# Patient Record
Sex: FEMALE | Race: OTHER | NOT HISPANIC OR LATINO | URBAN - METROPOLITAN AREA
[De-identification: names, ages, dates, MRNs, and addresses within clinical notes are randomized per-mention and may not be internally consistent; named-entity substitution may affect disease eponyms.]

---

## 2018-12-05 ENCOUNTER — INPATIENT (INPATIENT)
Facility: HOSPITAL | Age: 48
LOS: 1 days | Discharge: HOME | DRG: 313 | End: 2018-12-07
Attending: INTERNAL MEDICINE | Admitting: INTERNAL MEDICINE
Payer: COMMERCIAL

## 2018-12-05 VITALS
OXYGEN SATURATION: 98 % | DIASTOLIC BLOOD PRESSURE: 95 MMHG | HEART RATE: 100 BPM | RESPIRATION RATE: 18 BRPM | SYSTOLIC BLOOD PRESSURE: 174 MMHG | TEMPERATURE: 98 F

## 2018-12-05 DIAGNOSIS — Z29.9 ENCOUNTER FOR PROPHYLACTIC MEASURES, UNSPECIFIED: ICD-10-CM

## 2018-12-05 DIAGNOSIS — K35.80 UNSPECIFIED ACUTE APPENDICITIS: Chronic | ICD-10-CM

## 2018-12-05 DIAGNOSIS — E11.9 TYPE 2 DIABETES MELLITUS WITHOUT COMPLICATIONS: ICD-10-CM

## 2018-12-05 DIAGNOSIS — E78.5 HYPERLIPIDEMIA, UNSPECIFIED: ICD-10-CM

## 2018-12-05 DIAGNOSIS — M75.00 ADHESIVE CAPSULITIS OF UNSPECIFIED SHOULDER: ICD-10-CM

## 2018-12-05 DIAGNOSIS — R07.9 CHEST PAIN, UNSPECIFIED: ICD-10-CM

## 2018-12-05 DIAGNOSIS — I10 ESSENTIAL (PRIMARY) HYPERTENSION: ICD-10-CM

## 2018-12-05 LAB
ALBUMIN SERPL ELPH-MCNC: 3.6 G/DL — SIGNIFICANT CHANGE UP (ref 3.5–5)
ALP SERPL-CCNC: 126 U/L — HIGH (ref 40–120)
ALT FLD-CCNC: 63 U/L DA — HIGH (ref 10–60)
ANION GAP SERPL CALC-SCNC: 6 MMOL/L — SIGNIFICANT CHANGE UP (ref 5–17)
APTT BLD: 33.4 SEC — SIGNIFICANT CHANGE UP (ref 27.5–36.3)
AST SERPL-CCNC: 16 U/L — SIGNIFICANT CHANGE UP (ref 10–40)
BASOPHILS # BLD AUTO: 0.1 K/UL — SIGNIFICANT CHANGE UP (ref 0–0.2)
BASOPHILS NFR BLD AUTO: 1.4 % — SIGNIFICANT CHANGE UP (ref 0–2)
BILIRUB SERPL-MCNC: 0.3 MG/DL — SIGNIFICANT CHANGE UP (ref 0.2–1.2)
BUN SERPL-MCNC: 13 MG/DL — SIGNIFICANT CHANGE UP (ref 7–18)
CALCIUM SERPL-MCNC: 9.5 MG/DL — SIGNIFICANT CHANGE UP (ref 8.4–10.5)
CHLORIDE SERPL-SCNC: 104 MMOL/L — SIGNIFICANT CHANGE UP (ref 96–108)
CO2 SERPL-SCNC: 31 MMOL/L — SIGNIFICANT CHANGE UP (ref 22–31)
CREAT SERPL-MCNC: 0.62 MG/DL — SIGNIFICANT CHANGE UP (ref 0.5–1.3)
D DIMER BLD IA.RAPID-MCNC: 217 NG/ML DDU — SIGNIFICANT CHANGE UP
EOSINOPHIL # BLD AUTO: 0.1 K/UL — SIGNIFICANT CHANGE UP (ref 0–0.5)
EOSINOPHIL NFR BLD AUTO: 2.4 % — SIGNIFICANT CHANGE UP (ref 0–6)
GLUCOSE SERPL-MCNC: 158 MG/DL — HIGH (ref 70–99)
HCG SERPL-ACNC: 2 MIU/ML — SIGNIFICANT CHANGE UP
HCT VFR BLD CALC: 42.6 % — SIGNIFICANT CHANGE UP (ref 34.5–45)
HGB BLD-MCNC: 14.1 G/DL — SIGNIFICANT CHANGE UP (ref 11.5–15.5)
INR BLD: 0.98 RATIO — SIGNIFICANT CHANGE UP (ref 0.88–1.16)
LYMPHOCYTES # BLD AUTO: 2.5 K/UL — SIGNIFICANT CHANGE UP (ref 1–3.3)
LYMPHOCYTES # BLD AUTO: 39.8 % — SIGNIFICANT CHANGE UP (ref 13–44)
MAGNESIUM SERPL-MCNC: 2.1 MG/DL — SIGNIFICANT CHANGE UP (ref 1.6–2.6)
MCHC RBC-ENTMCNC: 28.4 PG — SIGNIFICANT CHANGE UP (ref 27–34)
MCHC RBC-ENTMCNC: 33 GM/DL — SIGNIFICANT CHANGE UP (ref 32–36)
MCV RBC AUTO: 86.1 FL — SIGNIFICANT CHANGE UP (ref 80–100)
MONOCYTES # BLD AUTO: 0.4 K/UL — SIGNIFICANT CHANGE UP (ref 0–0.9)
MONOCYTES NFR BLD AUTO: 6.4 % — SIGNIFICANT CHANGE UP (ref 2–14)
NEUTROPHILS # BLD AUTO: 3.1 K/UL — SIGNIFICANT CHANGE UP (ref 1.8–7.4)
NEUTROPHILS NFR BLD AUTO: 50 % — SIGNIFICANT CHANGE UP (ref 43–77)
NT-PROBNP SERPL-SCNC: 23 PG/ML — SIGNIFICANT CHANGE UP (ref 0–125)
PHOSPHATE SERPL-MCNC: 3.6 MG/DL — SIGNIFICANT CHANGE UP (ref 2.5–4.5)
PLATELET # BLD AUTO: 324 K/UL — SIGNIFICANT CHANGE UP (ref 150–400)
POTASSIUM SERPL-MCNC: 4.5 MMOL/L — SIGNIFICANT CHANGE UP (ref 3.5–5.3)
POTASSIUM SERPL-SCNC: 4.5 MMOL/L — SIGNIFICANT CHANGE UP (ref 3.5–5.3)
PROT SERPL-MCNC: 8.2 G/DL — SIGNIFICANT CHANGE UP (ref 6–8.3)
PROTHROM AB SERPL-ACNC: 10.9 SEC — SIGNIFICANT CHANGE UP (ref 10–12.9)
RBC # BLD: 4.95 M/UL — SIGNIFICANT CHANGE UP (ref 3.8–5.2)
RBC # FLD: 13.1 % — SIGNIFICANT CHANGE UP (ref 10.3–14.5)
SODIUM SERPL-SCNC: 141 MMOL/L — SIGNIFICANT CHANGE UP (ref 135–145)
TROPONIN I SERPL-MCNC: <0.015 NG/ML — SIGNIFICANT CHANGE UP (ref 0–0.04)
WBC # BLD: 6.2 K/UL — SIGNIFICANT CHANGE UP (ref 3.8–10.5)
WBC # FLD AUTO: 6.2 K/UL — SIGNIFICANT CHANGE UP (ref 3.8–10.5)

## 2018-12-05 PROCEDURE — 93010 ELECTROCARDIOGRAM REPORT: CPT

## 2018-12-05 PROCEDURE — 99285 EMERGENCY DEPT VISIT HI MDM: CPT

## 2018-12-05 PROCEDURE — 71046 X-RAY EXAM CHEST 2 VIEWS: CPT | Mod: 26

## 2018-12-05 RX ORDER — METOPROLOL TARTRATE 50 MG
25 TABLET ORAL DAILY
Qty: 0 | Refills: 0 | Status: DISCONTINUED | OUTPATIENT
Start: 2018-12-05 | End: 2018-12-05

## 2018-12-05 RX ORDER — DEXTROSE 50 % IN WATER 50 %
12.5 SYRINGE (ML) INTRAVENOUS ONCE
Qty: 0 | Refills: 0 | Status: DISCONTINUED | OUTPATIENT
Start: 2018-12-05 | End: 2018-12-07

## 2018-12-05 RX ORDER — INSULIN DEGLUDEC 100 U/ML
55 INJECTION, SOLUTION SUBCUTANEOUS
Qty: 0 | Refills: 0 | COMMUNITY

## 2018-12-05 RX ORDER — ASPIRIN/CALCIUM CARB/MAGNESIUM 324 MG
81 TABLET ORAL DAILY
Qty: 0 | Refills: 0 | Status: DISCONTINUED | OUTPATIENT
Start: 2018-12-05 | End: 2018-12-07

## 2018-12-05 RX ORDER — DEXTROSE 50 % IN WATER 50 %
25 SYRINGE (ML) INTRAVENOUS ONCE
Qty: 0 | Refills: 0 | Status: DISCONTINUED | OUTPATIENT
Start: 2018-12-05 | End: 2018-12-07

## 2018-12-05 RX ORDER — INSULIN GLARGINE 100 [IU]/ML
20 INJECTION, SOLUTION SUBCUTANEOUS AT BEDTIME
Qty: 0 | Refills: 0 | Status: DISCONTINUED | OUTPATIENT
Start: 2018-12-05 | End: 2018-12-07

## 2018-12-05 RX ORDER — GLUCAGON INJECTION, SOLUTION 0.5 MG/.1ML
1 INJECTION, SOLUTION SUBCUTANEOUS ONCE
Qty: 0 | Refills: 0 | Status: DISCONTINUED | OUTPATIENT
Start: 2018-12-05 | End: 2018-12-07

## 2018-12-05 RX ORDER — ACETAMINOPHEN 500 MG
975 TABLET ORAL ONCE
Qty: 0 | Refills: 0 | Status: COMPLETED | OUTPATIENT
Start: 2018-12-05 | End: 2018-12-05

## 2018-12-05 RX ORDER — SODIUM CHLORIDE 9 MG/ML
1000 INJECTION, SOLUTION INTRAVENOUS
Qty: 0 | Refills: 0 | Status: DISCONTINUED | OUTPATIENT
Start: 2018-12-05 | End: 2018-12-07

## 2018-12-05 RX ORDER — INSULIN LISPRO 100/ML
VIAL (ML) SUBCUTANEOUS
Qty: 0 | Refills: 0 | Status: DISCONTINUED | OUTPATIENT
Start: 2018-12-05 | End: 2018-12-07

## 2018-12-05 RX ORDER — LISINOPRIL 2.5 MG/1
10 TABLET ORAL DAILY
Qty: 0 | Refills: 0 | Status: DISCONTINUED | OUTPATIENT
Start: 2018-12-05 | End: 2018-12-07

## 2018-12-05 RX ORDER — DEXTROSE 50 % IN WATER 50 %
15 SYRINGE (ML) INTRAVENOUS ONCE
Qty: 0 | Refills: 0 | Status: DISCONTINUED | OUTPATIENT
Start: 2018-12-05 | End: 2018-12-07

## 2018-12-05 RX ORDER — METOPROLOL TARTRATE 50 MG
25 TABLET ORAL DAILY
Qty: 0 | Refills: 0 | Status: DISCONTINUED | OUTPATIENT
Start: 2018-12-05 | End: 2018-12-07

## 2018-12-05 RX ORDER — ATORVASTATIN CALCIUM 80 MG/1
40 TABLET, FILM COATED ORAL AT BEDTIME
Qty: 0 | Refills: 0 | Status: DISCONTINUED | OUTPATIENT
Start: 2018-12-05 | End: 2018-12-07

## 2018-12-05 RX ADMIN — Medication 975 MILLIGRAM(S): at 23:14

## 2018-12-05 RX ADMIN — Medication 975 MILLIGRAM(S): at 17:25

## 2018-12-05 RX ADMIN — ATORVASTATIN CALCIUM 40 MILLIGRAM(S): 80 TABLET, FILM COATED ORAL at 23:24

## 2018-12-05 NOTE — ED PROVIDER NOTE - NS ED ROS FT
Constitutional: - Fever, - Chills, - unexplained weight loss  Eyes: - Discharge, - Irritation, - Redness, - Visual changes, - Light sensitivity  EARS: - Ear Pain, - hearing loss  NOSE: - Congestion, - epistaxis  MOUTH/THROAT: - Vocal Changes, - Drooling, - Sore throat  NECK: - Lumps, - Stiffness, - Pain  CV: - Palpitations, + Chest Pain, - Edema   RESP:  - Cough, + Shortness of Breath, - Dyspnea on Exertion, - Wheezing  GI: - Diarrhea, - Constipation, - Bloody stools, - Nausea, - Vomiting, - Abdominal Pain  : - Dysuria, -Frequency, - Hematuria  MSK: - Myalgias, - focal weakness, - Injuries  SKIN: - Color change, - Rash  NEURO: - Change in behavior, - Dec. Alertness, - Headache, - Change in speech, - Seizure-like activity

## 2018-12-05 NOTE — ED PROVIDER NOTE - PROGRESS NOTE DETAILS
pt continues to feel well in ED, no emergent findings on initial workup, admitted to dr. gallego for ACS r/o given risk factors and concerning story

## 2018-12-05 NOTE — H&P ADULT - NEGATIVE OPHTHALMOLOGIC SYMPTOMS
no diplopia/no lacrimation L/no lacrimation R/no blurred vision L/no blurred vision R/no photophobia

## 2018-12-05 NOTE — ED PROVIDER NOTE - CARE PLAN
Principal Discharge DX:	Chest pain  Secondary Diagnosis:	Diabetes  Secondary Diagnosis:	Hyperlipidemia  Secondary Diagnosis:	Hypertension

## 2018-12-05 NOTE — H&P ADULT - PROBLEM SELECTOR PLAN 6
IMPROVE VTE Individual Risk Assessment   RISK                                                          Points  [  ] Previous VTE                                                3  [  ] Thrombophilia                                             2  [  ] Lower limb paralysis                                    2        (unable to hold up >15 seconds)    [  ] Current Cancer                                             2         (within 6 months)  [x  ] Immobilization > 24 hrs                              1  [  ] ICU/CCU stay > 24 hours                            1  [  ] Age > 60                                                    1  IMPROVE VTE Score _________   No need of DVT prophylaxis

## 2018-12-05 NOTE — H&P ADULT - PROBLEM SELECTOR PLAN 1
pt presented with left sided chest pain.  EKG was normal  Troponin T1 normal.  F/u T2 and T3  Aspirin, statin and betablocker started   F/u Echocardiography and stress test at Am.  Cardiology:  pt presented with left sided chest pain.  EKG showed normal sinus rhythm  Troponin T1 normal.  F/u T2 and T3  Aspirin, statin and betablocker started   F/u Echocardiography and stress test at Am.  Cardiology:  pt presented with left sided chest pain.  JOSHUA score:2, , Heart score:2  EKG showed normal sinus rhythm  Troponin T1 normal.  F/u T2 and T3  Aspirin, statin and betablocker started   F/u Echocardiography and stress test at Am.  Cardiology:

## 2018-12-05 NOTE — ED PROVIDER NOTE - OBJECTIVE STATEMENT
48F h/o htn hld dm p/w few episodes CP over past few days. Pt says ~4d ago she was running to bus and had sudden onset of severe sharp left chest pain a/w SOB which she had never had before. Recently went to PCP and had bloodwork that showed HbA1C of 13, cholesterol 350s, triglycerides 190. Also started meloxicam for left shoulder pain and after meloxicam felt short of breath and had facial swelling which has since resolved. Last stress years ago. Pt denies known cardiac hx, social hx, EVELYN/pain, recent travel/trauma/surg, dvt/pe/hypercoag hx in self or family, cancer hx, exogenous estrogen tx, trauma, dizziness, syncope, asthma/smoking hx

## 2018-12-05 NOTE — H&P ADULT - PROBLEM SELECTOR PLAN 5
IMPROVE VTE Individual Risk Assessment   RISK                                                          Points  [  ] Previous VTE                                                3  [  ] Thrombophilia                                             2  [  ] Lower limb paralysis                                    2        (unable to hold up >15 seconds)    [  ] Current Cancer                                             2         (within 6 months)  [x  ] Immobilization > 24 hrs                              1  [  ] ICU/CCU stay > 24 hours                            1  [  ] Age > 60                                                    1  IMPROVE VTE Score _________   No need of DVT prophylaxis Pt has left shoulder pain with decreased range of motion noted on physical examination.   Pain medication as needed.  Primary team please call orthopedics at am Pt has left shoulder pain with decreased range of motion noted on physical examination. As the pt has diabetes, frozen shoulder is most likely differential diagnosis.  Pain medication as needed.  Primary team please call orthopedics at am

## 2018-12-05 NOTE — H&P ADULT - NSHPSOCIALHISTORY_GEN_ALL_CORE
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 She has never smoked. Drinks alcohol obsessionally.

## 2018-12-05 NOTE — ED ADULT NURSE NOTE - OBJECTIVE STATEMENT
pt a&ox3, here with c/o CP. pt states she started meloxicam last week and has been having CP since last thursday. states left sided CP and left shoulder pain. pt attributes shoulder pain to carrying heavy bag. denies n/v, fever, cough, sweats, jaw pain, back pain.

## 2018-12-05 NOTE — H&P ADULT - ASSESSMENT
49 y/o female with PMH of HTN, DM, HLD presented with chest pain and back pain since 1 week. According to  the pt, she was having mild chest pain  last week of about 6/10 intensity initially but increased up to 9/10 when she ran for bus, relieved by rest. Mild chest pain persisted since then and she called her PCP yesterday who recommended to come to ED. S    12-05    141  |  104  |  13  ----------------------------<  158<H>  4.5   |  31  |  0.62    Ca    9.5      05 Dec 2018 17:24  Phos  3.6     12-05  Mg     2.1     12-05    TPro  8.2  /  Alb  3.6  /  TBili  0.3  /  DBili  x   /  AST  16  /  ALT  63<H>  /  AlkPhos  126<H>  12-05                          14.1   6.2   )-----------( 324      ( 05 Dec 2018 17:24 )             42.6     ED course:  Vitals: B.P:  147/95, H.R:100, T: 97.5F, RR: 18, SPO2: 98, EKG normal sinus rhythm, Chest X-ray was normal,   Troponoin T1: Normal

## 2018-12-05 NOTE — ED ADULT NURSE NOTE - ED STAT RN HANDOFF DETAILS
report given to REJI Mitchell in G1 in stable condition for continuation of care. pt a&ox3, admitted for CP and HTN.

## 2018-12-05 NOTE — H&P ADULT - PROBLEM SELECTOR PLAN 4
Pt has h/o HTN and takes Lisinopril 10mg OD  Continue home medicaiton  Monitor Blood pressure Pt has h/o HTN and takes Lisinopril 10mg OD  Continue home medication  Monitor Blood pressure

## 2018-12-05 NOTE — H&P ADULT - HISTORY OF PRESENT ILLNESS
47 y/o female with PMH of HTN, DM, HLD presented with chest pain and back pain since 1 week. According to  the pt, she was having mild chest pain  last week of about 6/10 intensity initially but increased up to 9/10 when she ran for bus, relieved by rest. Mild chest pain persisted since then and she called her PCP yesterday who recommended to come to ED. She also complained of sharp and shooting back pain since last weekradiating from lumbar vertebra to cervical spine lasting few seconds. Back pain is on and off since then. She mentioned that her friends and family noticed her face swollen. She also endorsed that she has been having  left shoulder pain since 2 months as she has been carrying heavy bags on her left hand. 47 y/o female with PMH of HTN, DM, HLD presented with chest pain and back pain since 1 week. According to  the pt, she was having mild chest pain  last week of about 6/10 intensity initially but increased up to 9/10 when she ran for bus, relieved by rest. Mild chest pain persisted since then and she called her PCP yesterday who recommended to come to ED. She also complained of sharp and shooting back pain since last weekradiating from lumbar vertebra to cervical spine lasting few seconds. Back pain is on and off since then. She mentioned that her friends and family noticed her face swollen. She also endorsed that she has been having  left shoulder pain since 2 months as she has been carrying heavy bags on her left hand. She mentioned that she has mild shortness of breath while walking and mild cough. orthopnea, PND and leg swelling are absent.  She deniees nausea, vomiting, blurring of vision, dizziness.    PMH: Diabetes, HTN, JLD  Past Surgical HX: Appendicectomy for appendicitis.  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

## 2018-12-05 NOTE — H&P ADULT - PROBLEM SELECTOR PLAN 2
Pt has a history of diabetes.  Continue sliding scale insulin Pt has a history of diabetes.  She takes Tresiba flex touch 55 units daily along with oral medications Jentadueto and Jardiance.   Continue sliding scale insulin.  Lantus 25 units at bedtime started

## 2018-12-05 NOTE — ED PROVIDER NOTE - PHYSICAL EXAMINATION
PE:   GEN: Awake, alert, oriented, interactive, NAD, well appearing.   HEAD: Atraumatic, normocephalic  EYES: Sclera white, conjunctiva pink, PERRLA  CARDIAC: borderline tachycardia, regular rhythm, S1,S2, no murmur/rub/gallop. Strong central and peripheral pulses, Brisk cap refill, no evident pedal edema.   RESP: Normal respiratory rate and effort, no resp distress, lungs cta b/l without accessory muscle use, wheeze, rhonchi, or rales.   ABD: soft, non-tender, nondistended  NEURO: AOx3, CN II-XII grossly intact without focal deficits   MSK: Moving all extremities spontaneously, no apparent deformities  SKIN: warm, dry

## 2018-12-05 NOTE — ED PROVIDER NOTE - MEDICAL DECISION MAKING DETAILS
markedly elevated hba1c and lipids, story concerning for ACS, no current active CP. less concerning for PE, but given  r/o with dimer. no ASA given recently possible anaphylaxis plan: ekg monitor cbc cmp trop dimer bnp coags cxr reassess admit for ACS r/o given high heart score.

## 2018-12-05 NOTE — H&P ADULT - NEUROLOGICAL DETAILS
sensation intact/superficial reflexes intact/alert and oriented x 3/deep reflexes intact/cranial nerves intact/normal strength

## 2018-12-06 DIAGNOSIS — R06.02 SHORTNESS OF BREATH: ICD-10-CM

## 2018-12-06 LAB
24R-OH-CALCIDIOL SERPL-MCNC: 15.2 NG/ML — LOW (ref 30–80)
ALBUMIN SERPL ELPH-MCNC: 3.2 G/DL — LOW (ref 3.5–5)
ALP SERPL-CCNC: 97 U/L — SIGNIFICANT CHANGE UP (ref 40–120)
ALT FLD-CCNC: 50 U/L DA — SIGNIFICANT CHANGE UP (ref 10–60)
ANION GAP SERPL CALC-SCNC: 7 MMOL/L — SIGNIFICANT CHANGE UP (ref 5–17)
AST SERPL-CCNC: 17 U/L — SIGNIFICANT CHANGE UP (ref 10–40)
BASOPHILS # BLD AUTO: 0.1 K/UL — SIGNIFICANT CHANGE UP (ref 0–0.2)
BASOPHILS NFR BLD AUTO: 0.8 % — SIGNIFICANT CHANGE UP (ref 0–2)
BILIRUB SERPL-MCNC: 0.4 MG/DL — SIGNIFICANT CHANGE UP (ref 0.2–1.2)
BUN SERPL-MCNC: 13 MG/DL — SIGNIFICANT CHANGE UP (ref 7–18)
CALCIUM SERPL-MCNC: 8.7 MG/DL — SIGNIFICANT CHANGE UP (ref 8.4–10.5)
CHLORIDE SERPL-SCNC: 105 MMOL/L — SIGNIFICANT CHANGE UP (ref 96–108)
CHOLEST SERPL-MCNC: 212 MG/DL — HIGH (ref 10–199)
CK MB BLD-MCNC: <1.2 % — SIGNIFICANT CHANGE UP (ref 0–3.5)
CK MB BLD-MCNC: <1.3 % — SIGNIFICANT CHANGE UP (ref 0–3.5)
CK MB CFR SERPL CALC: <1 NG/ML — SIGNIFICANT CHANGE UP (ref 0–3.6)
CK MB CFR SERPL CALC: <1 NG/ML — SIGNIFICANT CHANGE UP (ref 0–3.6)
CK SERPL-CCNC: 76 U/L — SIGNIFICANT CHANGE UP (ref 21–215)
CK SERPL-CCNC: 81 U/L — SIGNIFICANT CHANGE UP (ref 21–215)
CO2 SERPL-SCNC: 29 MMOL/L — SIGNIFICANT CHANGE UP (ref 22–31)
CREAT SERPL-MCNC: 0.44 MG/DL — LOW (ref 0.5–1.3)
EOSINOPHIL # BLD AUTO: 0.2 K/UL — SIGNIFICANT CHANGE UP (ref 0–0.5)
EOSINOPHIL NFR BLD AUTO: 2.2 % — SIGNIFICANT CHANGE UP (ref 0–6)
FOLATE SERPL-MCNC: >20 NG/ML — SIGNIFICANT CHANGE UP
GLUCOSE BLDC GLUCOMTR-MCNC: 122 MG/DL — HIGH (ref 70–99)
GLUCOSE BLDC GLUCOMTR-MCNC: 182 MG/DL — HIGH (ref 70–99)
GLUCOSE BLDC GLUCOMTR-MCNC: 204 MG/DL — HIGH (ref 70–99)
GLUCOSE SERPL-MCNC: 89 MG/DL — SIGNIFICANT CHANGE UP (ref 70–99)
HBA1C BLD-MCNC: 10.9 % — HIGH (ref 4–5.6)
HCT VFR BLD CALC: 40.7 % — SIGNIFICANT CHANGE UP (ref 34.5–45)
HDLC SERPL-MCNC: 49 MG/DL — LOW
HGB BLD-MCNC: 12.8 G/DL — SIGNIFICANT CHANGE UP (ref 11.5–15.5)
INR BLD: 1.06 RATIO — SIGNIFICANT CHANGE UP (ref 0.88–1.16)
LIPID PNL WITH DIRECT LDL SERPL: 138 MG/DL — SIGNIFICANT CHANGE UP
LYMPHOCYTES # BLD AUTO: 2.3 K/UL — SIGNIFICANT CHANGE UP (ref 1–3.3)
LYMPHOCYTES # BLD AUTO: 33.4 % — SIGNIFICANT CHANGE UP (ref 13–44)
MAGNESIUM SERPL-MCNC: 2 MG/DL — SIGNIFICANT CHANGE UP (ref 1.6–2.6)
MCHC RBC-ENTMCNC: 28.3 PG — SIGNIFICANT CHANGE UP (ref 27–34)
MCHC RBC-ENTMCNC: 31.4 GM/DL — LOW (ref 32–36)
MCV RBC AUTO: 90.2 FL — SIGNIFICANT CHANGE UP (ref 80–100)
MONOCYTES # BLD AUTO: 0.5 K/UL — SIGNIFICANT CHANGE UP (ref 0–0.9)
MONOCYTES NFR BLD AUTO: 6.5 % — SIGNIFICANT CHANGE UP (ref 2–14)
NEUTROPHILS # BLD AUTO: 4 K/UL — SIGNIFICANT CHANGE UP (ref 1.8–7.4)
NEUTROPHILS NFR BLD AUTO: 57 % — SIGNIFICANT CHANGE UP (ref 43–77)
PHOSPHATE SERPL-MCNC: 4 MG/DL — SIGNIFICANT CHANGE UP (ref 2.5–4.5)
PLATELET # BLD AUTO: 294 K/UL — SIGNIFICANT CHANGE UP (ref 150–400)
POTASSIUM SERPL-MCNC: 3.7 MMOL/L — SIGNIFICANT CHANGE UP (ref 3.5–5.3)
POTASSIUM SERPL-SCNC: 3.7 MMOL/L — SIGNIFICANT CHANGE UP (ref 3.5–5.3)
PROT SERPL-MCNC: 7 G/DL — SIGNIFICANT CHANGE UP (ref 6–8.3)
PROTHROM AB SERPL-ACNC: 11.8 SEC — SIGNIFICANT CHANGE UP (ref 10–12.9)
RBC # BLD: 4.52 M/UL — SIGNIFICANT CHANGE UP (ref 3.8–5.2)
RBC # FLD: 13.3 % — SIGNIFICANT CHANGE UP (ref 10.3–14.5)
SODIUM SERPL-SCNC: 141 MMOL/L — SIGNIFICANT CHANGE UP (ref 135–145)
TOTAL CHOLESTEROL/HDL RATIO MEASUREMENT: 4.3 RATIO — SIGNIFICANT CHANGE UP (ref 3.3–7.1)
TRIGL SERPL-MCNC: 127 MG/DL — SIGNIFICANT CHANGE UP (ref 10–149)
TROPONIN I SERPL-MCNC: <0.015 NG/ML — SIGNIFICANT CHANGE UP (ref 0–0.04)
TROPONIN I SERPL-MCNC: <0.015 NG/ML — SIGNIFICANT CHANGE UP (ref 0–0.04)
TSH SERPL-MCNC: 1.5 UU/ML — SIGNIFICANT CHANGE UP (ref 0.34–4.82)
TSH SERPL-MCNC: 1.58 UU/ML — SIGNIFICANT CHANGE UP (ref 0.34–4.82)
VIT B12 SERPL-MCNC: 520 PG/ML — SIGNIFICANT CHANGE UP (ref 232–1245)
WBC # BLD: 6.9 K/UL — SIGNIFICANT CHANGE UP (ref 3.8–10.5)
WBC # FLD AUTO: 6.9 K/UL — SIGNIFICANT CHANGE UP (ref 3.8–10.5)

## 2018-12-06 RX ORDER — ERGOCALCIFEROL 1.25 MG/1
50000 CAPSULE ORAL
Qty: 0 | Refills: 0 | Status: DISCONTINUED | OUTPATIENT
Start: 2018-12-06 | End: 2018-12-07

## 2018-12-06 RX ORDER — ENOXAPARIN SODIUM 100 MG/ML
40 INJECTION SUBCUTANEOUS DAILY
Qty: 0 | Refills: 0 | Status: DISCONTINUED | OUTPATIENT
Start: 2018-12-06 | End: 2018-12-07

## 2018-12-06 RX ADMIN — INSULIN GLARGINE 20 UNIT(S): 100 INJECTION, SOLUTION SUBCUTANEOUS at 21:54

## 2018-12-06 RX ADMIN — Medication 1: at 12:25

## 2018-12-06 RX ADMIN — Medication 25 MILLIGRAM(S): at 05:44

## 2018-12-06 RX ADMIN — ATORVASTATIN CALCIUM 40 MILLIGRAM(S): 80 TABLET, FILM COATED ORAL at 21:52

## 2018-12-06 RX ADMIN — Medication 81 MILLIGRAM(S): at 12:28

## 2018-12-06 RX ADMIN — LISINOPRIL 10 MILLIGRAM(S): 2.5 TABLET ORAL at 05:44

## 2018-12-06 NOTE — PROGRESS NOTE ADULT - SUBJECTIVE AND OBJECTIVE BOX
PGY1 Note discussed with supervising resident and primary attending.    Patient is a 48y old  Female who presents with a chief complaint of Chest pain (06 Dec 2018 10:26)      INTERVAL HPI/OVERNIGHT EVENTS:    Ms. Delgado is seen at the bedside. No new complaints.    MEDICATIONS  (STANDING):  aspirin  chewable 81 milliGRAM(s) Oral daily  atorvastatin 40 milliGRAM(s) Oral at bedtime  dextrose 5%. 1000 milliLiter(s) (50 mL/Hr) IV Continuous <Continuous>  dextrose 50% Injectable 12.5 Gram(s) IV Push once  dextrose 50% Injectable 25 Gram(s) IV Push once  dextrose 50% Injectable 25 Gram(s) IV Push once  insulin glargine Injectable (LANTUS) 20 Unit(s) SubCutaneous at bedtime  insulin lispro (HumaLOG) corrective regimen sliding scale   SubCutaneous three times a day before meals  lisinopril 10 milliGRAM(s) Oral daily  metoprolol succinate ER 25 milliGRAM(s) Oral daily    MEDICATIONS  (PRN):  dextrose 40% Gel 15 Gram(s) Oral once PRN Blood Glucose LESS THAN 70 milliGRAM(s)/deciliter  glucagon  Injectable 1 milliGRAM(s) IntraMuscular once PRN Glucose LESS THAN 70 milligrams/deciliter      Allergies    NSAIDs (Short breath)  peppers (Rash)    Intolerances        REVIEW OF SYSTEMS:  CONSTITUTIONAL: No fever, weight loss, or fatigue  RESPIRATORY: No cough, wheezing, chills or hemoptysis; No shortness of breath  CARDIOVASCULAR: No chest pain, palpitations, dizziness, or leg swelling  GASTROINTESTINAL: No abdominal or epigastric pain. No nausea, vomiting, or hematemesis; No diarrhea or constipation. No melena or hematochezia.  NEUROLOGICAL: No headaches, memory loss, loss of strength, numbness, or tremors  SKIN: No itching, burning, rashes, or lesions     Vital Signs Last 24 Hrs  T(C): 36.7 (06 Dec 2018 11:07), Max: 36.9 (06 Dec 2018 02:47)  T(F): 98 (06 Dec 2018 11:07), Max: 98.5 (06 Dec 2018 05:09)  HR: 86 (06 Dec 2018 11:07) (77 - 100)  BP: 135/55 (06 Dec 2018 11:07) (133/48 - 174/95)  BP(mean): --  RR: 18 (06 Dec 2018 11:07) (16 - 18)  SpO2: 99% (06 Dec 2018 11:07) (97% - 99%)    PHYSICAL EXAM:  GENERAL: NAD, well-groomed, well-developed  HEAD:  Atraumatic, Normocephalic  EYES: EOMI, PERRLA, conjunctiva and sclera clear  NECK: Supple, No JVD, Normal thyroid  CHEST/LUNG: Clear to percussion bilaterally; No rales, rhonchi, wheezing, or rubs  HEART: Regular rate and rhythm; No murmurs, rubs, or gallops  ABDOMEN: Soft, Nontender, Nondistended; Bowel sounds present  NERVOUS SYSTEM:  Alert & Oriented X3, Good concentration; Motor Strength 5/5 B/L   EXTREMITIES:  2+ Peripheral Pulses, No clubbing, cyanosis, or edema    LABS:                        12.8   6.9   )-----------( 294      ( 06 Dec 2018 06:20 )             40.7     12-06    141  |  105  |  13  ----------------------------<  89  3.7   |  29  |  0.44<L>    Ca    8.7      06 Dec 2018 06:20  Phos  4.0     12-06  Mg     2.0     12-06    TPro  7.0  /  Alb  3.2<L>  /  TBili  0.4  /  DBili  x   /  AST  17  /  ALT  50  /  AlkPhos  97  12-06    PT/INR - ( 06 Dec 2018 06:20 )   PT: 11.8 sec;   INR: 1.06 ratio         PTT - ( 05 Dec 2018 17:24 )  PTT:33.4 sec    CAPILLARY BLOOD GLUCOSE      POCT Blood Glucose.: 182 mg/dL (06 Dec 2018 11:59)      RADIOLOGY & ADDITIONAL TESTS:    Imaging Personally Reviewed: [ X ] YES  [ ] NO    Consultant(s) Notes Reviewed:  [X ] YES  [ ] NO

## 2018-12-06 NOTE — PROGRESS NOTE ADULT - ASSESSMENT
49 y/o female with PMH of HTN, DM, HLD presented with chest pain and back pain since 1 week. According to  the pt, she was having mild chest pain  last week of about 6/10 intensity initially but increased up to 9/10 when she ran for bus, relieved by rest. Mild chest pain persisted since then and she called her PCP yesterday who recommended to come to ED. She also complained of sharp and shooting back pain since last weekradiating from lumbar vertebra to cervical spine lasting few seconds. Back pain is on and off since then. She mentioned that her friends and family noticed her face swollen. She also endorsed that she has been having  left shoulder pain since 2 months as she has been carrying heavy bags on her left hand. She mentioned that she has mild shortness of breath while walking and mild cough. orthopnea, PND and leg swelling are absent.  She deniees nausea, vomiting, blurring of vision, dizziness.      NST negative. Echo pending. Will d/c if echo normal.

## 2018-12-06 NOTE — CONSULT NOTE ADULT - SUBJECTIVE AND OBJECTIVE BOX
CHIEF COMPLAINT:Patient is a 48y old  Female who presents with a chief complaint of Chest pain.      HPI:  49 y/o female with PMH of HTN, DM, HLD presented with chest pain and back pain since 1 week. According to  the pt, she was having mild chest pain  last week of about 6/10 intensity initially but increased up to 9/10 when she ran for bus, relieved by rest. Mild chest pain persisted since then and she called her PCP yesterday who recommended to come to ED. She also complained of sharp and shooting back pain since last weekradiating from lumbar vertebra to cervical spine lasting few seconds. Back pain is on and off since then. She mentioned that her friends and family noticed her face swollen. She also endorsed that she has been having  left shoulder pain since 2 months as she has been carrying heavy bags on her left hand. She mentioned that she has mild shortness of breath while walking and mild cough. orthopnea, PND and leg swelling are absent.  She deniees nausea, vomiting, blurring of vision, dizziness.      PAST MEDICAL & SURGICAL HISTORY:  Diabetes  Hyperlipidemia  Hypertension  Acute appendicitis      MEDICATIONS  (STANDING):  aspirin  chewable 81 milliGRAM(s) Oral daily  atorvastatin 40 milliGRAM(s) Oral at bedtime  dextrose 5%. 1000 milliLiter(s) (50 mL/Hr) IV Continuous <Continuous>  dextrose 50% Injectable 12.5 Gram(s) IV Push once  dextrose 50% Injectable 25 Gram(s) IV Push once  dextrose 50% Injectable 25 Gram(s) IV Push once  insulin glargine Injectable (LANTUS) 20 Unit(s) SubCutaneous at bedtime  insulin lispro (HumaLOG) corrective regimen sliding scale   SubCutaneous three times a day before meals  lisinopril 10 milliGRAM(s) Oral daily  metoprolol succinate ER 25 milliGRAM(s) Oral daily    MEDICATIONS  (PRN):  dextrose 40% Gel 15 Gram(s) Oral once PRN Blood Glucose LESS THAN 70 milliGRAM(s)/deciliter  glucagon  Injectable 1 milliGRAM(s) IntraMuscular once PRN Glucose LESS THAN 70 milligrams/deciliter      FAMILY HISTORY:  Family history of hypertension (Mother)  Family history of diabetes mellitus (Mother)      SOCIAL HISTORY:    [x ] Non-smoker    [x ] Alcohol-denies    Allergies    NSAIDs (Short breath)  peppers (Rash)    Intolerances    	    REVIEW OF SYSTEMS:  CONSTITUTIONAL: No fever, weight loss, or fatigue  EYES: No eye pain, visual disturbances, or discharge  ENT:  No difficulty hearing, tinnitus, vertigo; No sinus or throat pain  NECK: No pain or stiffness  RESPIRATORY: No cough, wheezing, chills or hemoptysis; + Shortness of Breath  CARDIOVASCULAR: + chest pain, No palpitations, passing out, dizziness, or leg swelling  GASTROINTESTINAL: No abdominal or epigastric pain. No nausea, vomiting, or hematemesis; No diarrhea or constipation. No melena or hematochezia.  GENITOURINARY: No dysuria, frequency, hematuria, or incontinence  NEUROLOGICAL: No headaches, memory loss, loss of strength, numbness, or tremors  SKIN: No itching, burning, rashes, or lesions   LYMPH Nodes: No enlarged glands  ENDOCRINE: No heat or cold intolerance; No hair loss  MUSCULOSKELETAL: No joint pain or swelling; No muscle, back, or extremity pain  PSYCHIATRIC: No depression, anxiety, mood swings, or difficulty sleeping  HEME/LYMPH: No easy bruising, or bleeding gums  ALLERGY AND IMMUNOLOGIC: No hives or eczema	      PHYSICAL EXAM:  T(C): 36.9 (12-06-18 @ 05:09), Max: 36.9 (12-06-18 @ 02:47)  HR: 80 (12-06-18 @ 05:09) (80 - 100)  BP: 138/60 (12-06-18 @ 05:09) (133/48 - 174/95)  RR: 16 (12-06-18 @ 05:09) (16 - 18)  SpO2: 97% (12-06-18 @ 05:09) (97% - 98%)      Appearance: Normal	  HEENT:   Normal oral mucosa, PERRL, EOMI	  Lymphatic: No lymphadenopathy  Cardiovascular: Normal S1 S2, No JVD, No murmurs, No edema  Respiratory: Lungs clear to auscultation	  Psychiatry: A & O x 3, Mood & affect appropriate  Gastrointestinal:  Soft, Non-tender, + BS	  Skin: No rashes, No ecchymoses, No cyanosis	  Neurologic: Non-focal  Extremities: Normal range of motion, No clubbing, cyanosis or edema  Vascular: Peripheral pulses palpable 2+ bilaterally        ECG:  	nsr,nl axis    	  LABS:	 	    CARDIAC MARKERS:  CARDIAC MARKERS ( 06 Dec 2018 06:20 )  <0.015 ng/mL / x     / 76 U/L / x     / <1.0 ng/mL  CARDIAC MARKERS ( 05 Dec 2018 23:36 )  <0.015 ng/mL / x     / 81 U/L / x     / <1.0 ng/mL  CARDIAC MARKERS ( 05 Dec 2018 17:24 )  <0.015 ng/mL / x     / x     / x     / x                             12.8   6.9   )-----------( 294      ( 06 Dec 2018 06:20 )             40.7     12-06    141  |  105  |  13  ----------------------------<  89  3.7   |  29  |  0.44<L>    Ca    8.7      06 Dec 2018 06:20  Phos  4.0     12-06  Mg     2.0     12-06    TPro  7.0  /  Alb  3.2<L>  /  TBili  0.4  /  DBili  x   /  AST  17  /  ALT  50  /  AlkPhos  97  12-06    proBNP: Serum Pro-Brain Natriuretic Peptide: 23 pg/mL (12-05 @ 17:24)    Lipid Profile: Cholesterol 212    HDL 49        TSH: Thyroid Stimulating Hormone, Serum: 1.58 uU/mL (12-06 @ 06:20)  Thyroid Stimulating Hormone, Serum: 1.50 uU/mL (12-06 @ 06:20)        EXAM:  XR CHEST PA LAT 2V                            PROCEDURE DATE:  12/05/2018          INTERPRETATION:  PA and lateral chest x-rays     Clinical Indication: Chest pain and shortness of breath.    Comparison: None    PA and lateral chest x-rays are submitted for evaluation. There is no   acute pulmonary infiltrate, pleural effusion, or pneumothorax. The   cardiac silhouette is within normal limits. The trachea is midline. There   is no pulmonary vascular congestion .     Impression: No radiographic evidence for acute cardiopulmonary disease.

## 2018-12-06 NOTE — CONSULT NOTE ADULT - SUBJECTIVE AND OBJECTIVE BOX
PULMONARY CONSULT NOTE      JOANNE HEALY  MRN-[id number]    Patient is a 48y old  Female who presents with a chief complaint of Chest pain (06 Dec 2018 08:32)       History of Present Illness:  Reason for Admission: Chest pain	  History of Present Illness: 	  49 y/o female with PMH of HTN, DM, HLD presented with chest pain and back pain since 1 week. According to  the pt, she was having mild chest pain  last week of about 6/10 intensity initially but increased up to 9/10 when she ran for bus, relieved by rest. Mild chest pain persisted since then and she called her PCP yesterday who recommended to come to ED. She also complained of sharp and shooting back pain since last weekradiating from lumbar vertebra to cervical spine lasting few seconds. Back pain is on and off since then. She mentioned that her friends and family noticed her face swollen. She also endorsed that she has been having  left shoulder pain since 2 months as she has been carrying heavy bags on her left hand. She mentioned that she has mild shortness of breath while walking and mild cough. orthopnea, PND and leg swelling are absent.  She deniees nausea, vomiting, blurring of vision, dizziness.      HISTORY OF PRESENT ILLNESS: As above ,awake,alert,lying in bed. With mild chest pain and sob.    MEDICATIONS  (STANDING):  aspirin  chewable 81 milliGRAM(s) Oral daily  atorvastatin 40 milliGRAM(s) Oral at bedtime  dextrose 5%. 1000 milliLiter(s) (50 mL/Hr) IV Continuous <Continuous>  dextrose 50% Injectable 12.5 Gram(s) IV Push once  dextrose 50% Injectable 25 Gram(s) IV Push once  dextrose 50% Injectable 25 Gram(s) IV Push once  insulin glargine Injectable (LANTUS) 20 Unit(s) SubCutaneous at bedtime  insulin lispro (HumaLOG) corrective regimen sliding scale   SubCutaneous three times a day before meals  lisinopril 10 milliGRAM(s) Oral daily  metoprolol succinate ER 25 milliGRAM(s) Oral daily      MEDICATIONS  (PRN):  dextrose 40% Gel 15 Gram(s) Oral once PRN Blood Glucose LESS THAN 70 milliGRAM(s)/deciliter  glucagon  Injectable 1 milliGRAM(s) IntraMuscular once PRN Glucose LESS THAN 70 milligrams/deciliter      Allergies    NSAIDs (Short breath)  peppers (Rash)    Intolerances        PAST MEDICAL & SURGICAL HISTORY:  Diabetes  Hyperlipidemia  Hypertension  Acute appendicitis      FAMILY HISTORY:  Family history of hypertension (Mother)  Family history of diabetes mellitus (Mother)      SOCIAL HISTORY  Smoking History:     REVIEW OF SYSTEMS:    CONSTITUTIONAL:  No fevers, chills, sweats    HEENT:  Eyes:  No diplopia or blurred vision. ENT:  No earache, sore throat or runny nose.    CARDIOVASCULAR:  No pressure, squeezing, tightness, or heaviness about the chest; no palpitations.    RESPIRATORY:  Per HPI    GASTROINTESTINAL:  No abdominal pain, nausea, vomiting or diarrhea.    GENITOURINARY:  No dysuria, frequency or urgency.    NEUROLOGIC:  No paresthesias, fasciculations, seizures or weakness.    PSYCHIATRIC:  No disorder of thought or mood.    Vital Signs Last 24 Hrs  T(C): 36.3 (06 Dec 2018 07:30), Max: 36.9 (06 Dec 2018 02:47)  T(F): 97.4 (06 Dec 2018 07:30), Max: 98.5 (06 Dec 2018 05:09)  HR: 77 (06 Dec 2018 07:30) (77 - 100)  BP: 136/60 (06 Dec 2018 07:30) (133/48 - 174/95)  BP(mean): --  RR: 17 (06 Dec 2018 07:30) (16 - 18)  SpO2: 98% (06 Dec 2018 07:30) (97% - 98%)  I&O's Detail      PHYSICAL EXAMINATION:    GENERAL: The patient is a well-developed, well-nourished _____in no apparent distress.     HEENT: Head is normocephalic and atraumatic. Extraocular muscles are intact. Mucous membranes are moist.     NECK: Supple.     LUNGS: Clear to auscultation without wheezing, rales, or rhonchi. Respirations unlabored    HEART: Regular rate and rhythm without murmur.    ABDOMEN: Soft, nontender, and nondistended.  No hepatosplenomegaly is noted.    EXTREMITIES: Without any cyanosis, clubbing, rash, lesions or edema.    NEUROLOGIC: Grossly intact.      LABS:                        12.8   6.9   )-----------( 294      ( 06 Dec 2018 06:20 )             40.7     12-06    141  |  105  |  13  ----------------------------<  89  3.7   |  29  |  0.44<L>    Ca    8.7      06 Dec 2018 06:20  Phos  4.0     12-06  Mg     2.0     12-06    TPro  7.0  /  Alb  3.2<L>  /  TBili  0.4  /  DBili  x   /  AST  17  /  ALT  50  /  AlkPhos  97  12-06    PT/INR - ( 06 Dec 2018 06:20 )   PT: 11.8 sec;   INR: 1.06 ratio         PTT - ( 05 Dec 2018 17:24 )  PTT:33.4 sec      CARDIAC MARKERS ( 06 Dec 2018 06:20 )  <0.015 ng/mL / x     / 76 U/L / x     / <1.0 ng/mL  CARDIAC MARKERS ( 05 Dec 2018 23:36 )  <0.015 ng/mL / x     / 81 U/L / x     / <1.0 ng/mL  CARDIAC MARKERS ( 05 Dec 2018 17:24 )  <0.015 ng/mL / x     / x     / x     / x          D-Dimer Assay, Quantitative: 217 ng/mL DDU (12-05-18 @ 17:24)    Serum Pro-Brain Natriuretic Peptide: 23 pg/mL (12-05-18 @ 17:24)          MICROBIOLOGY:    RADIOLOGY & ADDITIONAL STUDIES:    CXR:  < from: Xray Chest 2 Views PA/Lat (12.05.18 @ 18:16) >  Comparison: None    PA and lateral chest x-rays are submitted for evaluation. There is no   acute pulmonary infiltrate, pleural effusion, or pneumothorax. The   cardiac silhouette is within normal limits. The trachea is midline. There   is no pulmonary vascular congestion .     Impression: No radiographic evidence for acute cardiopulmonary disease.    < end of copied text >    Ct scan chest:    ekg;    echo: PULMONARY CONSULT NOTE      JOANNE HEALY  MRN-[id number]    Patient is a 48y old  Female who presents with a chief complaint of Chest pain (06 Dec 2018 08:32)       History of Present Illness:  Reason for Admission: Chest pain	  History of Present Illness: 	  47 y/o female with PMH of HTN, DM, HLD presented with chest pain and back pain since 1 week. According to  the pt, she was having mild chest pain  last week of about 6/10 intensity initially but increased up to 9/10 when she ran for bus, relieved by rest. Mild chest pain persisted since then and she called her PCP yesterday who recommended to come to ED. She also complained of sharp and shooting back pain since last weekradiating from lumbar vertebra to cervical spine lasting few seconds. Back pain is on and off since then. She mentioned that her friends and family noticed her face swollen. She also endorsed that she has been having  left shoulder pain since 2 months as she has been carrying heavy bags on her left hand. She mentioned that she has mild shortness of breath while walking and mild cough. orthopnea, PND and leg swelling are absent.  She deniees nausea, vomiting, blurring of vision, dizziness.      HISTORY OF PRESENT ILLNESS: As above. Awake, alert, OOB to chair in NAD. Occasional sob and Grewal. No wheezing.    MEDICATIONS  (STANDING):  aspirin  chewable 81 milliGRAM(s) Oral daily  atorvastatin 40 milliGRAM(s) Oral at bedtime  dextrose 5%. 1000 milliLiter(s) (50 mL/Hr) IV Continuous <Continuous>  dextrose 50% Injectable 12.5 Gram(s) IV Push once  dextrose 50% Injectable 25 Gram(s) IV Push once  dextrose 50% Injectable 25 Gram(s) IV Push once  insulin glargine Injectable (LANTUS) 20 Unit(s) SubCutaneous at bedtime  insulin lispro (HumaLOG) corrective regimen sliding scale   SubCutaneous three times a day before meals  lisinopril 10 milliGRAM(s) Oral daily  metoprolol succinate ER 25 milliGRAM(s) Oral daily      MEDICATIONS  (PRN):  dextrose 40% Gel 15 Gram(s) Oral once PRN Blood Glucose LESS THAN 70 milliGRAM(s)/deciliter  glucagon  Injectable 1 milliGRAM(s) IntraMuscular once PRN Glucose LESS THAN 70 milligrams/deciliter      Allergies    NSAIDs (Short breath)  peppers (Rash)    Intolerances        PAST MEDICAL & SURGICAL HISTORY:  Diabetes  Hyperlipidemia  Hypertension  Acute appendicitis      FAMILY HISTORY:  Family history of hypertension (Mother)  Family history of diabetes mellitus (Mother)      SOCIAL HISTORY  Smoking History:     REVIEW OF SYSTEMS:    CONSTITUTIONAL:  No fevers, chills, sweats    HEENT:  Eyes:  No diplopia or blurred vision. ENT:  No earache, sore throat or runny nose.    CARDIOVASCULAR:  No pressure, squeezing, tightness, or heaviness about the chest; no palpitations.    RESPIRATORY:  Per HPI    GASTROINTESTINAL:  No abdominal pain, nausea, vomiting or diarrhea.    GENITOURINARY:  No dysuria, frequency or urgency.    NEUROLOGIC:  No paresthesias, fasciculations, seizures or weakness.    PSYCHIATRIC:  No disorder of thought or mood.    Vital Signs Last 24 Hrs  T(C): 36.3 (06 Dec 2018 07:30), Max: 36.9 (06 Dec 2018 02:47)  T(F): 97.4 (06 Dec 2018 07:30), Max: 98.5 (06 Dec 2018 05:09)  HR: 77 (06 Dec 2018 07:30) (77 - 100)  BP: 136/60 (06 Dec 2018 07:30) (133/48 - 174/95)  BP(mean): --  RR: 17 (06 Dec 2018 07:30) (16 - 18)  SpO2: 98% (06 Dec 2018 07:30) (97% - 98%)  I&O's Detail      PHYSICAL EXAMINATION:    GENERAL: The patient is a well-developed, well-nourished _____in no apparent distress.     HEENT: Head is normocephalic and atraumatic. Extraocular muscles are intact. Mucous membranes are moist.     NECK: Supple.     LUNGS: Clear to auscultation without wheezing, rales, or rhonchi. Respirations unlabored    HEART: Regular rate and rhythm without murmur.    ABDOMEN: Soft, nontender, and nondistended.  No hepatosplenomegaly is noted.    EXTREMITIES: Without any cyanosis, clubbing, rash, lesions or edema.    NEUROLOGIC: Grossly intact.      LABS:                        12.8   6.9   )-----------( 294      ( 06 Dec 2018 06:20 )             40.7     12-06    141  |  105  |  13  ----------------------------<  89  3.7   |  29  |  0.44<L>    Ca    8.7      06 Dec 2018 06:20  Phos  4.0     12-06  Mg     2.0     12-06    TPro  7.0  /  Alb  3.2<L>  /  TBili  0.4  /  DBili  x   /  AST  17  /  ALT  50  /  AlkPhos  97  12-06    PT/INR - ( 06 Dec 2018 06:20 )   PT: 11.8 sec;   INR: 1.06 ratio         PTT - ( 05 Dec 2018 17:24 )  PTT:33.4 sec      CARDIAC MARKERS ( 06 Dec 2018 06:20 )  <0.015 ng/mL / x     / 76 U/L / x     / <1.0 ng/mL  CARDIAC MARKERS ( 05 Dec 2018 23:36 )  <0.015 ng/mL / x     / 81 U/L / x     / <1.0 ng/mL  CARDIAC MARKERS ( 05 Dec 2018 17:24 )  <0.015 ng/mL / x     / x     / x     / x          D-Dimer Assay, Quantitative: 217 ng/mL DDU (12-05-18 @ 17:24)    Serum Pro-Brain Natriuretic Peptide: 23 pg/mL (12-05-18 @ 17:24)          MICROBIOLOGY:    RADIOLOGY & ADDITIONAL STUDIES:    CXR:  < from: Xray Chest 2 Views PA/Lat (12.05.18 @ 18:16) >  Comparison: None    PA and lateral chest x-rays are submitted for evaluation. There is no   acute pulmonary infiltrate, pleural effusion, or pneumothorax. The   cardiac silhouette is within normal limits. The trachea is midline. There   is no pulmonary vascular congestion .     Impression: No radiographic evidence for acute cardiopulmonary disease.    < end of copied text >    Ct scan chest:    ekg;  < from: Nuclear Stress Test-Exercise (12.06.18 @ 08:44) >  ------------------------------------------------------------------------    IMPRESSIONS:Normal Study  * Normal stress ECG at this level of exercise.  * Review of raw data shows: The study is of good technical  quality.  * The left ventricle was normal in size. Normal myocardial  perfusion scan, with no evidence of infarction or  inducible ischemia.  * Gated wall motion analysis is performed, and shows  normal wall motion with post stress LVEF of 63%.    < end of copied text >    echo:

## 2018-12-06 NOTE — ED ADULT NURSE REASSESSMENT NOTE - NS ED NURSE REASSESS COMMENT FT1
Pt axox3, Pt denies any chest pain, SOB, complains of mild back pain. pt not in distress. Pt is on the cardiac monitor wit normal sinus rhythm.

## 2018-12-06 NOTE — PROGRESS NOTE ADULT - PROBLEM SELECTOR PLAN 1
EKG NSR  Trops negative x3  NST negative  echo pending  asa, statin, skylerer  cardio - Dr. Sheikh  d/c planning

## 2018-12-06 NOTE — PROGRESS NOTE ADULT - SUBJECTIVE AND OBJECTIVE BOX
47 y/o female with PMH of HTN, DM, HLD presented with chest pain and back pain since 1 week. According to  the pt, she was having mild chest pain  last week of about 6/10 intensity initially but increased up to 9/10 when she ran for bus, relieved by rest. Mild chest pain persisted since then and she called her PCP yesterday who recommended to come to ED. She also complained of sharp and shooting back pain since last weekradiating from lumbar vertebra to cervical spine lasting few seconds. Back pain is on and off since then. She mentioned that her friends and family noticed her face swollen. She also endorsed that she has been having  left shoulder pain since 2 months as she has been carrying heavy bags on her left hand. She mentioned that she has mild shortness of breath while walking and mild cough. orthopnea, PND and leg swelling are absent.  She deniees nausea, vomiting, blurring of vision, dizziness.    pt seen in tele [  ], reg med floor [   ], bed [  ], chair at bedside [   ], a+o x3 [  ], lethargic [  ],  nad [  ]    boyd [  ], ngt [  ], peg [  ], et tube [  ], cent line [  ], picc line [  ]        Allergies    NSAIDs (Short breath)  peppers (Rash)        Vitals    T(F): 98.5 (12-06-18 @ 05:09), Max: 98.5 (12-06-18 @ 05:09)  HR: 80 (12-06-18 @ 05:09) (80 - 100)  BP: 138/60 (12-06-18 @ 05:09) (133/48 - 174/95)  RR: 16 (12-06-18 @ 05:09) (16 - 18)  SpO2: 97% (12-06-18 @ 05:09) (97% - 98%)  Wt(kg): --  CAPILLARY BLOOD GLUCOSE          Labs                          12.8   6.9   )-----------( 294      ( 06 Dec 2018 06:20 )             40.7       12-06    141  |  105  |  13  ----------------------------<  89  3.7   |  29  |  0.44<L>    Ca    8.7      06 Dec 2018 06:20  Phos  4.0     12-06  Mg     2.0     12-06    TPro  7.0  /  Alb  3.2<L>  /  TBili  0.4  /  DBili  x   /  AST  17  /  ALT  50  /  AlkPhos  97  12-06      CARDIAC MARKERS ( 06 Dec 2018 06:20 )  <0.015 ng/mL / x     / 76 U/L / x     / <1.0 ng/mL  CARDIAC MARKERS ( 05 Dec 2018 23:36 )  <0.015 ng/mL / x     / 81 U/L / x     / <1.0 ng/mL  CARDIAC MARKERS ( 05 Dec 2018 17:24 )  <0.015 ng/mL / x     / x     / x     / x                Radiology Results      Meds    MEDICATIONS  (STANDING):  aspirin  chewable 81 milliGRAM(s) Oral daily  atorvastatin 40 milliGRAM(s) Oral at bedtime  dextrose 5%. 1000 milliLiter(s) (50 mL/Hr) IV Continuous <Continuous>  dextrose 50% Injectable 12.5 Gram(s) IV Push once  dextrose 50% Injectable 25 Gram(s) IV Push once  dextrose 50% Injectable 25 Gram(s) IV Push once  insulin glargine Injectable (LANTUS) 20 Unit(s) SubCutaneous at bedtime  insulin lispro (HumaLOG) corrective regimen sliding scale   SubCutaneous three times a day before meals  lisinopril 10 milliGRAM(s) Oral daily  metoprolol succinate ER 25 milliGRAM(s) Oral daily      MEDICATIONS  (PRN):  dextrose 40% Gel 15 Gram(s) Oral once PRN Blood Glucose LESS THAN 70 milliGRAM(s)/deciliter  glucagon  Injectable 1 milliGRAM(s) IntraMuscular once PRN Glucose LESS THAN 70 milligrams/deciliter      Physical Exam    Neuro :  no focal deficits  Respiratory: CTA B/L  CV: RRR, S1S2, no murmurs,   Abdominal: Soft, NT, ND +BS,  Extremities: No edema, + peripheral pulses    ASSESSMENT    Chest pain  Diabetes  Hyperlipidemia  Hypertension  Acute appendicitis      PLAN 49 y/o female with PMH of HTN, DM, HLD presented with chest pain and back pain since 1 week. According to  the pt, she was having mild chest pain  last week of about 6/10 intensity initially but increased up to 9/10 when she ran for bus, relieved by rest. Mild chest pain persisted since then and she called her PCP yesterday who recommended to come to ED. She also complained of sharp and shooting back pain since last weekradiating from lumbar vertebra to cervical spine lasting few seconds. Back pain is on and off since then. She mentioned that her friends and family noticed her face swollen. She also endorsed that she has been having  left shoulder pain since 2 months as she has been carrying heavy bags on her left hand. She mentioned that she has mild shortness of breath while walking and mild cough. orthopnea, PND and leg swelling are absent.  She deniees nausea, vomiting, blurring of vision, dizziness.    pt seen in tele [x  ], reg med floor [   ], bed [x  ], chair at bedside [   ], a+o x3 [x  ], lethargic [  ],  nad [  ]      Allergies    NSAIDs (Short breath)  peppers (Rash)        Vitals    T(F): 98.5 (12-06-18 @ 05:09), Max: 98.5 (12-06-18 @ 05:09)  HR: 80 (12-06-18 @ 05:09) (80 - 100)  BP: 138/60 (12-06-18 @ 05:09) (133/48 - 174/95)  RR: 16 (12-06-18 @ 05:09) (16 - 18)  SpO2: 97% (12-06-18 @ 05:09) (97% - 98%)  Wt(kg): --  CAPILLARY BLOOD GLUCOSE          Labs                          12.8   6.9   )-----------( 294      ( 06 Dec 2018 06:20 )             40.7       12-06    141  |  105  |  13  ----------------------------<  89  3.7   |  29  |  0.44<L>    Ca    8.7      06 Dec 2018 06:20  Phos  4.0     12-06  Mg     2.0     12-06    TPro  7.0  /  Alb  3.2<L>  /  TBili  0.4  /  DBili  x   /  AST  17  /  ALT  50  /  AlkPhos  97  12-06      CARDIAC MARKERS ( 06 Dec 2018 06:20 )  <0.015 ng/mL / x     / 76 U/L / x     / <1.0 ng/mL  CARDIAC MARKERS ( 05 Dec 2018 23:36 )  <0.015 ng/mL / x     / 81 U/L / x     / <1.0 ng/mL  CARDIAC MARKERS ( 05 Dec 2018 17:24 )  <0.015 ng/mL / x     / x     / x     / x            Radiology Results        Meds    MEDICATIONS  (STANDING):  aspirin  chewable 81 milliGRAM(s) Oral daily  atorvastatin 40 milliGRAM(s) Oral at bedtime  dextrose 5%. 1000 milliLiter(s) (50 mL/Hr) IV Continuous <Continuous>  dextrose 50% Injectable 12.5 Gram(s) IV Push once  dextrose 50% Injectable 25 Gram(s) IV Push once  dextrose 50% Injectable 25 Gram(s) IV Push once  insulin glargine Injectable (LANTUS) 20 Unit(s) SubCutaneous at bedtime  insulin lispro (HumaLOG) corrective regimen sliding scale   SubCutaneous three times a day before meals  lisinopril 10 milliGRAM(s) Oral daily  metoprolol succinate ER 25 milliGRAM(s) Oral daily      MEDICATIONS  (PRN):  dextrose 40% Gel 15 Gram(s) Oral once PRN Blood Glucose LESS THAN 70 milliGRAM(s)/deciliter  glucagon  Injectable 1 milliGRAM(s) IntraMuscular once PRN Glucose LESS THAN 70 milligrams/deciliter      Physical Exam    Neuro :  no focal deficits  Respiratory: CTA B/L  CV: RRR, S1S2, no murmurs,   Abdominal: Soft, NT, ND +BS,  Extremities: No edema, + peripheral pulses    ASSESSMENT    Chest pain r/o ACS  h/o Diabetes  Hyperlipidemia  Hypertension  Acute appendicitis      PLAN    cont tele,   acs protocol,   lopressor, aspirin, statin,  trop x3 neg   cardio cons noted  f/u echo  f/u stress test  cont current meds  d/c plan if stress and echo within normal limits

## 2018-12-06 NOTE — CONSULT NOTE ADULT - PROBLEM SELECTOR RECOMMENDATION 9
ACS protocol   Aspirin, statin and betablocker   F/u Echocardiography and stress test today  Cardiology consult noted ACS protocol   Aspirin, statin and betablocker   F/u Echocardiography   Cardiology consult noted  Stress test neg for ischemia

## 2018-12-06 NOTE — CONSULT NOTE ADULT - ASSESSMENT
47 y/o female with PMH of HTN, DM, HLD presented with chest pain and back pain since 1 week.  1.Tele monitoring.  2.Echocardiogram.  3.Stress test-r/o ischemia.  4.DM-Insulin.  5.HTN-Ace and b blocker.  6.Lipid d/o-statin.  7.GI and DVT prophylaxis.

## 2018-12-06 NOTE — CONSULT NOTE ADULT - ASSESSMENT
47 y/o female with PMH of HTN, DM, HLD presented with chest pain and back pain since 1 week. According to  the pt, she was having mild chest pain  last week of about 6/10 intensity initially but increased up to 9/10 when she ran for bus, relieved by rest. Mild chest pain persisted since then and she called her PCP yesterday who recommended to come to ED. S

## 2018-12-07 ENCOUNTER — TRANSCRIPTION ENCOUNTER (OUTPATIENT)
Age: 48
End: 2018-12-07

## 2018-12-07 VITALS
HEART RATE: 81 BPM | DIASTOLIC BLOOD PRESSURE: 54 MMHG | OXYGEN SATURATION: 99 % | TEMPERATURE: 98 F | RESPIRATION RATE: 16 BRPM | SYSTOLIC BLOOD PRESSURE: 119 MMHG

## 2018-12-07 LAB
GLUCOSE BLDC GLUCOMTR-MCNC: 105 MG/DL — HIGH (ref 70–99)
GLUCOSE BLDC GLUCOMTR-MCNC: 147 MG/DL — HIGH (ref 70–99)

## 2018-12-07 PROCEDURE — G0378: CPT

## 2018-12-07 PROCEDURE — 85027 COMPLETE CBC AUTOMATED: CPT

## 2018-12-07 PROCEDURE — 73030 X-RAY EXAM OF SHOULDER: CPT | Mod: 26,LT

## 2018-12-07 PROCEDURE — 99285 EMERGENCY DEPT VISIT HI MDM: CPT | Mod: 25

## 2018-12-07 PROCEDURE — 85379 FIBRIN DEGRADATION QUANT: CPT

## 2018-12-07 PROCEDURE — 82607 VITAMIN B-12: CPT

## 2018-12-07 PROCEDURE — 83036 HEMOGLOBIN GLYCOSYLATED A1C: CPT

## 2018-12-07 PROCEDURE — 85730 THROMBOPLASTIN TIME PARTIAL: CPT

## 2018-12-07 PROCEDURE — 84443 ASSAY THYROID STIM HORMONE: CPT

## 2018-12-07 PROCEDURE — 73030 X-RAY EXAM OF SHOULDER: CPT

## 2018-12-07 PROCEDURE — 85610 PROTHROMBIN TIME: CPT

## 2018-12-07 PROCEDURE — 82553 CREATINE MB FRACTION: CPT

## 2018-12-07 PROCEDURE — 78452 HT MUSCLE IMAGE SPECT MULT: CPT

## 2018-12-07 PROCEDURE — 82746 ASSAY OF FOLIC ACID SERUM: CPT

## 2018-12-07 PROCEDURE — 83735 ASSAY OF MAGNESIUM: CPT

## 2018-12-07 PROCEDURE — A9502: CPT

## 2018-12-07 PROCEDURE — 82550 ASSAY OF CK (CPK): CPT

## 2018-12-07 PROCEDURE — 84702 CHORIONIC GONADOTROPIN TEST: CPT

## 2018-12-07 PROCEDURE — 82306 VITAMIN D 25 HYDROXY: CPT

## 2018-12-07 PROCEDURE — 80053 COMPREHEN METABOLIC PANEL: CPT

## 2018-12-07 PROCEDURE — 93017 CV STRESS TEST TRACING ONLY: CPT

## 2018-12-07 PROCEDURE — 82962 GLUCOSE BLOOD TEST: CPT

## 2018-12-07 PROCEDURE — 80061 LIPID PANEL: CPT

## 2018-12-07 PROCEDURE — 83880 ASSAY OF NATRIURETIC PEPTIDE: CPT

## 2018-12-07 PROCEDURE — 84484 ASSAY OF TROPONIN QUANT: CPT

## 2018-12-07 PROCEDURE — 93306 TTE W/DOPPLER COMPLETE: CPT

## 2018-12-07 PROCEDURE — 93005 ELECTROCARDIOGRAM TRACING: CPT

## 2018-12-07 PROCEDURE — 71046 X-RAY EXAM CHEST 2 VIEWS: CPT

## 2018-12-07 PROCEDURE — 84100 ASSAY OF PHOSPHORUS: CPT

## 2018-12-07 RX ORDER — TRAMADOL HYDROCHLORIDE 50 MG/1
25 TABLET ORAL ONCE
Qty: 0 | Refills: 0 | Status: DISCONTINUED | OUTPATIENT
Start: 2018-12-07 | End: 2018-12-07

## 2018-12-07 RX ORDER — ERGOCALCIFEROL 1.25 MG/1
1 CAPSULE ORAL
Qty: 4 | Refills: 0
Start: 2018-12-07 | End: 2019-01-05

## 2018-12-07 RX ADMIN — TRAMADOL HYDROCHLORIDE 25 MILLIGRAM(S): 50 TABLET ORAL at 09:24

## 2018-12-07 RX ADMIN — ENOXAPARIN SODIUM 40 MILLIGRAM(S): 100 INJECTION SUBCUTANEOUS at 11:24

## 2018-12-07 RX ADMIN — TRAMADOL HYDROCHLORIDE 25 MILLIGRAM(S): 50 TABLET ORAL at 08:40

## 2018-12-07 RX ADMIN — LISINOPRIL 10 MILLIGRAM(S): 2.5 TABLET ORAL at 06:56

## 2018-12-07 RX ADMIN — ERGOCALCIFEROL 50000 UNIT(S): 1.25 CAPSULE ORAL at 11:24

## 2018-12-07 RX ADMIN — Medication 81 MILLIGRAM(S): at 11:24

## 2018-12-07 RX ADMIN — Medication 25 MILLIGRAM(S): at 06:56

## 2018-12-07 NOTE — PROGRESS NOTE ADULT - SUBJECTIVE AND OBJECTIVE BOX
PGY1 Note discussed with supervising resident and primary attending.    Patient is a 48y old  Female who presents with a chief complaint of Chest pain (06 Dec 2018 10:26)      INTERVAL HPI/OVERNIGHT EVENTS:    Ms. Delgado is seen at the bedside. No new complaints.    MEDICATIONS  (STANDING):  aspirin  chewable 81 milliGRAM(s) Oral daily  atorvastatin 40 milliGRAM(s) Oral at bedtime  dextrose 5%. 1000 milliLiter(s) (50 mL/Hr) IV Continuous <Continuous>  dextrose 50% Injectable 12.5 Gram(s) IV Push once  dextrose 50% Injectable 25 Gram(s) IV Push once  dextrose 50% Injectable 25 Gram(s) IV Push once  insulin glargine Injectable (LANTUS) 20 Unit(s) SubCutaneous at bedtime  insulin lispro (HumaLOG) corrective regimen sliding scale   SubCutaneous three times a day before meals  lisinopril 10 milliGRAM(s) Oral daily  metoprolol succinate ER 25 milliGRAM(s) Oral daily    MEDICATIONS  (PRN):  dextrose 40% Gel 15 Gram(s) Oral once PRN Blood Glucose LESS THAN 70 milliGRAM(s)/deciliter  glucagon  Injectable 1 milliGRAM(s) IntraMuscular once PRN Glucose LESS THAN 70 milligrams/deciliter      Allergies    NSAIDs (Short breath)  peppers (Rash)    Intolerances        REVIEW OF SYSTEMS:  CONSTITUTIONAL: No fever, weight loss, or fatigue  RESPIRATORY: No cough, wheezing, chills or hemoptysis; No shortness of breath  CARDIOVASCULAR: No chest pain, palpitations, dizziness, or leg swelling  GASTROINTESTINAL: No abdominal or epigastric pain. No nausea, vomiting, or hematemesis; No diarrhea or constipation. No melena or hematochezia.  NEUROLOGICAL: No headaches, memory loss, loss of strength, numbness, or tremors  SKIN: No itching, burning, rashes, or lesions  Extremities: lt shoulder pain    Vital Signs Last 24 Hrs  T(C): 36.7 (06 Dec 2018 11:07), Max: 36.9 (06 Dec 2018 02:47)  T(F): 98 (06 Dec 2018 11:07), Max: 98.5 (06 Dec 2018 05:09)  HR: 86 (06 Dec 2018 11:07) (77 - 100)  BP: 135/55 (06 Dec 2018 11:07) (133/48 - 174/95)  BP(mean): --  RR: 18 (06 Dec 2018 11:07) (16 - 18)  SpO2: 99% (06 Dec 2018 11:07) (97% - 99%)    PHYSICAL EXAM:  GENERAL: NAD  HEAD:  Atraumatic, Normocephalic  EYES: EOMI, PERRLA, conjunctiva and sclera clear  NECK: Supple, No JVD, Normal thyroid  CHEST/LUNG: Clear to percussion bilaterally; No rales, rhonchi, wheezing, or rubs  HEART: Regular rate and rhythm; No murmurs, rubs, or gallops  ABDOMEN: Soft, Nontender, Nondistended; Bowel sounds present  NERVOUS SYSTEM:  Alert & Oriented X3, Good concentration; Motor Strength 5/5 B/L   EXTREMITIES:  2+ Peripheral Pulses, No clubbing, cyanosis, or edema    LABS:                        12.8   6.9   )-----------( 294      ( 06 Dec 2018 06:20 )             40.7     12-06    141  |  105  |  13  ----------------------------<  89  3.7   |  29  |  0.44<L>    Ca    8.7      06 Dec 2018 06:20  Phos  4.0     12-06  Mg     2.0     12-06    TPro  7.0  /  Alb  3.2<L>  /  TBili  0.4  /  DBili  x   /  AST  17  /  ALT  50  /  AlkPhos  97  12-06    PT/INR - ( 06 Dec 2018 06:20 )   PT: 11.8 sec;   INR: 1.06 ratio         PTT - ( 05 Dec 2018 17:24 )  PTT:33.4 sec    CAPILLARY BLOOD GLUCOSE      POCT Blood Glucose.: 182 mg/dL (06 Dec 2018 11:59)      RADIOLOGY & ADDITIONAL TESTS:    Imaging Personally Reviewed: [ X ] YES  [ ] NO    Consultant(s) Notes Reviewed:  [X ] YES  [ ] NO

## 2018-12-07 NOTE — PROGRESS NOTE ADULT - PROBLEM SELECTOR PLAN 1
ACS protocol   Aspirin, statin and betablocker   F/u Echocardiography   Cardiology consult noted  Stress test neg for ischemia. ACS ruled out  Aspirin, statin and betablocker   F/u Echocardiography   Cardiology consult noted  Stress test neg for ischemia.

## 2018-12-07 NOTE — PROGRESS NOTE ADULT - PROBLEM SELECTOR PLAN 1
resolved  EKG NSR  Trops negative x3  NST negative  echo pending  asa, statin, eleanor  cardio - Dr. Sheikh

## 2018-12-07 NOTE — DISCHARGE NOTE ADULT - MEDICATION SUMMARY - MEDICATIONS TO TAKE
I will START or STAY ON the medications listed below when I get home from the hospital:    lisinopril 10 mg oral tablet  -- 1 tab(s) by mouth once a day  -- Indication: For Hypertension    Tresiba FlexTouch 200 units/mL subcutaneous solution  -- 55 unit(s) subcutaneous once a day (in the morning)  -- Indication: For Diabetes    Jentadueto 2.5 mg-1000 mg oral tablet  -- 1 tab(s) by mouth 2 times a day  -- Indication: For Diabetes    Jardiance 10 mg oral tablet  -- 1 tab(s) by mouth once a day (in the morning)  -- Indication: For Diabetes    atorvastatin 20 mg oral tablet  -- 1 tab(s) by mouth once a day  -- Indication: For Hyperlipidemia    Vitamin D2 50,000 intl units (1.25 mg) oral capsule  -- 1 cap(s) by mouth once a week  -- Indication: For Vitamin D Deficiency

## 2018-12-07 NOTE — DISCHARGE NOTE ADULT - CARE PLAN
Principal Discharge DX:	Chest pain  Goal:	Rule out ACS  Assessment and plan of treatment:	You were admitted to the hospital with complaint of chest pain. Your cardiac enzymes were unremarkable and your stress test was normal. Your echocardiogram was unremarkable. We ruled out acute coronary syndrome. Your pain was likely either from gastric acid reflux or muskuloskeletal in nature as it began when you were exercising. Follow up with your PCP in 1 week.  Secondary Diagnosis:	Shoulder pain  Goal:	Outpatient management  Assessment and plan of treatment:	You informed us you have been experiencing shoulder pain. Our orthopedic team evaluated you and advised no intervention at this time. Your xray was negative for any fracture or evidence of injury. Follow up with your primary care doctor outpatient for further management.  Secondary Diagnosis:	Hypertension  Goal:	BP Control  Assessment and plan of treatment:	Continue your home antihypertensives.  Secondary Diagnosis:	Diabetes  Goal:	Blood glucose control  Assessment and plan of treatment:	Continue your home medications as prescribed and follow up with your PCP in 1 week.

## 2018-12-07 NOTE — PROGRESS NOTE ADULT - PROBLEM SELECTOR PLAN 2
oxygen supp  Bronchodilators   PFTs as OP.
lisinopril 10mg qd
ortho consulted  xray left shoulder pending

## 2018-12-07 NOTE — PROGRESS NOTE ADULT - ASSESSMENT
47 y/o female with PMH of HTN, DM, HLD presented with chest pain and back pain since 1 week.  1.Tele monitoring.  2.Echocardiogram.  3.Ortho eval called.  4.DM-Insulin.  5.HTN-Ace and b blocker.  6.Lipid d/o-statin.  7.GI and DVT prophylaxis.

## 2018-12-07 NOTE — PROGRESS NOTE ADULT - SUBJECTIVE AND OBJECTIVE BOX
Patient is a 48y old  Female who presents with a chief complaint of Chest pain (07 Dec 2018 07:56)    pt seen in tele [x  ], reg med floor [   ], bed [x  ], chair at bedside [   ], a+o x3 [x  ], lethargic [  ],  nad [  ]    Allergies    NSAIDs (Short breath)  peppers (Rash)        Vitals    T(F): 97.9 (12-07-18 @ 07:53), Max: 99.6 (12-07-18 @ 00:08)  HR: 81 (12-07-18 @ 07:53) (78 - 92)  BP: 119/54 (12-07-18 @ 07:53) (119/54 - 153/61)  RR: 16 (12-07-18 @ 07:53) (16 - 18)  SpO2: 99% (12-07-18 @ 07:53) (96% - 99%)  Wt(kg): --  CAPILLARY BLOOD GLUCOSE      POCT Blood Glucose.: 105 mg/dL (07 Dec 2018 08:07)      Labs                          12.8   6.9   )-----------( 294      ( 06 Dec 2018 06:20 )             40.7       12-06    141  |  105  |  13  ----------------------------<  89  3.7   |  29  |  0.44<L>    Ca    8.7      06 Dec 2018 06:20  Phos  4.0     12-06  Mg     2.0     12-06    TPro  7.0  /  Alb  3.2<L>  /  TBili  0.4  /  DBili  x   /  AST  17  /  ALT  50  /  AlkPhos  97  12-06      CARDIAC MARKERS ( 06 Dec 2018 06:20 )  <0.015 ng/mL / x     / 76 U/L / x     / <1.0 ng/mL  CARDIAC MARKERS ( 05 Dec 2018 23:36 )  <0.015 ng/mL / x     / 81 U/L / x     / <1.0 ng/mL  CARDIAC MARKERS ( 05 Dec 2018 17:24 )  <0.015 ng/mL / x     / x     / x     / x                Radiology Results      Meds    MEDICATIONS  (STANDING):  aspirin  chewable 81 milliGRAM(s) Oral daily  atorvastatin 40 milliGRAM(s) Oral at bedtime  dextrose 5%. 1000 milliLiter(s) (50 mL/Hr) IV Continuous <Continuous>  dextrose 50% Injectable 12.5 Gram(s) IV Push once  dextrose 50% Injectable 25 Gram(s) IV Push once  dextrose 50% Injectable 25 Gram(s) IV Push once  enoxaparin Injectable 40 milliGRAM(s) SubCutaneous daily  ergocalciferol 96081 Unit(s) Oral <User Schedule>  insulin glargine Injectable (LANTUS) 20 Unit(s) SubCutaneous at bedtime  insulin lispro (HumaLOG) corrective regimen sliding scale   SubCutaneous three times a day before meals  lisinopril 10 milliGRAM(s) Oral daily  metoprolol succinate ER 25 milliGRAM(s) Oral daily      MEDICATIONS  (PRN):  dextrose 40% Gel 15 Gram(s) Oral once PRN Blood Glucose LESS THAN 70 milliGRAM(s)/deciliter  glucagon  Injectable 1 milliGRAM(s) IntraMuscular once PRN Glucose LESS THAN 70 milligrams/deciliter    Physical Exam    Neuro :  no focal deficits  Respiratory: CTA B/L  CV: RRR, S1S2, no murmurs,   Abdominal: Soft, NT, ND +BS,  Extremities: No edema, + peripheral pulses    ASSESSMENT    Chest pain r/o ACS  h/o Diabetes  Hyperlipidemia  Hypertension  Acute appendicitis      PLAN    cont tele,   acs protocol,   lopressor, aspirin, statin,  trop x3 neg   cardio cons noted  f/u echo  f/u stress test  cont current meds  d/c plan if stress and echo within normal limits Patient is a 48y old  Female who presents with a chief complaint of Chest pain (07 Dec 2018 07:56)    pt seen in tele [x  ], reg med floor [   ], bed [x  ], chair at bedside [   ], a+o x3 [x  ], lethargic [  ],  nad [  ]    Allergies    NSAIDs (Short breath)  peppers (Rash)        Vitals    T(F): 97.9 (12-07-18 @ 07:53), Max: 99.6 (12-07-18 @ 00:08)  HR: 81 (12-07-18 @ 07:53) (78 - 92)  BP: 119/54 (12-07-18 @ 07:53) (119/54 - 153/61)  RR: 16 (12-07-18 @ 07:53) (16 - 18)  SpO2: 99% (12-07-18 @ 07:53) (96% - 99%)  Wt(kg): --  CAPILLARY BLOOD GLUCOSE      POCT Blood Glucose.: 105 mg/dL (07 Dec 2018 08:07)      Labs                          12.8   6.9   )-----------( 294      ( 06 Dec 2018 06:20 )             40.7       12-06    141  |  105  |  13  ----------------------------<  89  3.7   |  29  |  0.44<L>    Ca    8.7      06 Dec 2018 06:20  Phos  4.0     12-06  Mg     2.0     12-06    TPro  7.0  /  Alb  3.2<L>  /  TBili  0.4  /  DBili  x   /  AST  17  /  ALT  50  /  AlkPhos  97  12-06      CARDIAC MARKERS ( 06 Dec 2018 06:20 )  <0.015 ng/mL / x     / 76 U/L / x     / <1.0 ng/mL  CARDIAC MARKERS ( 05 Dec 2018 23:36 )  <0.015 ng/mL / x     / 81 U/L / x     / <1.0 ng/mL  CARDIAC MARKERS ( 05 Dec 2018 17:24 )  <0.015 ng/mL / x     / x     / x     / x          < from: Nuclear Stress Test-Exercise (12.06.18 @ 08:44) >  IMPRESSIONS:Normal Study  * Normal stress ECG at this level of exercise.  * Review of raw data shows: The study is of good technical  quality.  * The left ventricle was normal in size. Normal myocardial  perfusion scan, with no evidence of infarction or  inducible ischemia.  * Gated wall motion analysis is performed, and shows  normal wall motion with post stress LVEF of 63%.      < end of copied text >        < from: Transthoracic Echocardiogram (12.06.18 @ 07:13) >  CONCLUSIONS:  1. Trace mitral regurgitation.  2. Normal left ventricular internal dimensions and wall  thicknesses.  3. Endocardium not well visualized; grossly normal left  ventricular systolic function.  4. Normal right ventricular size and function.  < from: Transthoracic Echocardiogram (12.06.18 @ 07:13) >  Ejection Fraction Visual Estimate: >55 %    < end of copied text >    < end of copied text >        Radiology Results      Meds    MEDICATIONS  (STANDING):  aspirin  chewable 81 milliGRAM(s) Oral daily  atorvastatin 40 milliGRAM(s) Oral at bedtime  dextrose 5%. 1000 milliLiter(s) (50 mL/Hr) IV Continuous <Continuous>  dextrose 50% Injectable 12.5 Gram(s) IV Push once  dextrose 50% Injectable 25 Gram(s) IV Push once  dextrose 50% Injectable 25 Gram(s) IV Push once  enoxaparin Injectable 40 milliGRAM(s) SubCutaneous daily  ergocalciferol 32088 Unit(s) Oral <User Schedule>  insulin glargine Injectable (LANTUS) 20 Unit(s) SubCutaneous at bedtime  insulin lispro (HumaLOG) corrective regimen sliding scale   SubCutaneous three times a day before meals  lisinopril 10 milliGRAM(s) Oral daily  metoprolol succinate ER 25 milliGRAM(s) Oral daily      MEDICATIONS  (PRN):  dextrose 40% Gel 15 Gram(s) Oral once PRN Blood Glucose LESS THAN 70 milliGRAM(s)/deciliter  glucagon  Injectable 1 milliGRAM(s) IntraMuscular once PRN Glucose LESS THAN 70 milligrams/deciliter    Physical Exam    Neuro :  no focal deficits  Respiratory: CTA B/L  CV: RRR, S1S2, no murmurs,   Abdominal: Soft, NT, ND +BS,  Extremities: No edema, + peripheral pulses    ASSESSMENT    Chest pain r/o ACS  left shoulder pain possible 2nd to bursitis  h/o Diabetes  Hyperlipidemia  Hypertension  Acute appendicitis      PLAN    d/c tele,   lopressor, aspirin, statin,  trop x3 neg   cardio f/u noted  echo with ef >55% noted above  stress test test neg noted above  f/u xray left shoulder  start ibuprophen 600mg prn  ortho cons  cont current meds  d/c plan pending shoulder xray

## 2018-12-07 NOTE — PROGRESS NOTE ADULT - ASSESSMENT
49 y/o female with PMH of HTN, DM, HLD presented with chest pain and back pain since 1 week. According to  the pt, she was having mild chest pain  last week of about 6/10 intensity initially but increased up to 9/10 when she ran for bus, relieved by rest. Mild chest pain persisted since then and she called her PCP yesterday who recommended to come to ED. She also complained of sharp and shooting back pain since last weekradiating from lumbar vertebra to cervical spine lasting few seconds. Back pain is on and off since then. She mentioned that her friends and family noticed her face swollen. She also endorsed that she has been having  left shoulder pain since 2 months as she has been carrying heavy bags on her left hand. She mentioned that she has mild shortness of breath while walking and mild cough. orthopnea, PND and leg swelling are absent.  She deniees nausea, vomiting, blurring of vision, dizziness.      NST negative. Echo pending. Ortho f/u. Xray of lt shoulder.

## 2018-12-07 NOTE — DISCHARGE NOTE ADULT - PATIENT PORTAL LINK FT
You can access the [facility] Patient Portal, offered by [facility], by registering with the following website: [email or web address]

## 2018-12-07 NOTE — PROGRESS NOTE ADULT - SUBJECTIVE AND OBJECTIVE BOX
Patient is a 48y old  Female who presents with a chief complaint of Chest pain (07 Dec 2018 10:06)    Awake, alert, OOB to chair in NAD. Occasional sob and Grewal. No wheezing.    INTERVAL HPI/OVERNIGHT EVENTS:      VITAL SIGNS:  T(F): 97.9 (12-07-18 @ 07:53)  HR: 81 (12-07-18 @ 07:53)  BP: 119/54 (12-07-18 @ 07:53)  RR: 16 (12-07-18 @ 07:53)  SpO2: 99% (12-07-18 @ 07:53)  Wt(kg): --  I&O's Detail          REVIEW OF SYSTEMS:    CONSTITUTIONAL:  No fevers, chills, sweats    HEENT:  Eyes:  No diplopia or blurred vision. ENT:  No earache, sore throat or runny nose.    CARDIOVASCULAR:  No pressure, squeezing, tightness, or heaviness about the chest; no palpitations.    RESPIRATORY:  Per HPI    GASTROINTESTINAL:  No abdominal pain, nausea, vomiting or diarrhea.    GENITOURINARY:  No dysuria, frequency or urgency.    NEUROLOGIC:  No paresthesias, fasciculations, seizures or weakness.    PSYCHIATRIC:  No disorder of thought or mood.      PHYSICAL EXAM:    Constitutional: Well developed and nourished  Eyes:Perrla  ENMT: normal  Neck:supple  Respiratory: good air entry  Cardiovascular: S1 S2 regular  Gastrointestinal: Soft, Non tender  Extremities: No edema  Vascular:normal  Neurological:Awake, alert,Ox3  Musculoskeletal:Normal      MEDICATIONS  (STANDING):  aspirin  chewable 81 milliGRAM(s) Oral daily  atorvastatin 40 milliGRAM(s) Oral at bedtime  dextrose 5%. 1000 milliLiter(s) (50 mL/Hr) IV Continuous <Continuous>  dextrose 50% Injectable 12.5 Gram(s) IV Push once  dextrose 50% Injectable 25 Gram(s) IV Push once  dextrose 50% Injectable 25 Gram(s) IV Push once  enoxaparin Injectable 40 milliGRAM(s) SubCutaneous daily  ergocalciferol 61438 Unit(s) Oral <User Schedule>  insulin glargine Injectable (LANTUS) 20 Unit(s) SubCutaneous at bedtime  insulin lispro (HumaLOG) corrective regimen sliding scale   SubCutaneous three times a day before meals  lisinopril 10 milliGRAM(s) Oral daily  metoprolol succinate ER 25 milliGRAM(s) Oral daily    MEDICATIONS  (PRN):  dextrose 40% Gel 15 Gram(s) Oral once PRN Blood Glucose LESS THAN 70 milliGRAM(s)/deciliter  glucagon  Injectable 1 milliGRAM(s) IntraMuscular once PRN Glucose LESS THAN 70 milligrams/deciliter      Allergies    NSAIDs (Short breath)  peppers (Rash)    Intolerances        LABS:                        12.8   6.9   )-----------( 294      ( 06 Dec 2018 06:20 )             40.7     12-06    141  |  105  |  13  ----------------------------<  89  3.7   |  29  |  0.44<L>    Ca    8.7      06 Dec 2018 06:20  Phos  4.0     12-06  Mg     2.0     12-06    TPro  7.0  /  Alb  3.2<L>  /  TBili  0.4  /  DBili  x   /  AST  17  /  ALT  50  /  AlkPhos  97  12-06    PT/INR - ( 06 Dec 2018 06:20 )   PT: 11.8 sec;   INR: 1.06 ratio         PTT - ( 05 Dec 2018 17:24 )  PTT:33.4 sec      CARDIAC MARKERS ( 06 Dec 2018 06:20 )  <0.015 ng/mL / x     / 76 U/L / x     / <1.0 ng/mL  CARDIAC MARKERS ( 05 Dec 2018 23:36 )  <0.015 ng/mL / x     / 81 U/L / x     / <1.0 ng/mL  CARDIAC MARKERS ( 05 Dec 2018 17:24 )  <0.015 ng/mL / x     / x     / x     / x          CAPILLARY BLOOD GLUCOSE      POCT Blood Glucose.: 105 mg/dL (07 Dec 2018 08:07)  POCT Blood Glucose.: 204 mg/dL (06 Dec 2018 20:47)  POCT Blood Glucose.: 122 mg/dL (06 Dec 2018 16:32)  POCT Blood Glucose.: 182 mg/dL (06 Dec 2018 11:59)    pro-bnp 23 12-05 @ 17:24     d-dimer 217  12-05 @ 17:24      RADIOLOGY & ADDITIONAL TESTS:    CXR:  < from: Xray Chest 2 Views PA/Lat (12.05.18 @ 18:16) >  Comparison: None    PA and lateral chest x-rays are submitted for evaluation. There is no   acute pulmonary infiltrate, pleural effusion, or pneumothorax. The   cardiac silhouette is within normal limits. The trachea is midline. There   is no pulmonary vascular congestion .     Impression: No radiographic evidence for acute cardiopulmonary disease.    < end of copied text >    Ct scan chest:    ekg;  < from: Nuclear Stress Test-Exercise (12.06.18 @ 08:44) >  ------------------------------------------------------------------------    IMPRESSIONS:Normal Study  * Normal stress ECG at this level of exercise.  * Review of raw data shows: The study is of good technical  quality.  * The left ventricle was normal in size. Normal myocardial  perfusion scan, with no evidence of infarction or  inducible ischemia.  * Gated wall motion analysis is performed, and shows  normal wall motion with post stress LVEF of 63%.    < end of copied text >    echo: Patient is a 48y old  Female who presents with a chief complaint of Chest pain (07 Dec 2018 10:06)  Awake, alert, OOB to chair in NAD. Occasional sob and Grewal. No wheezing. for left shoulder x rays today.    INTERVAL HPI/OVERNIGHT EVENTS:      VITAL SIGNS:  T(F): 97.9 (12-07-18 @ 07:53)  HR: 81 (12-07-18 @ 07:53)  BP: 119/54 (12-07-18 @ 07:53)  RR: 16 (12-07-18 @ 07:53)  SpO2: 99% (12-07-18 @ 07:53)  Wt(kg): --  I&O's Detail          REVIEW OF SYSTEMS:    CONSTITUTIONAL:  No fevers, chills, sweats    HEENT:  Eyes:  No diplopia or blurred vision. ENT:  No earache, sore throat or runny nose.    CARDIOVASCULAR:  No pressure, squeezing, tightness, or heaviness about the chest; no palpitations.    RESPIRATORY:  Per HPI    GASTROINTESTINAL:  No abdominal pain, nausea, vomiting or diarrhea.    GENITOURINARY:  No dysuria, frequency or urgency.    NEUROLOGIC:  No paresthesias, fasciculations, seizures or weakness.    PSYCHIATRIC:  No disorder of thought or mood.      PHYSICAL EXAM:    Constitutional: Well developed and nourished  Eyes:Perrla  ENMT: normal  Neck:supple  Respiratory: good air entry  Cardiovascular: S1 S2 regular  Gastrointestinal: Soft, Non tender  Extremities: No edema  Vascular:normal  Neurological:Awake, alert,Ox3  Musculoskeletal:Normal      MEDICATIONS  (STANDING):  aspirin  chewable 81 milliGRAM(s) Oral daily  atorvastatin 40 milliGRAM(s) Oral at bedtime  dextrose 5%. 1000 milliLiter(s) (50 mL/Hr) IV Continuous <Continuous>  dextrose 50% Injectable 12.5 Gram(s) IV Push once  dextrose 50% Injectable 25 Gram(s) IV Push once  dextrose 50% Injectable 25 Gram(s) IV Push once  enoxaparin Injectable 40 milliGRAM(s) SubCutaneous daily  ergocalciferol 69726 Unit(s) Oral <User Schedule>  insulin glargine Injectable (LANTUS) 20 Unit(s) SubCutaneous at bedtime  insulin lispro (HumaLOG) corrective regimen sliding scale   SubCutaneous three times a day before meals  lisinopril 10 milliGRAM(s) Oral daily  metoprolol succinate ER 25 milliGRAM(s) Oral daily    MEDICATIONS  (PRN):  dextrose 40% Gel 15 Gram(s) Oral once PRN Blood Glucose LESS THAN 70 milliGRAM(s)/deciliter  glucagon  Injectable 1 milliGRAM(s) IntraMuscular once PRN Glucose LESS THAN 70 milligrams/deciliter      Allergies    NSAIDs (Short breath)  peppers (Rash)    Intolerances        LABS:                        12.8   6.9   )-----------( 294      ( 06 Dec 2018 06:20 )             40.7     12-06    141  |  105  |  13  ----------------------------<  89  3.7   |  29  |  0.44<L>    Ca    8.7      06 Dec 2018 06:20  Phos  4.0     12-06  Mg     2.0     12-06    TPro  7.0  /  Alb  3.2<L>  /  TBili  0.4  /  DBili  x   /  AST  17  /  ALT  50  /  AlkPhos  97  12-06    PT/INR - ( 06 Dec 2018 06:20 )   PT: 11.8 sec;   INR: 1.06 ratio         PTT - ( 05 Dec 2018 17:24 )  PTT:33.4 sec      CARDIAC MARKERS ( 06 Dec 2018 06:20 )  <0.015 ng/mL / x     / 76 U/L / x     / <1.0 ng/mL  CARDIAC MARKERS ( 05 Dec 2018 23:36 )  <0.015 ng/mL / x     / 81 U/L / x     / <1.0 ng/mL  CARDIAC MARKERS ( 05 Dec 2018 17:24 )  <0.015 ng/mL / x     / x     / x     / x          CAPILLARY BLOOD GLUCOSE      POCT Blood Glucose.: 105 mg/dL (07 Dec 2018 08:07)  POCT Blood Glucose.: 204 mg/dL (06 Dec 2018 20:47)  POCT Blood Glucose.: 122 mg/dL (06 Dec 2018 16:32)  POCT Blood Glucose.: 182 mg/dL (06 Dec 2018 11:59)    pro-bnp 23 12-05 @ 17:24     d-dimer 217  12-05 @ 17:24      RADIOLOGY & ADDITIONAL TESTS:    CXR:  < from: Xray Chest 2 Views PA/Lat (12.05.18 @ 18:16) >  Comparison: None    PA and lateral chest x-rays are submitted for evaluation. There is no   acute pulmonary infiltrate, pleural effusion, or pneumothorax. The   cardiac silhouette is within normal limits. The trachea is midline. There   is no pulmonary vascular congestion .     Impression: No radiographic evidence for acute cardiopulmonary disease.    < end of copied text >    Ct scan chest:    ekg;  < from: Nuclear Stress Test-Exercise (12.06.18 @ 08:44) >  ------------------------------------------------------------------------    IMPRESSIONS:Normal Study  * Normal stress ECG at this level of exercise.  * Review of raw data shows: The study is of good technical  quality.  * The left ventricle was normal in size. Normal myocardial  perfusion scan, with no evidence of infarction or  inducible ischemia.  * Gated wall motion analysis is performed, and shows  normal wall motion with post stress LVEF of 63%.    < end of copied text >    echo:

## 2018-12-07 NOTE — PROGRESS NOTE ADULT - SUBJECTIVE AND OBJECTIVE BOX
CHIEF COMPLAINT:Patient is a 48y old  Female who presents with a chief complaint of Chest pain.Pt having lt shoulder pain.    	  REVIEW OF SYSTEMS:  CONSTITUTIONAL: No fever, weight loss, or fatigue  EYES: No eye pain, visual disturbances, or discharge  ENT:  No difficulty hearing, tinnitus, vertigo; No sinus or throat pain  NECK: No pain or stiffness  RESPIRATORY: No cough, wheezing, chills or hemoptysis; No Shortness of Breath  CARDIOVASCULAR: No chest pain, palpitations, passing out, dizziness, or leg swelling  GASTROINTESTINAL: No abdominal or epigastric pain. No nausea, vomiting, or hematemesis; No diarrhea or constipation. No melena or hematochezia.  GENITOURINARY: No dysuria, frequency, hematuria, or incontinence  NEUROLOGICAL: No headaches, memory loss, loss of strength, numbness, or tremors  SKIN: No itching, burning, rashes, or lesions   LYMPH Nodes: No enlarged glands  ENDOCRINE: No heat or cold intolerance; No hair loss  MUSCULOSKELETAL: No joint pain or swelling; No muscle, back, +extremity pain  PSYCHIATRIC: No depression, anxiety, mood swings, or difficulty sleeping  HEME/LYMPH: No easy bruising, or bleeding gums  ALLERGY AND IMMUNOLOGIC: No hives or eczema	      PHYSICAL EXAM:  T(C): 36.6 (12-07-18 @ 07:53), Max: 37.6 (12-07-18 @ 00:08)  HR: 81 (12-07-18 @ 07:53) (78 - 92)  BP: 119/54 (12-07-18 @ 07:53) (119/54 - 153/61)  RR: 16 (12-07-18 @ 07:53) (16 - 18)  SpO2: 99% (12-07-18 @ 07:53) (96% - 99%)      Appearance: Normal	  HEENT:   Normal oral mucosa, PERRL, EOMI	  Lymphatic: No lymphadenopathy  Cardiovascular: Normal S1 S2, No JVD, No murmurs, No edema  Respiratory: Lungs clear to auscultation	  Psychiatry: A & O x 3, Mood & affect appropriate  Gastrointestinal:  Soft, Non-tender, + BS	  Skin: No rashes, No ecchymoses, No cyanosis	  Neurologic: Non-focal  Extremities: Normal range of motion, No clubbing, cyanosis or edema  Vascular: Peripheral pulses palpable 2+ bilaterally    MEDICATIONS  (STANDING):  aspirin  chewable 81 milliGRAM(s) Oral daily  atorvastatin 40 milliGRAM(s) Oral at bedtime  dextrose 5%. 1000 milliLiter(s) (50 mL/Hr) IV Continuous <Continuous>  dextrose 50% Injectable 12.5 Gram(s) IV Push once  dextrose 50% Injectable 25 Gram(s) IV Push once  dextrose 50% Injectable 25 Gram(s) IV Push once  enoxaparin Injectable 40 milliGRAM(s) SubCutaneous daily  ergocalciferol 94672 Unit(s) Oral <User Schedule>  insulin glargine Injectable (LANTUS) 20 Unit(s) SubCutaneous at bedtime  insulin lispro (HumaLOG) corrective regimen sliding scale   SubCutaneous three times a day before meals  lisinopril 10 milliGRAM(s) Oral daily  metoprolol succinate ER 25 milliGRAM(s) Oral daily  traMADol 25 milliGRAM(s) Oral once      	  LABS:	 	    CARDIAC MARKERS:  CARDIAC MARKERS ( 06 Dec 2018 06:20 )  <0.015 ng/mL / x     / 76 U/L / x     / <1.0 ng/mL  CARDIAC MARKERS ( 05 Dec 2018 23:36 )  <0.015 ng/mL / x     / 81 U/L / x     / <1.0 ng/mL  CARDIAC MARKERS ( 05 Dec 2018 17:24 )  <0.015 ng/mL / x     / x     / x     / x                              12.8   6.9   )-----------( 294      ( 06 Dec 2018 06:20 )             40.7     12-06    141  |  105  |  13  ----------------------------<  89  3.7   |  29  |  0.44<L>    Ca    8.7      06 Dec 2018 06:20  Phos  4.0     12-06  Mg     2.0     12-06    TPro  7.0  /  Alb  3.2<L>  /  TBili  0.4  /  DBili  x   /  AST  17  /  ALT  50  /  AlkPhos  97  12-06    proBNP: Serum Pro-Brain Natriuretic Peptide: 23 pg/mL (12-05 @ 17:24)    Lipid Profile: Cholesterol 212    HDL 49      HgA1c: Hemoglobin A1C, Whole Blood: 10.9 % (12-06 @ 10:00)    TSH: Thyroid Stimulating Hormone, Serum: 1.58 uU/mL (12-06 @ 06:20)  Thyroid Stimulating Hormone, Serum: 1.50 uU/mL (12-06 @ 06:20)      	  IMPRESSIONS:Normal Study  * Normal stress ECG at this level of exercise.  * Review of raw data shows: The study is of good technical  quality.  * The left ventricle was normal in size. Normal myocardial  perfusion scan, with no evidence of infarction or  inducible ischemia.  * Gated wall motion analysis is performed, and shows  normal wall motion with post stress LVEF of 63%.

## 2018-12-07 NOTE — CONSULT NOTE ADULT - SUBJECTIVE AND OBJECTIVE BOX
Pt Name: JOANNE VO  MRN: [id number]    ORTHOPEDIC CONSULT:    Orthopedic diagnosis:    HPI: Patient is a 48y Female presenting with left shoulder pain since 1 1/2 months ago. Patient states her daughter had sprained her ankle in October and since then patient had to carry daughter's bag to school. Patient states she was using left arm to do this and has been having persistent left shoulder pain. Pain is in front and side of left shoulder and is aggravated with overhead activities such as reaching for things on a shelf and lifting arms to fix her hair. She states she went to her PMD who gave her pain medication which minimally helps pain. Denies any paresthesias to upper extremities bilaterally. Denies any falls, or blunt trauma to LUE.      PAST MEDICAL & SURGICAL HISTORY:  Diabetes  Hyperlipidemia  Hypertension  Acute appendicitis      ALLERGIES: NSAIDs (Short breath)  peppers (Rash)      MEDICATIONS: aspirin  chewable 81 milliGRAM(s) Oral daily  atorvastatin 40 milliGRAM(s) Oral at bedtime  dextrose 40% Gel 15 Gram(s) Oral once PRN  dextrose 5%. 1000 milliLiter(s) IV Continuous <Continuous>  dextrose 50% Injectable 12.5 Gram(s) IV Push once  dextrose 50% Injectable 25 Gram(s) IV Push once  dextrose 50% Injectable 25 Gram(s) IV Push once  enoxaparin Injectable 40 milliGRAM(s) SubCutaneous daily  ergocalciferol 98987 Unit(s) Oral <User Schedule>  glucagon  Injectable 1 milliGRAM(s) IntraMuscular once PRN  insulin glargine Injectable (LANTUS) 20 Unit(s) SubCutaneous at bedtime  insulin lispro (HumaLOG) corrective regimen sliding scale   SubCutaneous three times a day before meals  lisinopril 10 milliGRAM(s) Oral daily  metoprolol succinate ER 25 milliGRAM(s) Oral daily      PHYSICAL EXAM:    Vital Signs Last 24 Hrs  T(C): 36.6 (07 Dec 2018 07:53), Max: 37.6 (07 Dec 2018 00:08)  T(F): 97.9 (07 Dec 2018 07:53), Max: 99.6 (07 Dec 2018 00:08)  HR: 81 (07 Dec 2018 07:53) (78 - 92)  BP: 119/54 (07 Dec 2018 07:53) (119/54 - 153/61)  BP(mean): --  RR: 16 (07 Dec 2018 07:53) (16 - 18)  SpO2: 99% (07 Dec 2018 07:53) (96% - 99%)    Left Upper Extremity: Skin intact. Tenderness to palpation on anterior aspect of left shoulder. Sensation intact. 2+ pulses. Compartments at forearm soft and NT. Forward flexion to 90 degrees. External rotation to 45 degrees. Pain with internal rotation with resistance.     LABS:                        12.8   6.9   )-----------( 294      ( 06 Dec 2018 06:20 )             40.7     12-06    141  |  105  |  13  ----------------------------<  89  3.7   |  29  |  0.44<L>    Ca    8.7      06 Dec 2018 06:20  Phos  4.0     12-06  Mg     2.0     12-06    TPro  7.0  /  Alb  3.2<L>  /  TBili  0.4  /  DBili  x   /  AST  17  /  ALT  50  /  AlkPhos  97  12-06    PT/INR - ( 06 Dec 2018 06:20 )   PT: 11.8 sec;   INR: 1.06 ratio         PTT - ( 05 Dec 2018 17:24 )  PTT:33.4 sec    RADIOLOGY:   Pending     IMPRESSION: Pt is a  48y Female with Left shoulder pain, bursitis/rotator cuff tear    PLAN:  -  Pain management  -  DVT prophylaxis  -  Daily PT  -  Xray of Left Shoulder  -  Continue with conservative management at this time  -  Follow up with Dr. Santiago within 1 week  -  Discussed with patient that if pain persists after pain medication and PT, may need outpatient MRI  -  Case discussed with Dr. Santiago

## 2018-12-07 NOTE — DISCHARGE NOTE ADULT - PLAN OF CARE
Rule out ACS You were admitted to the hospital with complaint of chest pain. Your cardiac enzymes were unremarkable and your stress test was normal. Your echocardiogram was unremarkable. We ruled out acute coronary syndrome. Your pain was likely either from gastric acid reflux or muskuloskeletal in nature as it began when you were exercising. Follow up with your PCP in 1 week. Outpatient management You informed us you have been experiencing shoulder pain. Our orthopedic team evaluated you and advised no intervention at this time. Your xray was negative for any fracture or evidence of injury. Follow up with your primary care doctor outpatient for further management. BP Control Continue your home antihypertensives. Blood glucose control Continue your home medications as prescribed and follow up with your PCP in 1 week.

## 2018-12-07 NOTE — DISCHARGE NOTE ADULT - HOSPITAL COURSE
47 y/o female with PMH of HTN, DM, HLD presented with chest pain and back pain since 1 week. According to  the pt, she was having mild chest pain  last week of about 6/10 intensity initially but increased up to 9/10 when she ran for bus, relieved by rest. Mild chest pain persisted since then and she called her PCP yesterday who recommended to come to ED. She also complained of sharp and shooting back pain since last weekradiating from lumbar vertebra to cervical spine lasting few seconds. Back pain is on and off since then. She mentioned that her friends and family noticed her face swollen. She also endorsed that she has been having  left shoulder pain since 2 months as she has been carrying heavy bags on her left hand. She mentioned that she has mild shortness of breath while walking and mild cough. orthopnea, PND and leg swelling are absent.  She deniees nausea, vomiting, blurring of vision, dizziness.    Admitted for ACS rule out. Tropx negative. EKG unremarkable. Echo normal. NST negative. ACS ruled out. Patient complained of left shoulder pain - xray normal. Ortho recommends no intervention at this time. Medically stable for discharge as symptoms are resolved.

## 2021-01-05 ENCOUNTER — APPOINTMENT (OUTPATIENT)
Dept: SURGERY | Facility: CLINIC | Age: 51
End: 2021-01-05
Payer: SELF-PAY

## 2021-01-05 PROCEDURE — 99243 OFF/OP CNSLTJ NEW/EST LOW 30: CPT

## 2021-01-06 PROBLEM — I10 ESSENTIAL (PRIMARY) HYPERTENSION: Chronic | Status: ACTIVE | Noted: 2018-12-05

## 2021-01-06 PROBLEM — E78.5 HYPERLIPIDEMIA, UNSPECIFIED: Chronic | Status: ACTIVE | Noted: 2018-12-05

## 2021-01-14 ENCOUNTER — APPOINTMENT (OUTPATIENT)
Dept: SURGERY | Facility: CLINIC | Age: 51
End: 2021-01-14

## 2021-06-25 NOTE — DISCHARGE NOTE ADULT - NSFUCAREDSC_ALL_CORE_SIUH
25 YEAR OLD FEMALE COMPLAINS OF LOWER BACK PAIN X YESTERDAY. PT DENIES ANY 
TRAUMA. PT STATES THIS IS SECOND TIME THIS IS HAPPENED, LIMITED MOBILITY DUE TO 
PAIN. PT STATES SENSATION IN LEGS INTACT. PT AOX4, BREATHING EVEN AND 
UNLABORED, SKIN WARM AND DRY. BED IN LOWEST POSITION, LOCKED, BED RAIL UPX1.



PMH - ECTOPIC PREGNANCY, FALLOPIAN CYST, HEART MURMOR

ALLERGIES - NKA No, the patient is not being discharged from [facility]

## 2021-10-14 ENCOUNTER — EMERGENCY (EMERGENCY)
Facility: HOSPITAL | Age: 51
LOS: 1 days | Discharge: HOME | End: 2021-10-14
Attending: EMERGENCY MEDICINE
Payer: MEDICAID

## 2021-10-14 VITALS
RESPIRATION RATE: 16 BRPM | TEMPERATURE: 98 F | HEIGHT: 68 IN | OXYGEN SATURATION: 98 % | DIASTOLIC BLOOD PRESSURE: 70 MMHG | WEIGHT: 179.9 LBS | HEART RATE: 100 BPM | SYSTOLIC BLOOD PRESSURE: 114 MMHG

## 2021-10-14 DIAGNOSIS — K35.80 UNSPECIFIED ACUTE APPENDICITIS: Chronic | ICD-10-CM

## 2021-10-14 PROCEDURE — 73130 X-RAY EXAM OF HAND: CPT | Mod: 26,LT

## 2021-10-14 PROCEDURE — 99283 EMERGENCY DEPT VISIT LOW MDM: CPT | Mod: 25

## 2021-10-14 PROCEDURE — 99284 EMERGENCY DEPT VISIT MOD MDM: CPT

## 2021-10-14 PROCEDURE — 73130 X-RAY EXAM OF HAND: CPT

## 2021-10-14 PROCEDURE — 82962 GLUCOSE BLOOD TEST: CPT

## 2021-10-14 NOTE — ED ADULT NURSE NOTE - NSIMPLEMENTINTERV_GEN_ALL_ED
Implemented All Fall Risk Interventions:  [place] to call system. Call bell, personal items and telephone within reach. Instruct patient to call for assistance. Room bathroom lighting operational. Non-slip footwear when patient is off stretcher. Physically safe environment: no spills, clutter or unnecessary equipment. Stretcher in lowest position, wheels locked, appropriate side rails in place. Provide visual cue, wrist band, yellow gown, etc. Monitor gait and stability. Monitor for mental status changes and reorient to person, place, and time. Review medications for side effects contributing to fall risk. Reinforce activity limits and safety measures with patient and family.

## 2021-10-14 NOTE — ED ADULT NURSE NOTE - CAS EDN DISCHARGE ASSESSMENT
Alert and oriented to person, place and time/Neuro vascular intact post splinting Alert and oriented to person, place and time

## 2021-10-14 NOTE — ED PROVIDER NOTE - CLINICAL SUMMARY MEDICAL DECISION MAKING FREE TEXT BOX
pt fell forward co of pain to left injdex finger to day.no head trauma will follow up arranged by [facility] referral coordinator. to hand surgeon splint applied

## 2021-10-14 NOTE — ED PROVIDER NOTE - OBJECTIVE STATEMENT
pt tripped and fell forward today,to break her fall she put her left hanf outward pain to left hand/bilateral knees

## 2021-10-14 NOTE — ED ADULT TRIAGE NOTE - CHIEF COMPLAINT QUOTE
c/o pain to both knees,pain to left arm,little pain to left face, denies head injury, s/p tripped and fall on the street at about 1830 today as per patient

## 2021-10-14 NOTE — ED PROVIDER NOTE - PATIENT PORTAL LINK FT
You can access the FollowMyHealth Patient Portal offered by [facility] by registering at the following website: [email or web address]. By joining [employer]’s FollowMyHealth portal, you will also be able to view your health information using other applications (apps) compatible with our system.

## 2021-10-16 ENCOUNTER — EMERGENCY (EMERGENCY)
Facility: HOSPITAL | Age: 51
LOS: 1 days | Discharge: HOME | End: 2021-10-16
Attending: EMERGENCY MEDICINE
Payer: MEDICAID

## 2021-10-16 VITALS
OXYGEN SATURATION: 98 % | DIASTOLIC BLOOD PRESSURE: 77 MMHG | HEART RATE: 95 BPM | TEMPERATURE: 98 F | RESPIRATION RATE: 16 BRPM | HEIGHT: 68 IN | SYSTOLIC BLOOD PRESSURE: 140 MMHG | WEIGHT: 181 LBS

## 2021-10-16 DIAGNOSIS — K35.80 UNSPECIFIED ACUTE APPENDICITIS: Chronic | ICD-10-CM

## 2021-10-16 PROCEDURE — 73130 X-RAY EXAM OF HAND: CPT

## 2021-10-16 PROCEDURE — 99284 EMERGENCY DEPT VISIT MOD MDM: CPT | Mod: 25

## 2021-10-16 PROCEDURE — 29130 APPL FINGER SPLINT STATIC: CPT

## 2021-10-16 PROCEDURE — 73130 X-RAY EXAM OF HAND: CPT | Mod: 26,LT

## 2021-10-16 PROCEDURE — 73090 X-RAY EXAM OF FOREARM: CPT | Mod: 26,LT

## 2021-10-16 PROCEDURE — 73090 X-RAY EXAM OF FOREARM: CPT

## 2021-10-16 PROCEDURE — 29125 APPL SHORT ARM SPLINT STATIC: CPT | Mod: LT

## 2021-10-16 RX ORDER — ACETAMINOPHEN 500 MG
975 TABLET ORAL ONCE
Refills: 0 | Status: COMPLETED | OUTPATIENT
Start: 2021-10-16 | End: 2021-10-16

## 2021-10-16 RX ADMIN — Medication 975 MILLIGRAM(S): at 11:23

## 2021-10-16 RX ADMIN — Medication 975 MILLIGRAM(S): at 12:35

## 2021-10-16 NOTE — ED ADULT NURSE NOTE - NSIMPLEMENTINTERV_GEN_ALL_ED
Implemented All Universal Safety Interventions:  [place] to call system. Call bell, personal items and telephone within reach. Instruct patient to call for assistance. Room bathroom lighting operational. Non-slip footwear when patient is off stretcher. Physically safe environment: no spills, clutter or unnecessary equipment. Stretcher in lowest position, wheels locked, appropriate side rails in place.

## 2021-10-16 NOTE — ED PROVIDER NOTE - PATIENT PORTAL LINK FT
You can access the FollowMyHealth Patient Portal offered by [facility] by registering at the following website: [email or web address]. By joining [employer]’s FollowMyHealth portal, you will also be able to view your health information using other applications (apps) compatible with our system.

## 2021-10-16 NOTE — ED PROVIDER NOTE - MDM ORDERS SUBMITTED SELECTION
Imaging Studies/Medications Inflammation Suggestive Of Cancer Camouflage Histology Text: There was a dense lymphocytic infiltrate which prevented adequate histologic evaluation of adjacent structures.

## 2021-10-16 NOTE — ED PROVIDER NOTE - NSICDXFAMILYHX_GEN_ALL_CORE_FT
FAMILY HISTORY:  Mother  Still living? Unknown  Family history of diabetes mellitus, Age at diagnosis: Age Unknown  Family history of hypertension, Age at diagnosis: Age Unknown

## 2021-10-16 NOTE — ED PROVIDER NOTE - PROGRESS NOTE DETAILS
Xray - fracture noted to the promixal end of 2nd metacarpal    Radial gutter splint applied. Patient to follow up with hand on tuesday as originally scheduled.

## 2021-10-16 NOTE — ED PROVIDER NOTE - NSFOLLOWUPINSTRUCTIONS_ED_ALL_ED_FT
Follow up with the hand specialist on tuesday as planned    Keep splint, dry. Do not get it wet.     If you experience any new or worsening symptoms or if you are concerned you can always come back to the emergency for a re-evaluation.    You can take motrin and tylenol as needed for pain.

## 2021-10-16 NOTE — ED PROVIDER NOTE - OBJECTIVE STATEMENT
50 year old female with a past history of Dm, hyperlipidemia, HTN presents to the ED with reports of left hand pain s/p fall on Thursday. Patient was evaluated here, told my MD she had a fracture, splinted and told to follow up with hand. Official read by radiologist shows no fracture. Patient is scheduled to follow up with [facility] hand specialist on tuesday but reports the pain and the swelling was so severe she couldn't take it anymore. Took advil for pain. Patient reports decreased ROM, unable to make OK sign, swelling to hand. Denies numbnesss, tingling, or decreased sensation.

## 2021-10-16 NOTE — ED PROVIDER NOTE - PHYSICAL EXAMINATION
Left hand - Radial and ulnar equally strong bilaterally 2+. Cap. Refill < 2 sec. No numbness, tingling, coldness, clicking or snapping sensation.  Pronation and supination of the forearms is WNL. No joint laxity. No snuff box tenderness. Tenderness to 2nd finger MIP and 5th finger MIP. Swelling noted to hand. Ecchymosis. Unable to make OK sign.

## 2022-05-24 ENCOUNTER — EMERGENCY (EMERGENCY)
Facility: HOSPITAL | Age: 52
LOS: 1 days | Discharge: HOME | End: 2022-05-24
Attending: EMERGENCY MEDICINE
Payer: MEDICAID

## 2022-05-24 VITALS
RESPIRATION RATE: 16 BRPM | TEMPERATURE: 98 F | HEART RATE: 99 BPM | HEIGHT: 68 IN | OXYGEN SATURATION: 100 % | WEIGHT: 169.98 LBS | DIASTOLIC BLOOD PRESSURE: 64 MMHG | SYSTOLIC BLOOD PRESSURE: 125 MMHG

## 2022-05-24 DIAGNOSIS — K35.80 UNSPECIFIED ACUTE APPENDICITIS: Chronic | ICD-10-CM

## 2022-05-24 LAB
ACETONE SERPL-MCNC: NEGATIVE — SIGNIFICANT CHANGE UP
ALBUMIN SERPL ELPH-MCNC: 4 G/DL — SIGNIFICANT CHANGE UP (ref 3.5–5)
ALP SERPL-CCNC: 53 U/L — SIGNIFICANT CHANGE UP (ref 40–120)
ALT FLD-CCNC: 20 U/L DA — SIGNIFICANT CHANGE UP (ref 10–60)
ANION GAP SERPL CALC-SCNC: 5 MMOL/L — SIGNIFICANT CHANGE UP (ref 5–17)
APPEARANCE UR: CLEAR — SIGNIFICANT CHANGE UP
AST SERPL-CCNC: 11 U/L — SIGNIFICANT CHANGE UP (ref 10–40)
BASOPHILS # BLD AUTO: 0.04 K/UL — SIGNIFICANT CHANGE UP (ref 0–0.2)
BASOPHILS NFR BLD AUTO: 0.6 % — SIGNIFICANT CHANGE UP (ref 0–2)
BILIRUB SERPL-MCNC: 0.3 MG/DL — SIGNIFICANT CHANGE UP (ref 0.2–1.2)
BILIRUB UR-MCNC: NEGATIVE — SIGNIFICANT CHANGE UP
BUN SERPL-MCNC: 26 MG/DL — HIGH (ref 7–18)
CALCIUM SERPL-MCNC: 9.7 MG/DL — SIGNIFICANT CHANGE UP (ref 8.4–10.5)
CHLORIDE SERPL-SCNC: 102 MMOL/L — SIGNIFICANT CHANGE UP (ref 96–108)
CO2 SERPL-SCNC: 32 MMOL/L — HIGH (ref 22–31)
COLOR SPEC: YELLOW — SIGNIFICANT CHANGE UP
CREAT SERPL-MCNC: 0.78 MG/DL — SIGNIFICANT CHANGE UP (ref 0.5–1.3)
DIFF PNL FLD: NEGATIVE — SIGNIFICANT CHANGE UP
EGFR: 92 ML/MIN/1.73M2 — SIGNIFICANT CHANGE UP
EOSINOPHIL # BLD AUTO: 0.07 K/UL — SIGNIFICANT CHANGE UP (ref 0–0.5)
EOSINOPHIL NFR BLD AUTO: 1.1 % — SIGNIFICANT CHANGE UP (ref 0–6)
GLUCOSE SERPL-MCNC: 121 MG/DL — HIGH (ref 70–99)
GLUCOSE UR QL: NEGATIVE — SIGNIFICANT CHANGE UP
HCG UR QL: NEGATIVE — SIGNIFICANT CHANGE UP
HCT VFR BLD CALC: 36.6 % — SIGNIFICANT CHANGE UP (ref 34.5–45)
HGB BLD-MCNC: 12 G/DL — SIGNIFICANT CHANGE UP (ref 11.5–15.5)
IMM GRANULOCYTES NFR BLD AUTO: 0.2 % — SIGNIFICANT CHANGE UP (ref 0–1.5)
KETONES UR-MCNC: NEGATIVE — SIGNIFICANT CHANGE UP
LEUKOCYTE ESTERASE UR-ACNC: ABNORMAL
LIDOCAIN IGE QN: 123 U/L — SIGNIFICANT CHANGE UP (ref 73–393)
LYMPHOCYTES # BLD AUTO: 3.22 K/UL — SIGNIFICANT CHANGE UP (ref 1–3.3)
LYMPHOCYTES # BLD AUTO: 51.2 % — HIGH (ref 13–44)
MCHC RBC-ENTMCNC: 28.4 PG — SIGNIFICANT CHANGE UP (ref 27–34)
MCHC RBC-ENTMCNC: 32.8 GM/DL — SIGNIFICANT CHANGE UP (ref 32–36)
MCV RBC AUTO: 86.7 FL — SIGNIFICANT CHANGE UP (ref 80–100)
MONOCYTES # BLD AUTO: 0.41 K/UL — SIGNIFICANT CHANGE UP (ref 0–0.9)
MONOCYTES NFR BLD AUTO: 6.5 % — SIGNIFICANT CHANGE UP (ref 2–14)
NEUTROPHILS # BLD AUTO: 2.54 K/UL — SIGNIFICANT CHANGE UP (ref 1.8–7.4)
NEUTROPHILS NFR BLD AUTO: 40.4 % — LOW (ref 43–77)
NITRITE UR-MCNC: POSITIVE
NRBC # BLD: 0 /100 WBCS — SIGNIFICANT CHANGE UP (ref 0–0)
PH UR: 5 — SIGNIFICANT CHANGE UP (ref 5–8)
PLATELET # BLD AUTO: 290 K/UL — SIGNIFICANT CHANGE UP (ref 150–400)
POTASSIUM SERPL-MCNC: 4.3 MMOL/L — SIGNIFICANT CHANGE UP (ref 3.5–5.3)
POTASSIUM SERPL-SCNC: 4.3 MMOL/L — SIGNIFICANT CHANGE UP (ref 3.5–5.3)
PROT SERPL-MCNC: 7.9 G/DL — SIGNIFICANT CHANGE UP (ref 6–8.3)
PROT UR-MCNC: 15
RBC # BLD: 4.22 M/UL — SIGNIFICANT CHANGE UP (ref 3.8–5.2)
RBC # FLD: 12.7 % — SIGNIFICANT CHANGE UP (ref 10.3–14.5)
SODIUM SERPL-SCNC: 139 MMOL/L — SIGNIFICANT CHANGE UP (ref 135–145)
SP GR SPEC: 1.02 — SIGNIFICANT CHANGE UP (ref 1.01–1.02)
TROPONIN I, HIGH SENSITIVITY RESULT: 4.5 NG/L — SIGNIFICANT CHANGE UP
UROBILINOGEN FLD QL: NEGATIVE — SIGNIFICANT CHANGE UP
WBC # BLD: 6.29 K/UL — SIGNIFICANT CHANGE UP (ref 3.8–10.5)
WBC # FLD AUTO: 6.29 K/UL — SIGNIFICANT CHANGE UP (ref 3.8–10.5)

## 2022-05-24 PROCEDURE — 83690 ASSAY OF LIPASE: CPT

## 2022-05-24 PROCEDURE — 99284 EMERGENCY DEPT VISIT MOD MDM: CPT | Mod: 25

## 2022-05-24 PROCEDURE — 82009 KETONE BODYS QUAL: CPT

## 2022-05-24 PROCEDURE — 81001 URINALYSIS AUTO W/SCOPE: CPT

## 2022-05-24 PROCEDURE — 96365 THER/PROPH/DIAG IV INF INIT: CPT

## 2022-05-24 PROCEDURE — 87186 SC STD MICRODIL/AGAR DIL: CPT

## 2022-05-24 PROCEDURE — 99284 EMERGENCY DEPT VISIT MOD MDM: CPT

## 2022-05-24 PROCEDURE — 36415 COLL VENOUS BLD VENIPUNCTURE: CPT

## 2022-05-24 PROCEDURE — 87086 URINE CULTURE/COLONY COUNT: CPT

## 2022-05-24 PROCEDURE — 80053 COMPREHEN METABOLIC PANEL: CPT

## 2022-05-24 PROCEDURE — 96375 TX/PRO/DX INJ NEW DRUG ADDON: CPT

## 2022-05-24 PROCEDURE — 81025 URINE PREGNANCY TEST: CPT

## 2022-05-24 PROCEDURE — 84484 ASSAY OF TROPONIN QUANT: CPT

## 2022-05-24 PROCEDURE — 85025 COMPLETE CBC W/AUTO DIFF WBC: CPT

## 2022-05-24 RX ORDER — METOCLOPRAMIDE HCL 10 MG
1 TABLET ORAL
Qty: 120 | Refills: 0
Start: 2022-05-24 | End: 2022-06-22

## 2022-05-24 RX ORDER — FAMOTIDINE 10 MG/ML
20 INJECTION INTRAVENOUS ONCE
Refills: 0 | Status: COMPLETED | OUTPATIENT
Start: 2022-05-24 | End: 2022-05-24

## 2022-05-24 RX ORDER — METOCLOPRAMIDE HCL 10 MG
10 TABLET ORAL ONCE
Refills: 0 | Status: COMPLETED | OUTPATIENT
Start: 2022-05-24 | End: 2022-05-24

## 2022-05-24 RX ORDER — CEPHALEXIN 500 MG
1 CAPSULE ORAL
Qty: 14 | Refills: 0
Start: 2022-05-24 | End: 2022-05-30

## 2022-05-24 RX ORDER — CEFTRIAXONE 500 MG/1
1000 INJECTION, POWDER, FOR SOLUTION INTRAMUSCULAR; INTRAVENOUS ONCE
Refills: 0 | Status: COMPLETED | OUTPATIENT
Start: 2022-05-24 | End: 2022-05-24

## 2022-05-24 RX ORDER — SODIUM CHLORIDE 9 MG/ML
1000 INJECTION INTRAMUSCULAR; INTRAVENOUS; SUBCUTANEOUS
Refills: 0 | Status: DISCONTINUED | OUTPATIENT
Start: 2022-05-24 | End: 2022-05-28

## 2022-05-24 RX ADMIN — Medication 10 MILLIGRAM(S): at 16:01

## 2022-05-24 RX ADMIN — SODIUM CHLORIDE 1000 MILLILITER(S): 9 INJECTION INTRAMUSCULAR; INTRAVENOUS; SUBCUTANEOUS at 16:01

## 2022-05-24 RX ADMIN — CEFTRIAXONE 100 MILLIGRAM(S): 500 INJECTION, POWDER, FOR SOLUTION INTRAMUSCULAR; INTRAVENOUS at 19:49

## 2022-05-24 RX ADMIN — FAMOTIDINE 20 MILLIGRAM(S): 10 INJECTION INTRAVENOUS at 16:01

## 2022-05-24 RX ADMIN — FAMOTIDINE 20 MILLIGRAM(S): 10 INJECTION INTRAVENOUS at 17:30

## 2022-05-24 RX ADMIN — CEFTRIAXONE 1000 MILLIGRAM(S): 500 INJECTION, POWDER, FOR SOLUTION INTRAMUSCULAR; INTRAVENOUS at 20:20

## 2022-05-24 NOTE — ED PROVIDER NOTE - NSFOLLOWUPINSTRUCTIONS_ED_ALL_ED_FT
[facility]                                                                                                                                                                               Pyelonephritis, Adult       Pyelonephritis is an infection that occurs in the kidney. The kidneys are the organs that filter a person's blood and move waste out of the bloodstream and into the urine. Urine passes from the kidneys, through tubes called ureters, and into the bladder. There are two main types of pyelonephritis:  •Infections that come on quickly without any warning (acute pyelonephritis).      •Infections that last for a long period of time (chronic pyelonephritis).      In most cases, the infection clears up with treatment and does not cause further problems. More severe infections or chronic infections can sometimes spread to the bloodstream or lead to other problems with the kidneys.      What are the causes?    This condition is usually caused by:  •Bacteria traveling from the bladder up to the kidney. This may occur after having a bladder infection (cystitis) or urinary tract infection (UTI).      •Bladder infections caused from bacteria traveling from the bloodstream to the kidney.        What increases the risk?    This condition is more likely to develop in:  •Pregnant women.      •Older people.    •People who have any of these conditions:  •Diabetes.      •Inflammation of the prostate gland (prostatitis), in males.      •Kidney stones or bladder stones.      •Other abnormalities of the kidney or ureter.      •Cancer.        •People who have a catheter placed in the bladder.      •People who are sexually active.      •Women who use spermicides.      •People who have had a prior UTI.        What are the signs or symptoms?    Symptoms of this condition include:  •Frequent urination.      •Strong or persistent urge to urinate.      •Burning or stinging when urinating.      •Abdominal pain.      •Back pain.      •Pain in the side or flank area.      •Fever or chills.      •Blood in the urine, or dark urine.      •Nausea or vomiting.        How is this diagnosed?    This condition may be diagnosed based on:  •Your medical history and a physical exam.      •Urine tests.      •Blood tests.      You may also have imaging tests of the kidneys, such as an ultrasound or CT scan.      How is this treated?    Treatment for this condition may depend on the severity of the infection.  •If the infection is mild and is found early, you may be treated with antibiotic medicines taken by mouth (orally). You will need to drink fluids to remain hydrated.      •If the infection is more severe, you may need to stay in the hospital and receive antibiotics given directly into a vein through an IV. You may also need to receive fluids through an IV if you are not able to remain hydrated. After your hospital stay, you may need to take oral antibiotics for a period of time.      Other treatments may be required, depending on the cause of the infection.      Follow these instructions at home:    Medicines     •Take your antibiotic medicine as told by your health care provider. Do not stop taking the antibiotic even if you start to feel better.      •Take over-the-counter and prescription medicines only as told by your health care provider.        General instructions      •Drink enough fluid to keep your urine pale yellow.      •Avoid caffeine, tea, and carbonated beverages. They tend to irritate the bladder.      •Urinate often. Avoid holding in urine for long periods of time.      •Urinate before and after sex.      •After a bowel movement, women should cleanse from front to back. Use each tissue only once.      •Keep all follow-up visits as told by your health care provider. This is important.        Contact a health care provider if:    •Your symptoms do not get better after 2 days of treatment.      •Your symptoms get worse.      •You have a fever.        Get help right away if you:    •Are unable to take your antibiotics or fluids.      •Have shaking chills.      •Vomit.      •Have severe flank or back pain.      •Have extreme weakness or fainting.        Summary    •Pyelonephritis is a urinary tract infection (UTI) that occurs in the kidney.      •Treatment for this condition may depend on the severity of the infection.      •Take your antibiotic medicine as told by your health care provider. Do not stop taking the antibiotic even if you start to feel better.      •Drink enough fluid to keep your urine pale yellow.      •Keep all follow-up visits as told by your health care provider. This is important.      This information is not intended to replace advice given to you by your health care provider. Make sure you discuss any questions you have with your health care provider.      Document Revised: 10/22/2019 Document Reviewed: 10/22/2019    [employer] Patient Education © 2022 [employer]

## 2022-05-24 NOTE — ED PROVIDER NOTE - PHYSICAL EXAMINATION
Chest/Heart: chest clear, heart regular rhythm   Abdomen: mild left upper quadrant tenderness; no guarding, no rebound, no rigidity

## 2022-05-24 NOTE — ED ADULT NURSE NOTE - NSIMPLEMENTINTERV_GEN_ALL_ED
Detail Level: Zone
Initiate Treatment: Clindamycin 150 mg twice daily x 2 wks with flares
Samples Given: Amzeeq
Discontinue Regimen: Minocycline
Continue Regimen: Wash face, chest and back twice daily with panoxyl \\n\\n\\nApply fabior to face chest and back once daily\\n\\n\\nSpray hyclodex to entire face and chest once daily in Am\\n\\nAmzeeq once daily
Implemented All Universal Safety Interventions:  [place] to call system. Call bell, personal items and telephone within reach. Instruct patient to call for assistance. Room bathroom lighting operational. Non-slip footwear when patient is off stretcher. Physically safe environment: no spills, clutter or unnecessary equipment. Stretcher in lowest position, wheels locked, appropriate side rails in place.

## 2022-05-24 NOTE — ED PROVIDER NOTE - NSICDXPASTMEDICALHX_GEN_ALL_CORE_FT
PAST MEDICAL HISTORY:  Diabetes     Hyperlipidemia     Hypertension       Acute appendicitis     Hand fracture     Type 2 diabetes mellitus

## 2022-05-24 NOTE — ED PROVIDER NOTE - CLINICAL SUMMARY MEDICAL DECISION MAKING FREE TEXT BOX
Plan: will give fluids, CBC, CMP, lipase; will hydrate, will give Reglan 10 mg IV, will give Pepcid.

## 2022-05-24 NOTE — ED PROVIDER NOTE - OBJECTIVE STATEMENT
52 y/o female complaining of pain in left upper quadrant. Has had intermittent vomiting starting on Friday and Saturday, off and on. No fever, no chills. Vital signs are normal. History of type 2 diabetes and hand fracture. Patient is allergic to NSAIDs.

## 2022-05-24 NOTE — ED PROVIDER NOTE - PATIENT PORTAL LINK FT
You can access the FollowMyHealth Patient Portal offered by [facility] by registering at the following website: [email or web address]. By joining [employer]’s FollowMyHealth portal, you will also be able to view your health information using other applications (apps) compatible with our system.

## 2022-05-24 NOTE — ED PROVIDER NOTE - NSFOLLOWUPCLINICS_GEN_ALL_ED_FT
[facility]  Internal Medicine  [address] [place] [address]  Phone: [phone]  Fax: [phone]

## 2022-11-11 NOTE — ED ADULT NURSE NOTE - NSSISCREENINGQ2_ED_A_ED
Anesthesia Pre Eval Note    Anesthesia ROS/Med Hx        Anesthetic Complication History:  Patient does not have a history of anesthetic complications      Pulmonary Review:  Patient does not have a pulmonary history      Neuro/Psych Review:  Patient does not have a neuro/psych history       Cardiovascular Review:  Exercise tolerance: good (>4 METS)  Positive for hypertension    GI/HEPATIC/RENAL Review:  Patient does not have a GI/hepatic/renalhistory       End/Other Review:    Positive for obesity class I - 30.00 - 34.99  Additional Results:     ALLERGIES:  No Known Allergies       Last Labs        Component                Value               Date/Time                  WBC                      7.2                 12/31/2021 0647            RBC                      4.15 (L)            12/31/2021 0647            HGB                      12.9 (L)            12/31/2021 0647            HCT                      37.2 (L)            12/31/2021 0647            MCV                      89.6                12/31/2021 0647            MCH                      31.1                12/31/2021 0647            MCHC                     34.7                12/31/2021 0647            RDW-CV                   13.2                12/31/2021 0647            Sodium                   136                 12/31/2021 0647            Potassium                3.6                 12/31/2021 0647            Chloride                 106                 12/31/2021 0647            Carbon Dioxide           20 (L)              12/31/2021 0647            Glucose                  91                  12/31/2021 0647            BUN                      11                  12/31/2021 0647            Creatinine               0.69                12/31/2021 0647            Glomerular Filtrati*     >90                 12/31/2021 0647            Calcium                  9.1                 12/31/2021 0647            PLT                      104 (L)              12/31/2021 0647            PTT                      29                  12/29/2021 0052            INR                      1.1                 12/29/2021 0052        Past Medical History:  No date: Anal fistula  No date: Essential (primary) hypertension    Past Surgical History:  No date: Appendectomy       Prior to Admission medications :  Medication metoPROLOL succinate (TOPROL-XL) 25 MG 24 hr tablet, Sig Take 25 mg by mouth nightly., Start Date , End Date , Taking? Yes, Authorizing Provider Outside Provider    Medication docusate sodium (Colace) 100 MG capsule, Sig Take 1 capsule by mouth 2 times daily., Start Date 11/11/22, End Date , Taking? , Authorizing Provider Kumar Lyons MD    Medication traMADol (ULTRAM) 50 MG tablet, Sig Take 1 tablet by mouth every 6 hours as needed for Pain., Start Date 11/11/22, End Date , Taking? , Authorizing Provider Kumar Lyons MD    Medication ketorolac (TORADOL) 10 MG tablet, Sig Take 1 tablet by mouth every 6 hours as needed for Pain., Start Date 11/11/22, End Date , Taking? , Authorizing Provider Kumar Lyons MD    Medication pantoprazole (PROTONIX) 40 MG tablet, Sig TAKE 1 TABLET BY MOUTH ONCE DAILY BEFORE MEAL(S) FOR 30 DAYS, Start Date 9/18/22, End Date , Taking? , Authorizing Provider Outside Provider    Medication vitamin B-12 (CYANOCOBALAMIN) 1000 MCG tablet, Sig Take 1,000 mcg by mouth daily. Indications: Inadequate Vitamin B12, Start Date , End Date , Taking? , Authorizing Provider Outside Provider    Medication ALPRAZolam (XANAX) 1 MG tablet, Sig Take 1 mg by mouth nightly as needed for Sleep., Start Date , End Date , Taking? , Authorizing Provider Outside Provider         Patient Vitals in the past 24 hrs:  11/11/22 1124, BP:(!) 129/96, Temp:36.3 °C (97.3 °F), Temp src:Temporal, Pulse:70, Resp:18, SpO2:98 %, Height:5' 7.72\" (1.72 m), Weight:93.1 kg (205 lb 4 oz)      Relevant Problems   No relevant active problems       Physical Exam     Airway    Mallampati: II  TM Distance: >3 FB  Neck ROM: Full  Neck: Non-tender and Able to place in sniff position  TMJ Mobility: Good    Cardiovascular  Cardiovascular exam normal  Cardio Rhythm: Regular  Cardio Rate: Normal    Head Assessment  Head assessment: Normocephalic and Atraumatic    General Assessment  General Assessment: Alert and oriented and No acute distress    Dental Exam      Legend: C=Chipped  M=Missing  L=Loose B=Bridge Cr=[place] I=Implant    Pulmonary Exam  Pulmonary exam normal  Breath sounds clear to auscultation:  Yes    Abdominal Exam    Patient Demonstrates:  Obese      Anesthesia Plan:    ASA Status: 2  Anesthesia Type: General    Induction: Intravenous  Preferred Airway Type: ETT  Maintenance: Inhalational  Premedication: IV      Post-op Pain Management: Per Surgeon      Checklist  Reviewed: Lab Results, EKG, Patient Summary, Beta Blocker Status, NPO Status, Allergies, Medications, Problem list and Past Med History  Consent/Risks Discussed Statement:  The proposed anesthetic plan, including its risks and benefits, have been discussed with the Patient along with the risks and benefits of alternatives. Questions were encouraged and answered and the patient and/or representative understands and agrees to proceed.        I discussed with the patient (and/or patient's legal representative) the risks and benefits of the proposed anesthesia plan, General, which may include services performed by other anesthesia providers.    Alternative anesthesia plans, if available, were reviewed with the patient (and/or patient's legal representative). Discussion has been held with the patient (and/or patient's legal representative) regarding risks of anesthesia, which include Nausea, Vomiting, Sore Throat and Dental Injury and emergent situations that may require change in anesthesia plan.    The patient (and/or patient's legal representative) has indicated understanding, his/her questions have been answered, and  he/she wishes to proceed with the planned anesthetic.    Blood Products: Not Anticipated    Patient seen and evaluated.  Agree with above plan.   No

## 2023-08-10 ENCOUNTER — EMERGENCY (EMERGENCY)
Facility: HOSPITAL | Age: 53
LOS: 1 days | Discharge: HOME | End: 2023-08-10
Attending: EMERGENCY MEDICINE
Payer: MEDICAID

## 2023-08-10 VITALS
HEART RATE: 111 BPM | WEIGHT: 169.98 LBS | OXYGEN SATURATION: 97 % | RESPIRATION RATE: 18 BRPM | TEMPERATURE: 98 F | SYSTOLIC BLOOD PRESSURE: 181 MMHG | HEIGHT: 68 IN | DIASTOLIC BLOOD PRESSURE: 95 MMHG

## 2023-08-10 VITALS
TEMPERATURE: 98 F | RESPIRATION RATE: 18 BRPM | HEART RATE: 90 BPM | DIASTOLIC BLOOD PRESSURE: 71 MMHG | OXYGEN SATURATION: 97 % | SYSTOLIC BLOOD PRESSURE: 128 MMHG

## 2023-08-10 DIAGNOSIS — K35.80 UNSPECIFIED ACUTE APPENDICITIS: Chronic | ICD-10-CM

## 2023-08-10 PROCEDURE — 70450 CT HEAD/BRAIN W/O DYE: CPT | Mod: 26,MG

## 2023-08-10 PROCEDURE — G1004: CPT

## 2023-08-10 PROCEDURE — 70450 CT HEAD/BRAIN W/O DYE: CPT | Mod: MG

## 2023-08-10 PROCEDURE — 99284 EMERGENCY DEPT VISIT MOD MDM: CPT | Mod: 25

## 2023-08-10 PROCEDURE — 99284 EMERGENCY DEPT VISIT MOD MDM: CPT
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
## 2023-08-10 NOTE — ED PROVIDER NOTE - NSFOLLOWUPINSTRUCTIONS_ED_ALL_ED_FT
please follow up with your doctor   1) recommned MRI of LLe   2) recommend evaluation for peripheral neuropathy
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
## 2023-08-10 NOTE — ED PROVIDER NOTE - NSICDXPASTMEDICALHX_GEN_ALL_CORE_FT
PAST MEDICAL HISTORY:  Acute appendicitis     Diabetes     Hand fracture     Hyperlipidemia     Hypertension     Type 2 diabetes mellitus
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
## 2023-08-10 NOTE — ED ADULT NURSE NOTE - PAIN: DESCRIPTION (FREQUENCY/QUALITY)
"[facility] Progress Note    HPI:  RTC for 3 month follow up of fulguration of condyloma.       S: Continued itching and burning of anus, some bleeding noted after hard bowel movements. He is currently not placing anything on his anus.     O:   Vitals:  BP (!) 160/92   Pulse 95   Temp 97.5  F (36.4  C)   Ht 1.854 m (6' 1\")   Wt 113.8 kg (250 lb 12.8 oz)   SpO2 95%   BMI 33.09 kg/m        Physical Exam:  G: alert oriented, no acute distress   ENT: sclera non-icteric   Pulm: no respiratory distress   CVS: RRR  ABD: circumferential whitish plaque, worse at the anterior midline   Ext: WWP     Assessment/Plan:    66 y.o. male with history of both heart and kidney transplant who presented back in 8/2019 for colonoscopy, biopsies taken at that were consistent with condyloma, no dysplasia noted, took back to OR in September for fulguration of condyloma combined with hemorrhoidectomy. Biopsies taken again He has persistent symptoms and appears to have more extensive recurrence on exam. I believe he only had anterior burden of disease now appears more circumferential.   I discussed at this point I would like the opinion of a colon and rectal surgeon prior to any further fulguration as frequent bouts of general anesthesia come with its own set of risks with his transplants.     Cristofer Ruiz MD     "
intermittent

## 2023-08-10 NOTE — ED PROVIDER NOTE - CARE PROVIDER_API CALL
Dominic Cee (MD)  Clinical Neurophysiology; Neurology; Sleep Medicine  [address] [facility], [address] [place] [address]  Phone: [phone]  Fax: [phone]  Follow Up Time:

## 2023-08-10 NOTE — ED PROVIDER NOTE - PATIENT PORTAL LINK FT
You can access the FollowMyHealth Patient Portal offered by [facility] by registering at the following website: [email or web address]. By joining [facility]’s FollowMyHealth portal, you will also be able to view your health information using other applications (apps) compatible with our system.

## 2023-08-11 PROBLEM — E11.9 TYPE 2 DIABETES MELLITUS WITHOUT COMPLICATIONS: Chronic | Status: ACTIVE | Noted: 2022-05-24

## 2023-08-11 PROBLEM — S62.90XA UNSPECIFIED FRACTURE OF UNSPECIFIED WRIST AND HAND, INITIAL ENCOUNTER FOR CLOSED FRACTURE: Chronic | Status: ACTIVE | Noted: 2022-05-24

## 2023-08-11 PROBLEM — K35.80 UNSPECIFIED ACUTE APPENDICITIS: Chronic | Status: ACTIVE | Noted: 2022-05-24

## 2023-09-19 ENCOUNTER — INPATIENT (INPATIENT)
Facility: HOSPITAL | Age: 53
LOS: 6 days | Discharge: HOME | DRG: 65 | End: 2023-09-26
Attending: INTERNAL MEDICINE | Admitting: INTERNAL MEDICINE
Payer: MEDICAID

## 2023-09-19 VITALS
HEIGHT: 68 IN | HEART RATE: 104 BPM | RESPIRATION RATE: 17 BRPM | WEIGHT: 175.49 LBS | SYSTOLIC BLOOD PRESSURE: 117 MMHG | TEMPERATURE: 99 F | OXYGEN SATURATION: 96 % | DIASTOLIC BLOOD PRESSURE: 68 MMHG

## 2023-09-19 DIAGNOSIS — I63.9 CEREBRAL INFARCTION, UNSPECIFIED: ICD-10-CM

## 2023-09-19 DIAGNOSIS — K35.80 UNSPECIFIED ACUTE APPENDICITIS: Chronic | ICD-10-CM

## 2023-09-19 LAB
ALBUMIN SERPL ELPH-MCNC: 2.7 G/DL — LOW (ref 3.5–5)
ALP SERPL-CCNC: 106 U/L — SIGNIFICANT CHANGE UP (ref 40–120)
ALT FLD-CCNC: 35 U/L DA — SIGNIFICANT CHANGE UP (ref 10–60)
ANION GAP SERPL CALC-SCNC: 10 MMOL/L — SIGNIFICANT CHANGE UP (ref 5–17)
APTT BLD: 35.4 SEC — SIGNIFICANT CHANGE UP (ref 24.5–35.6)
AST SERPL-CCNC: 21 U/L — SIGNIFICANT CHANGE UP (ref 10–40)
BASOPHILS # BLD AUTO: 0.06 K/UL — SIGNIFICANT CHANGE UP (ref 0–0.2)
BASOPHILS NFR BLD AUTO: 0.7 % — SIGNIFICANT CHANGE UP (ref 0–2)
BILIRUB SERPL-MCNC: 0.2 MG/DL — SIGNIFICANT CHANGE UP (ref 0.2–1.2)
BUN SERPL-MCNC: 25 MG/DL — HIGH (ref 7–18)
CALCIUM SERPL-MCNC: 9.3 MG/DL — SIGNIFICANT CHANGE UP (ref 8.4–10.5)
CHLORIDE SERPL-SCNC: 96 MMOL/L — SIGNIFICANT CHANGE UP (ref 96–108)
CK SERPL-CCNC: 33 U/L — SIGNIFICANT CHANGE UP (ref 21–215)
CO2 SERPL-SCNC: 29 MMOL/L — SIGNIFICANT CHANGE UP (ref 22–31)
CREAT SERPL-MCNC: 0.92 MG/DL — SIGNIFICANT CHANGE UP (ref 0.5–1.3)
EGFR: 75 ML/MIN/1.73M2 — SIGNIFICANT CHANGE UP
EOSINOPHIL # BLD AUTO: 0.07 K/UL — SIGNIFICANT CHANGE UP (ref 0–0.5)
EOSINOPHIL NFR BLD AUTO: 0.8 % — SIGNIFICANT CHANGE UP (ref 0–6)
ERYTHROCYTE [SEDIMENTATION RATE] IN BLOOD: 102 MM/HR — HIGH (ref 0–20)
GLUCOSE SERPL-MCNC: 283 MG/DL — HIGH (ref 70–99)
HCG SERPL-ACNC: 2 MIU/ML — SIGNIFICANT CHANGE UP
HCT VFR BLD CALC: 35.6 % — SIGNIFICANT CHANGE UP (ref 34.5–45)
HGB BLD-MCNC: 11.4 G/DL — LOW (ref 11.5–15.5)
IMM GRANULOCYTES NFR BLD AUTO: 0.3 % — SIGNIFICANT CHANGE UP (ref 0–0.9)
INR BLD: 0.96 RATIO — SIGNIFICANT CHANGE UP (ref 0.85–1.18)
LIDOCAIN IGE QN: 25 U/L — SIGNIFICANT CHANGE UP (ref 13–75)
LYMPHOCYTES # BLD AUTO: 2.32 K/UL — SIGNIFICANT CHANGE UP (ref 1–3.3)
LYMPHOCYTES # BLD AUTO: 26.9 % — SIGNIFICANT CHANGE UP (ref 13–44)
MAGNESIUM SERPL-MCNC: 1.8 MG/DL — SIGNIFICANT CHANGE UP (ref 1.6–2.6)
MCHC RBC-ENTMCNC: 28.4 PG — SIGNIFICANT CHANGE UP (ref 27–34)
MCHC RBC-ENTMCNC: 32 GM/DL — SIGNIFICANT CHANGE UP (ref 32–36)
MCV RBC AUTO: 88.6 FL — SIGNIFICANT CHANGE UP (ref 80–100)
MONOCYTES # BLD AUTO: 0.71 K/UL — SIGNIFICANT CHANGE UP (ref 0–0.9)
MONOCYTES NFR BLD AUTO: 8.2 % — SIGNIFICANT CHANGE UP (ref 2–14)
NEUTROPHILS # BLD AUTO: 5.44 K/UL — SIGNIFICANT CHANGE UP (ref 1.8–7.4)
NEUTROPHILS NFR BLD AUTO: 63.1 % — SIGNIFICANT CHANGE UP (ref 43–77)
NRBC # BLD: 0 /100 WBCS — SIGNIFICANT CHANGE UP (ref 0–0)
PLATELET # BLD AUTO: 387 K/UL — SIGNIFICANT CHANGE UP (ref 150–400)
POTASSIUM SERPL-MCNC: 4.4 MMOL/L — SIGNIFICANT CHANGE UP (ref 3.5–5.3)
POTASSIUM SERPL-SCNC: 4.4 MMOL/L — SIGNIFICANT CHANGE UP (ref 3.5–5.3)
PROT SERPL-MCNC: 7.7 G/DL — SIGNIFICANT CHANGE UP (ref 6–8.3)
PROTHROM AB SERPL-ACNC: 11 SEC — SIGNIFICANT CHANGE UP (ref 9.5–13)
RBC # BLD: 4.02 M/UL — SIGNIFICANT CHANGE UP (ref 3.8–5.2)
RBC # FLD: 11.9 % — SIGNIFICANT CHANGE UP (ref 10.3–14.5)
SODIUM SERPL-SCNC: 135 MMOL/L — SIGNIFICANT CHANGE UP (ref 135–145)
TROPONIN I, HIGH SENSITIVITY RESULT: 3.4 NG/L — SIGNIFICANT CHANGE UP
WBC # BLD: 8.63 K/UL — SIGNIFICANT CHANGE UP (ref 3.8–10.5)
WBC # FLD AUTO: 8.63 K/UL — SIGNIFICANT CHANGE UP (ref 3.8–10.5)

## 2023-09-19 PROCEDURE — 71045 X-RAY EXAM CHEST 1 VIEW: CPT | Mod: 26

## 2023-09-19 PROCEDURE — 99285 EMERGENCY DEPT VISIT HI MDM: CPT

## 2023-09-19 PROCEDURE — 70450 CT HEAD/BRAIN W/O DYE: CPT | Mod: 26,MA

## 2023-09-19 NOTE — ED PROVIDER NOTE - PROGRESS NOTE DETAILS
Labs/CT explained to pt  Pt with elevated ESR, tachycardia, subacute brain infarct.  case d/w Dr. Otto, will admit.  Worrisome for autoimmune diseases

## 2023-09-19 NOTE — ED PROVIDER NOTE - NEUROLOGICAL, MLM
Alert and oriented, no focal deficits, no motor or sensory deficits.  Romberg-neg, pt with mild ataxia

## 2023-09-19 NOTE — ED PROVIDER NOTE - CLINICAL SUMMARY MEDICAL DECISION MAKING FREE TEXT BOX
Patient is MRI showed subacute infarct since August.  Patient with expressive asphasia, also ataxia, will get labs, ESR, CT head, reassess  Pt's not TNK candidate or endovascular candidate, onset >1 mo ago

## 2023-09-19 NOTE — ED PROVIDER NOTE - CARE PLAN
1 Principal Discharge DX:	Cerebral infarct  Secondary Diagnosis:	Elevated sedimentation rate  Secondary Diagnosis:	Tachycardia

## 2023-09-19 NOTE — ED ADULT TRIAGE NOTE - ACCOMPANIED BY
Immediate family member Area H Indication Text: Tumors in this location are included in Area H (eyelids, eyebrows, nose, lips, chin, ear, pre-auricular, post-auricular, temple, genitalia, hands, feet, ankles and areola).  Tissue conservation is critical in these anatomic locations.
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
## 2023-09-20 DIAGNOSIS — E11.9 TYPE 2 DIABETES MELLITUS WITHOUT COMPLICATIONS: ICD-10-CM

## 2023-09-20 DIAGNOSIS — Z29.9 ENCOUNTER FOR PROPHYLACTIC MEASURES, UNSPECIFIED: ICD-10-CM

## 2023-09-20 DIAGNOSIS — I10 ESSENTIAL (PRIMARY) HYPERTENSION: ICD-10-CM

## 2023-09-20 DIAGNOSIS — I63.9 CEREBRAL INFARCTION, UNSPECIFIED: ICD-10-CM

## 2023-09-20 DIAGNOSIS — E78.5 HYPERLIPIDEMIA, UNSPECIFIED: ICD-10-CM

## 2023-09-20 DIAGNOSIS — R70.0 ELEVATED ERYTHROCYTE SEDIMENTATION RATE: ICD-10-CM

## 2023-09-20 DIAGNOSIS — R00.0 TACHYCARDIA, UNSPECIFIED: ICD-10-CM

## 2023-09-20 LAB
ANION GAP SERPL CALC-SCNC: 9 MMOL/L — SIGNIFICANT CHANGE UP (ref 5–17)
B PERT DNA SPEC QL NAA+PROBE: SIGNIFICANT CHANGE UP
BUN SERPL-MCNC: 14 MG/DL — SIGNIFICANT CHANGE UP (ref 7–18)
C PNEUM DNA SPEC QL NAA+PROBE: SIGNIFICANT CHANGE UP
CALCIUM SERPL-MCNC: 9.5 MG/DL — SIGNIFICANT CHANGE UP (ref 8.4–10.5)
CHLORIDE SERPL-SCNC: 97 MMOL/L — SIGNIFICANT CHANGE UP (ref 96–108)
CHOLEST SERPL-MCNC: 214 MG/DL — HIGH
CO2 SERPL-SCNC: 31 MMOL/L — SIGNIFICANT CHANGE UP (ref 22–31)
CREAT SERPL-MCNC: 0.65 MG/DL — SIGNIFICANT CHANGE UP (ref 0.5–1.3)
EGFR: 106 ML/MIN/1.73M2 — SIGNIFICANT CHANGE UP
FLUAV H1 2009 PAND RNA SPEC QL NAA+PROBE: SIGNIFICANT CHANGE UP
FLUAV H1 RNA SPEC QL NAA+PROBE: SIGNIFICANT CHANGE UP
FLUAV H3 RNA SPEC QL NAA+PROBE: SIGNIFICANT CHANGE UP
FLUAV SUBTYP SPEC NAA+PROBE: SIGNIFICANT CHANGE UP
FLUBV RNA SPEC QL NAA+PROBE: SIGNIFICANT CHANGE UP
GLUCOSE BLDC GLUCOMTR-MCNC: 211 MG/DL — HIGH (ref 70–99)
GLUCOSE BLDC GLUCOMTR-MCNC: 221 MG/DL — HIGH (ref 70–99)
GLUCOSE BLDC GLUCOMTR-MCNC: 221 MG/DL — HIGH (ref 70–99)
GLUCOSE BLDC GLUCOMTR-MCNC: 285 MG/DL — HIGH (ref 70–99)
GLUCOSE SERPL-MCNC: 213 MG/DL — HIGH (ref 70–99)
HADV DNA SPEC QL NAA+PROBE: SIGNIFICANT CHANGE UP
HCOV PNL SPEC NAA+PROBE: SIGNIFICANT CHANGE UP
HCT VFR BLD CALC: 36.7 % — SIGNIFICANT CHANGE UP (ref 34.5–45)
HDLC SERPL-MCNC: 36 MG/DL — LOW
HGB BLD-MCNC: 11.9 G/DL — SIGNIFICANT CHANGE UP (ref 11.5–15.5)
HMPV RNA SPEC QL NAA+PROBE: SIGNIFICANT CHANGE UP
HPIV1 RNA SPEC QL NAA+PROBE: SIGNIFICANT CHANGE UP
HPIV2 RNA SPEC QL NAA+PROBE: SIGNIFICANT CHANGE UP
HPIV3 RNA SPEC QL NAA+PROBE: SIGNIFICANT CHANGE UP
HPIV4 RNA SPEC QL NAA+PROBE: SIGNIFICANT CHANGE UP
LIPID PNL WITH DIRECT LDL SERPL: 153 MG/DL — HIGH
MAGNESIUM SERPL-MCNC: 1.7 MG/DL — SIGNIFICANT CHANGE UP (ref 1.6–2.6)
MCHC RBC-ENTMCNC: 28.5 PG — SIGNIFICANT CHANGE UP (ref 27–34)
MCHC RBC-ENTMCNC: 32.4 GM/DL — SIGNIFICANT CHANGE UP (ref 32–36)
MCV RBC AUTO: 88 FL — SIGNIFICANT CHANGE UP (ref 80–100)
NON HDL CHOLESTEROL: 178 MG/DL — HIGH
NRBC # BLD: 0 /100 WBCS — SIGNIFICANT CHANGE UP (ref 0–0)
PHOSPHATE SERPL-MCNC: 2.7 MG/DL — SIGNIFICANT CHANGE UP (ref 2.5–4.5)
PLATELET # BLD AUTO: 396 K/UL — SIGNIFICANT CHANGE UP (ref 150–400)
POTASSIUM SERPL-MCNC: 3.9 MMOL/L — SIGNIFICANT CHANGE UP (ref 3.5–5.3)
POTASSIUM SERPL-SCNC: 3.9 MMOL/L — SIGNIFICANT CHANGE UP (ref 3.5–5.3)
RAPID RVP RESULT: SIGNIFICANT CHANGE UP
RBC # BLD: 4.17 M/UL — SIGNIFICANT CHANGE UP (ref 3.8–5.2)
RBC # FLD: 12 % — SIGNIFICANT CHANGE UP (ref 10.3–14.5)
RSV RNA SPEC QL NAA+PROBE: SIGNIFICANT CHANGE UP
RV+EV RNA SPEC QL NAA+PROBE: SIGNIFICANT CHANGE UP
SARS-COV-2 RNA SPEC QL NAA+PROBE: SIGNIFICANT CHANGE UP
SODIUM SERPL-SCNC: 137 MMOL/L — SIGNIFICANT CHANGE UP (ref 135–145)
TRIGL SERPL-MCNC: 124 MG/DL — SIGNIFICANT CHANGE UP
WBC # BLD: 7.09 K/UL — SIGNIFICANT CHANGE UP (ref 3.8–10.5)
WBC # FLD AUTO: 7.09 K/UL — SIGNIFICANT CHANGE UP (ref 3.8–10.5)

## 2023-09-20 PROCEDURE — 99223 1ST HOSP IP/OBS HIGH 75: CPT

## 2023-09-20 RX ORDER — ONDANSETRON 8 MG/1
4 TABLET, FILM COATED ORAL EVERY 8 HOURS
Refills: 0 | Status: DISCONTINUED | OUTPATIENT
Start: 2023-09-20 | End: 2023-09-26

## 2023-09-20 RX ORDER — ACETAMINOPHEN 500 MG
650 TABLET ORAL EVERY 6 HOURS
Refills: 0 | Status: DISCONTINUED | OUTPATIENT
Start: 2023-09-20 | End: 2023-09-26

## 2023-09-20 RX ORDER — ATORVASTATIN CALCIUM 80 MG/1
20 TABLET, FILM COATED ORAL AT BEDTIME
Refills: 0 | Status: DISCONTINUED | OUTPATIENT
Start: 2023-09-20 | End: 2023-09-20

## 2023-09-20 RX ORDER — PANTOPRAZOLE SODIUM 20 MG/1
40 TABLET, DELAYED RELEASE ORAL
Refills: 0 | Status: DISCONTINUED | OUTPATIENT
Start: 2023-09-20 | End: 2023-09-26

## 2023-09-20 RX ORDER — ATORVASTATIN CALCIUM 80 MG/1
80 TABLET, FILM COATED ORAL AT BEDTIME
Refills: 0 | Status: DISCONTINUED | OUTPATIENT
Start: 2023-09-20 | End: 2023-09-26

## 2023-09-20 RX ORDER — LISINOPRIL 2.5 MG/1
1 TABLET ORAL
Qty: 0 | Refills: 0 | DISCHARGE

## 2023-09-20 RX ORDER — INSULIN DEGLUDEC 100 U/ML
55 INJECTION, SOLUTION SUBCUTANEOUS
Qty: 0 | Refills: 0 | DISCHARGE

## 2023-09-20 RX ORDER — LANOLIN ALCOHOL/MO/W.PET/CERES
3 CREAM (GRAM) TOPICAL AT BEDTIME
Refills: 0 | Status: DISCONTINUED | OUTPATIENT
Start: 2023-09-20 | End: 2023-09-26

## 2023-09-20 RX ORDER — ASPIRIN/CALCIUM CARB/MAGNESIUM 324 MG
81 TABLET ORAL DAILY
Refills: 0 | Status: DISCONTINUED | OUTPATIENT
Start: 2023-09-20 | End: 2023-09-26

## 2023-09-20 RX ORDER — LOSARTAN POTASSIUM 100 MG/1
50 TABLET, FILM COATED ORAL DAILY
Refills: 0 | Status: DISCONTINUED | OUTPATIENT
Start: 2023-09-20 | End: 2023-09-22

## 2023-09-20 RX ORDER — ATORVASTATIN CALCIUM 80 MG/1
1 TABLET, FILM COATED ORAL
Qty: 0 | Refills: 0 | DISCHARGE

## 2023-09-20 RX ORDER — LINAGLIPTIN AND METFORMIN HYDROCHLORIDE 2.5; 85 MG/1; MG/1
1 TABLET, FILM COATED ORAL
Qty: 0 | Refills: 0 | DISCHARGE

## 2023-09-20 RX ORDER — INSULIN LISPRO 100/ML
VIAL (ML) SUBCUTANEOUS
Refills: 0 | Status: DISCONTINUED | OUTPATIENT
Start: 2023-09-20 | End: 2023-09-26

## 2023-09-20 RX ORDER — INSULIN GLARGINE 100 [IU]/ML
15 INJECTION, SOLUTION SUBCUTANEOUS AT BEDTIME
Refills: 0 | Status: DISCONTINUED | OUTPATIENT
Start: 2023-09-20 | End: 2023-09-24

## 2023-09-20 RX ORDER — ENOXAPARIN SODIUM 100 MG/ML
40 INJECTION SUBCUTANEOUS EVERY 24 HOURS
Refills: 0 | Status: DISCONTINUED | OUTPATIENT
Start: 2023-09-20 | End: 2023-09-26

## 2023-09-20 RX ADMIN — Medication 2: at 22:15

## 2023-09-20 RX ADMIN — ATORVASTATIN CALCIUM 80 MILLIGRAM(S): 80 TABLET, FILM COATED ORAL at 22:15

## 2023-09-20 RX ADMIN — LOSARTAN POTASSIUM 50 MILLIGRAM(S): 100 TABLET, FILM COATED ORAL at 05:57

## 2023-09-20 RX ADMIN — ENOXAPARIN SODIUM 40 MILLIGRAM(S): 100 INJECTION SUBCUTANEOUS at 19:55

## 2023-09-20 RX ADMIN — Medication 1: at 09:28

## 2023-09-20 RX ADMIN — Medication 2: at 12:57

## 2023-09-20 RX ADMIN — Medication 3: at 18:21

## 2023-09-20 RX ADMIN — PANTOPRAZOLE SODIUM 40 MILLIGRAM(S): 20 TABLET, DELAYED RELEASE ORAL at 09:28

## 2023-09-20 RX ADMIN — INSULIN GLARGINE 15 UNIT(S): 100 INJECTION, SOLUTION SUBCUTANEOUS at 22:52

## 2023-09-20 RX ADMIN — Medication 81 MILLIGRAM(S): at 13:18

## 2023-09-20 NOTE — PATIENT PROFILE ADULT - FALL HARM RISK - HARM RISK INTERVENTIONS

## 2023-09-20 NOTE — H&P ADULT - PROBLEM SELECTOR PLAN 6
On glipizide, Ozempic, Jardiance, pioglitazone, and Novolog 70/30 40U BID.   Hold outpatient medications.   Start On glipizide, Ozempic, Jardiance, pioglitazone, and Novolog 70/30 40U BID.   Hold outpatient medications.   Start lantus 15U and SSI.

## 2023-09-20 NOTE — H&P ADULT - NSHPSOCIALHISTORY_GEN_ALL_CORE
Patient is a former smoker.   Patient reports social alcohol use.   Patient denies using illicit drugs.
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
## 2023-09-20 NOTE — H&P ADULT - NSHPPHYSICALEXAM_GEN_ALL_CORE
PHYSICAL EXAM:  GENERAL: NAD, speaks in full sentences, no signs of respiratory distress  HEAD:  Atraumatic, Normocephalic  EYES: EOMI, PERRLA, conjunctiva and sclera clear  NECK: Supple  CHEST/LUNG: Clear to auscultation bilaterally; No wheeze; No crackles; No accessory muscles used  HEART: Regular rate and rhythm; No murmurs;   ABDOMEN: Soft, Nontender, Nondistended; Bowel sounds present; No guarding  EXTREMITIES:  2+ Peripheral Pulses, No edema  PSYCH: AAOx3  NERVOUS SYSTEM:  Alert & Oriented X3,  Cranial nerves:  CN II: Visual fields are full to confrontation. Pupils are 3-4 mm and briskly reactive to light.   CN III, IV, VI: intact EOM  CN V: Facial sensation is intact  CN VII: Face is symmetric with normal eye closure and smile.  CN VIII: Hearing is normal  CN IX, X: Palate elevates symmetrically. Phonation is normal.  CN XI: Head turning and shoulder shrug are intact  CN XII: Tongue is midline with normal movements and no atrophy.  Motor: 5/5 strength in all upper extremities. 4/5 strength in left LE.   Sensory: no loss of sensation on rest of extremities  Reflexes: 2/2 bicep, patellar  Coordination:  Rapid alternating movements and fine finger movements are intact. There is no dysmetria on finger-to-nose and heel-knee-shin. There are no abnormal or extraneous movements. Romberg is absent.  SKIN: No rashes or lesions

## 2023-09-20 NOTE — H&P ADULT - PROBLEM SELECTOR PLAN 2
EKG: Sinus tachycardia.   Admit to tele.   Consult cardiology.
Yes - the patient is able to be screened

## 2023-09-20 NOTE — H&P ADULT - PROBLEM SELECTOR PLAN 1
P/w intermittent worsening of slurred speech, left sided weakness, and dizziness present for 3 weeks.   CT head: No acute intracranial process. No large arterial distribution acute infarct. No acute intracranial hemorrhage.  Outpatient MR head: Mild reduced diffusion in the juan measuring approximately 1.5cmx1.3cm with mild faint enhancement likely reflecting an early subacute infarct at this point. Mild hemorrhagic conversion along the right jessica-juan. Small areas of reduced diffusion along the bilateral brachium pontis (middle cerebellar peduncles) also likely reflecting subacute lacunar infarcts at this point. No evidence of acute herniation.  EKG: Sinus tachycardia.   Passed dysphagia screen.   Start aspirin and high dose statin.   F/U A1c and lipid profile.   Admit to tele.   F/U echo.   Consulted neurology -   Consulted cardiology - P/w intermittent worsening of slurred speech, left sided weakness, and dizziness present for 3 weeks.   CT head: No acute intracranial process. No large arterial distribution acute infarct. No acute intracranial hemorrhage.  Outpatient MR head: Mild reduced diffusion in the juan measuring approximately 1.5cmx1.3cm with mild faint enhancement likely reflecting an early subacute infarct at this point. Mild hemorrhagic conversion along the right jessica-juan. Small areas of reduced diffusion along the bilateral brachium pontis (middle cerebellar peduncles) also likely reflecting subacute lacunar infarcts at this point. No evidence of acute herniation.  EKG: Sinus tachycardia.   Passed dysphagia screen.   Start aspirin and high dose statin.   F/U A1c and lipid profile.   Admit to tele.   F/U echo.   Consulted neurology - Dr. Sanchez  Consulted cardiology - Dr. Sheikh

## 2023-09-20 NOTE — H&P ADULT - NSHPPOADEEPVENOUSTHROMB_GEN_A_CORE
Problem: Ineffective Coping  Goal: Participates in unit activities  Description: Interventions:  - Provide therapeutic environment   - Provide required programming   - Redirect inappropriate behaviors   Outcome: Progressing     Problem: SELF HARM/SUICIDALITY  Goal: Will have no self-injury during hospital stay  Description: INTERVENTIONS:  - Q 15 MINUTES: Routine safety checks  - Q WAKING SHIFT & PRN: Assess risk to determine if routine checks are adequate to maintain patient safety  - Encourage patient to participate actively in care by formulating a plan to combat response to suicidal ideation, identify supports and resources  Outcome: Progressing     Problem: ANXIETY  Goal: Will report anxiety at manageable levels  Description: INTERVENTIONS:  - Administer medication as ordered  - Teach and encourage coping skills  - Provide emotional support  - Assess patient/family for anxiety and ability to cope  Outcome: Progressing  Goal: By discharge: Patient will verbalize 2 strategies to deal with anxiety  Description: Interventions:  - Identify any obvious source/trigger to anxiety  - Staff will assist patient in applying identified coping technique/skills  - Encourage attendance of scheduled groups and activities  Outcome: Progressing     Problem: SUBSTANCE USE/ABUSE  Goal: Will have no detox symptoms and will verbalize plan for changing substance-related behavior  Description: INTERVENTIONS:  - Monitor physical status and assess for symptoms of withdrawal  - Administer medication as ordered  - Provide emotional support with 1 on 1 interaction with staff  - Encourage recovery focused program/ addiction education  - Assess for verbalization of changing behaviors related to substance abuse  - Initiate consults and referrals as appropriate (Case Management, Spiritual Care, etc.)  Outcome: Progressing  Goal: By discharge, will develop insight into their chemical dependency and sustain motivation to continue in recovery  Description: INTERVENTIONS:  - Attends all daily group sessions and scheduled AA groups  - Actively practices coping skills through participation in the therapeutic community and adherence to program rules  - Reviews and completes assignments from individual treatment plan  - Assist patient development of understanding of their personal cycle of addiction and relapse triggers  Outcome: Progressing  Goal: By discharge, patient will have ongoing treatment plan addressing chemical dependency  Description: INTERVENTIONS:  - Assist patient with resources and/or appointments for ongoing recovery based living  Outcome: Progressing no

## 2023-09-20 NOTE — CONSULT NOTE ADULT - ASSESSMENT
51 y/o F who ambulates with a walker (only for past 3 weeks) with PMH of DM, HTN, HLD who presented with worsening of left sided weakness, slurred speech, and dizziness that has been present for 3 weeks.  1.Neurology eval.  2.DM-Insulin, check A1c.  3.HTN-cont bp medication.  4.Lipid d/o-statin.  5.Check TFT's.  6.Echocardiogram.  7.GI and DVT prophylaxis.
Impression:  Left sided weakness, dizziness and slurred speech concerning for CVA     Recommendations:  1.             Admit to telemetry for 24hours to evaluate for arrythmias/ Afib.  2.             MRI brain, MRA head without contrast, Carotid duplex (CD).  If unable to get MR imaging, please consider CTA head and neck in 24hours (no need for CD in this case).  If the patient is unable to get MR and unable to get IV contrast please repeat the CTH in 24hours and get a CD.  Role of the imaging is to evaluate for stroke and evaluate vasculature for artherosclerosis/ thrombi which may have lead to a stroke.  3.             TTE  4.             Please check HbA1C and fasting lipid profile  5.             Secondary stroke prevention: ASA 81mg (or ASA 325mg rectally if unable to take po) and Lipitor 40mg HS; Plavix x 3 weeks.  If the patient is on anticoagulation, there is no neurological need for ASA or Plavix.  6.              PT evaluation  7.          STAT CTH IF the patient has sudden change in mental status or neurological exam  8.          DVT PPx    Thank you for the courtesy of this consult.

## 2023-09-20 NOTE — H&P ADULT - NSICDXPASTMEDICALHX_GEN_ALL_CORE_FT
PAST MEDICAL HISTORY:  Acute appendicitis     Hand fracture     Hyperlipidemia     Hypertension     Type 2 diabetes mellitus

## 2023-09-21 LAB
A1C WITH ESTIMATED AVERAGE GLUCOSE RESULT: >15.5 % — HIGH (ref 4–5.6)
ESTIMATED AVERAGE GLUCOSE: >398 MG/DL — HIGH (ref 68–114)
GLUCOSE BLDC GLUCOMTR-MCNC: 165 MG/DL — HIGH (ref 70–99)
GLUCOSE BLDC GLUCOMTR-MCNC: 233 MG/DL — HIGH (ref 70–99)
GLUCOSE BLDC GLUCOMTR-MCNC: 256 MG/DL — HIGH (ref 70–99)
GLUCOSE BLDC GLUCOMTR-MCNC: 262 MG/DL — HIGH (ref 70–99)
T4 FREE SERPL-MCNC: 1.5 NG/DL — SIGNIFICANT CHANGE UP (ref 0.9–1.8)
TSH SERPL-MCNC: 0.59 UU/ML — SIGNIFICANT CHANGE UP (ref 0.34–4.82)
VIT B12 SERPL-MCNC: >2000 PG/ML — HIGH (ref 232–1245)

## 2023-09-21 PROCEDURE — 70544 MR ANGIOGRAPHY HEAD W/O DYE: CPT | Mod: 26,59

## 2023-09-21 PROCEDURE — 70551 MRI BRAIN STEM W/O DYE: CPT | Mod: 26

## 2023-09-21 PROCEDURE — 99232 SBSQ HOSP IP/OBS MODERATE 35: CPT

## 2023-09-21 RX ORDER — CLOPIDOGREL BISULFATE 75 MG/1
75 TABLET, FILM COATED ORAL DAILY
Refills: 0 | Status: DISCONTINUED | OUTPATIENT
Start: 2023-09-21 | End: 2023-09-26

## 2023-09-21 RX ORDER — INSULIN LISPRO 100/ML
3 VIAL (ML) SUBCUTANEOUS
Refills: 0 | Status: DISCONTINUED | OUTPATIENT
Start: 2023-09-21 | End: 2023-09-24

## 2023-09-21 RX ADMIN — Medication 1: at 08:14

## 2023-09-21 RX ADMIN — Medication 650 MILLIGRAM(S): at 08:17

## 2023-09-21 RX ADMIN — ENOXAPARIN SODIUM 40 MILLIGRAM(S): 100 INJECTION SUBCUTANEOUS at 20:47

## 2023-09-21 RX ADMIN — ATORVASTATIN CALCIUM 80 MILLIGRAM(S): 80 TABLET, FILM COATED ORAL at 21:47

## 2023-09-21 RX ADMIN — INSULIN GLARGINE 15 UNIT(S): 100 INJECTION, SOLUTION SUBCUTANEOUS at 21:47

## 2023-09-21 RX ADMIN — Medication 2: at 16:58

## 2023-09-21 RX ADMIN — LOSARTAN POTASSIUM 50 MILLIGRAM(S): 100 TABLET, FILM COATED ORAL at 06:38

## 2023-09-21 RX ADMIN — Medication 3 UNIT(S): at 16:57

## 2023-09-21 RX ADMIN — Medication 3: at 21:47

## 2023-09-21 RX ADMIN — PANTOPRAZOLE SODIUM 40 MILLIGRAM(S): 20 TABLET, DELAYED RELEASE ORAL at 06:37

## 2023-09-21 RX ADMIN — Medication 650 MILLIGRAM(S): at 10:33

## 2023-09-21 NOTE — PROGRESS NOTE ADULT - PROBLEM SELECTOR PLAN 6
H/o DM on Glipizide, Ozempic, Jardiance, Pioglitazone, and Novolog 70/30 40U BID.   - FS trending up.  Added premeal coverage.  Continue to monitor and adjust insulin accordingly.  - C/w Lantus, HSS and Premeal

## 2023-09-21 NOTE — PROGRESS NOTE ADULT - ASSESSMENT
Left sided weakness, dizziness and slurred speech concerning for CVA     Recommendations:  1.             Admit to telemetry for 24hours to evaluate for arrythmias/ Afib.  2.             MRI brain, MRA head without contrast, Carotid duplex (CD).  If unable to get MR imaging, please consider CTA head and neck in 24hours (no need for CD in this case).  If the patient is unable to get MR and unable to get IV contrast please repeat the CTH in 24hours and get a CD.  Role of the imaging is to evaluate for stroke and evaluate vasculature for artherosclerosis/ thrombi which may have lead to a stroke.  3.             TTE  4.             Please check HbA1C and fasting lipid profile  5.             Secondary stroke prevention: ASA 81mg (or ASA 325mg rectally if unable to take po) and Lipitor 40mg HS; Plavix x 3 weeks.  If the patient is on anticoagulation, there is no neurological need for ASA or Plavix.  6.              PT evaluation  7.          STAT CTH IF the patient has sudden change in mental status or neurological exam  8.          DVT PPx Left sided weakness, dizziness and slurred speech concerning for CVA     Recommendations:  1.             Admit to telemetry for 24hours to evaluate for arrythmias/ Afib.  2.             MRI brain, MRA head without contrast, Carotid duplex (CD).  If unable to get MR imaging, please consider CTA head and neck in 24hours (no need for CD in this case).  If the patient is unable to get MR and unable to get IV contrast please repeat the CTH in 24hours and get a CD.  Role of the imaging is to evaluate for stroke and evaluate vasculature for artherosclerosis/ thrombi which may have lead to a stroke.  3.             Secondary stroke prevention: ASA 81mg (or ASA 325mg rectally if unable to take po) and Lipitor 40mg HS; Plavix x 3 weeks.  If the patient is on anticoagulation, there is no neurological need for ASA or Plavix.  4.              PT evaluation  5.          STAT CTH IF the patient has sudden change in mental status or neurological exam  6.          DVT PPx

## 2023-09-21 NOTE — PROGRESS NOTE ADULT - PROBLEM SELECTOR PLAN 3
- P/w Elevated OAI=030, unknown etiology - P/w Elevated QLR=222, unknown etiology  - ESR in AM-f/u results

## 2023-09-21 NOTE — PROGRESS NOTE ADULT - SUBJECTIVE AND OBJECTIVE BOX
NP Note discussed with  primary attending    Patient is a 52y old  Female who presents with a chief complaint of CVA (21 Sep 2023 11:53)      INTERVAL HPI/OVERNIGHT EVENTS: Pt reports to feel achy and reports she asked the nurse for Tylenol.  Denies spinning sensation, SOB, CP or abdominal pain.  Denies new symptoms or concerns.      MEDICATIONS  (STANDING):  aspirin  chewable 81 milliGRAM(s) Oral daily  atorvastatin 80 milliGRAM(s) Oral at bedtime  clopidogrel Tablet 75 milliGRAM(s) Oral daily  enoxaparin Injectable 40 milliGRAM(s) SubCutaneous every 24 hours  hydrochlorothiazide 12.5 milliGRAM(s) Oral daily  insulin glargine Injectable (LANTUS) 15 Unit(s) SubCutaneous at bedtime  insulin lispro (ADMELOG) corrective regimen sliding scale   SubCutaneous Before meals and at bedtime  insulin lispro Injectable (ADMELOG) 3 Unit(s) SubCutaneous three times a day before meals  losartan 50 milliGRAM(s) Oral daily  pantoprazole    Tablet 40 milliGRAM(s) Oral before breakfast    MEDICATIONS  (PRN):  acetaminophen     Tablet .. 650 milliGRAM(s) Oral every 6 hours PRN Temp greater or equal to 38C (100.4F), Mild Pain (1 - 3)  melatonin 3 milliGRAM(s) Oral at bedtime PRN Insomnia  ondansetron Injectable 4 milliGRAM(s) IV Push every 8 hours PRN Nausea and/or Vomiting      __________________________________________________  REVIEW OF SYSTEMS:    CONSTITUTIONAL: No fever  EYES: No acute visual disturbances  NECK: No pain or stiffness  RESPIRATORY: No cough; No shortness of breath  CARDIOVASCULAR: No chest pain, no palpitations  GASTROINTESTINAL: No pain. No nausea or vomiting.  No diarrhea   NEUROLOGICAL: No headache or numbness, no tremors  MUSCULOSKELETAL: No joint pain, no muscle pain  GENITOURINARY: No dysuria, no frequency, no hesitancy  PSYCHIATRY: No depression , no anxiety  ALL OTHER  ROS negative        Vital Signs Last 24 Hrs  T(C): 36.8 (21 Sep 2023 11:08), Max: 37.7 (20 Sep 2023 16:05)  T(F): 98.2 (21 Sep 2023 11:08), Max: 99.9 (20 Sep 2023 16:05)  HR: 86 (21 Sep 2023 11:08) (86 - 101)  BP: 122/62 (21 Sep 2023 11:08) (109/59 - 150/67)  RR: 16 (21 Sep 2023 11:08) (16 - 20)  SpO2: 92% (21 Sep 2023 11:08) (92% - 94%)    Parameters below as of 21 Sep 2023 11:08  Patient On (Oxygen Delivery Method): room air        ________________________________________________  PHYSICAL EXAM:  GENERAL: NAD  HEENT: Normocephalic;  conjunctivae and sclerae clear; moist mucous membranes.  (+) Mild left facial as   NECK : Supple  CHEST/LUNG: Clear to auscultation bilaterally with good air entry   HEART: S1 S2  regular; no murmurs, gallops or rubs  ABDOMEN: Soft, Nontender, Nondistended; Bowel sounds present x 4 quad  EXTREMITIES: No cyanosis; no edema; no calf tenderness  SKIN: Warm and dry; no rash  NERVOUS SYSTEM:  Awake and alert; Oriented to place, person and time; no new deficits    _________________________________________________  LABS:                        11.9   7.09  )-----------( 396      ( 20 Sep 2023 10:15 )             36.7     09-20    137  |  97  |  14  ----------------------------<  213<H>  3.9   |  31  |  0.65    Ca    9.5      20 Sep 2023 10:15  Phos  2.7     09-20  Mg     1.7     09-20    TPro  7.7  /  Alb  2.7<L>  /  TBili  0.2  /  DBili  x   /  AST  21  /  ALT  35  /  AlkPhos  106  09-19    PT/INR - ( 19 Sep 2023 19:40 )   PT: 11.0 sec;   INR: 0.96 ratio         PTT - ( 19 Sep 2023 19:40 )  PTT:35.4 sec  Urinalysis Basic - ( 20 Sep 2023 10:15 )    Color: x / Appearance: x / SG: x / pH: x  Gluc: 213 mg/dL / Ketone: x  / Bili: x / Urobili: x   Blood: x / Protein: x / Nitrite: x   Leuk Esterase: x / RBC: x / WBC x   Sq Epi: x / Non Sq Epi: x / Bacteria: x      CAPILLARY BLOOD GLUCOSE      POCT Blood Glucose.: 256 mg/dL (21 Sep 2023 12:14)  POCT Blood Glucose.: 165 mg/dL (21 Sep 2023 07:58)  POCT Blood Glucose.: 211 mg/dL (20 Sep 2023 22:49)  POCT Blood Glucose.: 221 mg/dL (20 Sep 2023 21:20)  POCT Blood Glucose.: 285 mg/dL (20 Sep 2023 17:19)        RADIOLOGY & ADDITIONAL TESTS:    Imaging Personally Reviewed:  YES/NO    Consultant(s) Notes Reviewed:   YES/ No    Care Discussed with Consultants :     Plan of care was discussed with patient and /or primary care giver; all questions and concerns were addressed and care was aligned with patient's wishes.     NP Note discussed with  primary attending    Patient is a 52y old  Female who presents with a chief complaint of CVA (21 Sep 2023 11:53)      INTERVAL HPI/OVERNIGHT EVENTS: Pt reports to feel achy and reports she asked the nurse for Tylenol.  Denies spinning sensation, SOB, CP or abdominal pain.  Denies new symptoms or concerns.      MEDICATIONS  (STANDING):  aspirin  chewable 81 milliGRAM(s) Oral daily  atorvastatin 80 milliGRAM(s) Oral at bedtime  clopidogrel Tablet 75 milliGRAM(s) Oral daily  enoxaparin Injectable 40 milliGRAM(s) SubCutaneous every 24 hours  hydrochlorothiazide 12.5 milliGRAM(s) Oral daily  insulin glargine Injectable (LANTUS) 15 Unit(s) SubCutaneous at bedtime  insulin lispro (ADMELOG) corrective regimen sliding scale   SubCutaneous Before meals and at bedtime  insulin lispro Injectable (ADMELOG) 3 Unit(s) SubCutaneous three times a day before meals  losartan 50 milliGRAM(s) Oral daily  pantoprazole    Tablet 40 milliGRAM(s) Oral before breakfast    MEDICATIONS  (PRN):  acetaminophen     Tablet .. 650 milliGRAM(s) Oral every 6 hours PRN Temp greater or equal to 38C (100.4F), Mild Pain (1 - 3)  melatonin 3 milliGRAM(s) Oral at bedtime PRN Insomnia  ondansetron Injectable 4 milliGRAM(s) IV Push every 8 hours PRN Nausea and/or Vomiting      __________________________________________________  REVIEW OF SYSTEMS:    CONSTITUTIONAL: No fever  EYES: No acute visual disturbances  NECK: No pain or stiffness  RESPIRATORY: No cough; No shortness of breath  CARDIOVASCULAR: No chest pain, no palpitations  GASTROINTESTINAL: No pain. No nausea or vomiting.  No diarrhea   NEUROLOGICAL: No headache or numbness, no tremors  MUSCULOSKELETAL: No joint pain, no muscle pain  GENITOURINARY: No dysuria, no frequency, no hesitancy  PSYCHIATRY: No depression , no anxiety  ALL OTHER  ROS negative        Vital Signs Last 24 Hrs  T(C): 36.8 (21 Sep 2023 11:08), Max: 37.7 (20 Sep 2023 16:05)  T(F): 98.2 (21 Sep 2023 11:08), Max: 99.9 (20 Sep 2023 16:05)  HR: 86 (21 Sep 2023 11:08) (86 - 101)  BP: 122/62 (21 Sep 2023 11:08) (109/59 - 150/67)  RR: 16 (21 Sep 2023 11:08) (16 - 20)  SpO2: 92% (21 Sep 2023 11:08) (92% - 94%)    Parameters below as of 21 Sep 2023 11:08  Patient On (Oxygen Delivery Method): room air        ________________________________________________  PHYSICAL EXAM:  GENERAL: NAD  HEENT: Normocephalic;  conjunctivae and sclerae clear; moist mucous membranes.  (+) Mild left facial as   NECK : Supple  CHEST/LUNG: Clear to auscultation bilaterally with good air entry   HEART: S1 S2  regular; no murmurs, gallops or rubs  ABDOMEN: Soft, Nontender, Nondistended; Bowel sounds present x 4 quad  EXTREMITIES: No cyanosis; no edema; no calf tenderness  SKIN: Warm and dry; no rash  NERVOUS SYSTEM:  Awake and alert; Oriented to place, person and time; no new deficits    _________________________________________________  LABS:                        11.9   7.09  )-----------( 396      ( 20 Sep 2023 10:15 )             36.7     09-20    137  |  97  |  14  ----------------------------<  213<H>  3.9   |  31  |  0.65    Ca    9.5      20 Sep 2023 10:15  Phos  2.7     09-20  Mg     1.7     09-20    TPro  7.7  /  Alb  2.7<L>  /  TBili  0.2  /  DBili  x   /  AST  21  /  ALT  35  /  AlkPhos  106  09-19    PT/INR - ( 19 Sep 2023 19:40 )   PT: 11.0 sec;   INR: 0.96 ratio         PTT - ( 19 Sep 2023 19:40 )  PTT:35.4 sec  Urinalysis Basic - ( 20 Sep 2023 10:15 )    Color: x / Appearance: x / SG: x / pH: x  Gluc: 213 mg/dL / Ketone: x  / Bili: x / Urobili: x   Blood: x / Protein: x / Nitrite: x   Leuk Esterase: x / RBC: x / WBC x   Sq Epi: x / Non Sq Epi: x / Bacteria: x      CAPILLARY BLOOD GLUCOSE      POCT Blood Glucose.: 256 mg/dL (21 Sep 2023 12:14)  POCT Blood Glucose.: 165 mg/dL (21 Sep 2023 07:58)  POCT Blood Glucose.: 211 mg/dL (20 Sep 2023 22:49)  POCT Blood Glucose.: 221 mg/dL (20 Sep 2023 21:20)  POCT Blood Glucose.: 285 mg/dL (20 Sep 2023 17:19)        RADIOLOGY & ADDITIONAL TESTS:  < from: MR Angio Head No Cont (09.21.23 @ 12:21) >  ACC: [id number] EXAM:  MR ANGIO BRAIN   ORDERED BY: SUKHWINDER ALFORD     PROCEDURE DATE:  09/21/2023          INTERPRETATION:  INDICATION:  Stroke. TIA.    TECHNIQUE:  MR angiography of the brain was performed using three   dimensional time-of-flight (3D-TOF) technique.    FINDINGS:    INTERNAL CAROTID ARTERIES:  Bilaterally patent.  No intraluminal filling   defect or dissection seen.    [place] [facility] [place]:  No aneurysm identified.    CEREBRAL ARTERIES:    Anterior cerebral arteries: There is tortuosity of the anterior cerebral   arteries proximally, with no focal stenosis or occlusion identified.    Middle cerebral arteries: Both middle cerebral arteries are patent. No M1   lesion or distal branch occlusion is suggested.    Posterior cerebral arteries: Both posterior cerebral arteries are patent.    VERTEBROBASILAR SYSTEM:  Vertebrobasilar system is patent.    IMPRESSION:  Unremarkable MR angiographic study of the brain. No obvious vascular   malformation noted in the posterior fossa. However CTA imaging of the   head and neck may be considered for further assessment.    --- End of Report ---            JESSICA GRUBBS MD; Attending Radiologist  This document has been electronically signed. Sep 21 2023  2:20PM    < end of copied text >  < from: MR Head No Cont (09.21.23 @ 12:21) >  ACC: [id number] EXAM:  MR BRAIN   ORDERED BY: SUKHWINDER ALFORD     PROCEDURE DATE:  09/21/2023          INTERPRETATION:  INDICATION:    Stroke. TIA.  TECHNIQUE:  Multiplanar brain imaging was conducted. T1, T2 and FLAIR   imaging was performed.  In addition, diffusion imaging, diffusion   coefficient assessment (ADC) and T2* was incorporated . No contrast was   administered.  COMPARISON EXAMINATION:  CT 9/19/2023.    FINDINGS:    HEMISPHERES: There are scattered small white matter lesions in both   hemispheres. No diffusion restriction or acute ischemia is noted in   either hemisphere.  VENTRICLES:  midline and normal in size  POSTERIOR FOSSA:  There is a diffusion abnormality in the right   paracentral juan indicative of an acute infarct. However also noted is   T2*shortening suggesting a component of hemorrhage. A cavernoma in this   region cannot be totally excluded as well. Further correlation and   follow-up recommended. Cerebellum is unremarkable.  EXTRA-AXIAL:  No mass or collectionsare depicted.  VASCULATURE:  There is an incidental venous angioma in the right the   parietal region. A cavernoma and venous angioma in the upper juan cannot   be totally excluded on the right.  SINUSES AND MASTOIDS:  Clear.  MISCELLANEOUS:  No orbital or pituitary abnormality noted.  No skull base   lesion suggested.    IMPRESSION:    1)  An acute infarct is noted in the central juan, in conjunction with a   component of T2*shortening/hemorrhage. However a cavernoma in this region   cannot be excluded. Further workup and assessment recommended.  2)  nonspecific scattered small white matter lesions both hemispheres,   with no acute supratentorial infarct or hemorrhage.    --- End of Report ---            JESSICA GRUBBS MD; Attending Radiologist  This document has been electronically signed. Sep 21 2023  2:17PM    < end of copied text >      Imaging Personally Reviewed:  YES/NO    Consultant(s) Notes Reviewed:   YES/ No    Care Discussed with Consultants :     Plan of care was discussed with patient and /or primary care giver; all questions and concerns were addressed and care was aligned with patient's wishes.

## 2023-09-21 NOTE — PROGRESS NOTE ADULT - ASSESSMENT
52 year old, Female, who ambulates with a walker (only for past 3 weeks), from home, with PMH of DM, HTN, & HLD.  Presented with worsening of left sided weakness, slurred speech, and dizziness x 3 weeks. On outpatient MR head patient was found to have a subacute infarct in Sandra, and middle cerebellar peduncles.   Admitted evaluation and  CVA workup.      52 year old, Female, who ambulates with a walker (only for past 3 weeks), from home, with PMH of DM, HTN, & HLD.  Presented with worsening of left sided weakness, slurred speech, and dizziness x 3 weeks. On outpatient MR head patient was found to have a subacute infarct in Sandra, and middle cerebellar peduncles.   Admitted for Acute Stroke.

## 2023-09-21 NOTE — PROGRESS NOTE ADULT - SUBJECTIVE AND OBJECTIVE BOX
Patient is a 52y old  Female who presents with a chief complaint of CVA (20 Sep 2023 12:42)    pt seen in icu [  ], reg med floor [   ], bed [  ], chair at bedside [   ], a+o x3 [  ], lethargic [  ],  nad [  ]    boyd [  ], ngt [  ], peg [  ], et tube [  ], cent line [  ], picc line [  ]        Allergies    peppers (Rash)  NSAIDs (Short breath)        Vitals    T(F): 98.2 (09-21-23 @ 04:25), Max: 99.9 (09-20-23 @ 16:05)  HR: 88 (09-21-23 @ 04:25) (88 - 101)  BP: 119/55 (09-21-23 @ 04:25) (109/59 - 150/67)  RR: 18 (09-21-23 @ 04:25) (17 - 20)  SpO2: 94% (09-21-23 @ 04:25) (92% - 96%)  Wt(kg): --  CAPILLARY BLOOD GLUCOSE      POCT Blood Glucose.: 211 mg/dL (20 Sep 2023 22:49)      Labs                          11.9   7.09  )-----------( 396      ( 20 Sep 2023 10:15 )             36.7       09-20    137  |  97  |  14  ----------------------------<  213<H>  3.9   |  31  |  0.65    Ca    9.5      20 Sep 2023 10:15  Phos  2.7     09-20  Mg     1.7     09-20    TPro  7.7  /  Alb  2.7<L>  /  TBili  0.2  /  DBili  x   /  AST  21  /  ALT  35  /  AlkPhos  106  09-19      CARDIAC MARKERS ( 19 Sep 2023 19:40 )  x     / x     / 33 U/L / x     / x          Troponin I, High Sensitivity Result: 3.4 ng/L (09-19-23 @ 19:40)        Radiology Results      Meds    MEDICATIONS  (STANDING):  aspirin  chewable 81 milliGRAM(s) Oral daily  atorvastatin 80 milliGRAM(s) Oral at bedtime  enoxaparin Injectable 40 milliGRAM(s) SubCutaneous every 24 hours  hydrochlorothiazide 12.5 milliGRAM(s) Oral daily  insulin glargine Injectable (LANTUS) 15 Unit(s) SubCutaneous at bedtime  insulin lispro (ADMELOG) corrective regimen sliding scale   SubCutaneous Before meals and at bedtime  losartan 50 milliGRAM(s) Oral daily  pantoprazole    Tablet 40 milliGRAM(s) Oral before breakfast      MEDICATIONS  (PRN):  acetaminophen     Tablet .. 650 milliGRAM(s) Oral every 6 hours PRN Temp greater or equal to 38C (100.4F), Mild Pain (1 - 3)  melatonin 3 milliGRAM(s) Oral at bedtime PRN Insomnia  ondansetron Injectable 4 milliGRAM(s) IV Push every 8 hours PRN Nausea and/or Vomiting      Physical Exam    Neuro :  no focal deficits  Respiratory: CTA B/L  CV: RRR, S1S2, no murmurs,   Abdominal: Soft, NT, ND +BS,  Extremities: No edema, + peripheral pulses    ASSESSMENT    r/o acute cns patho,   r/o evolving cva,   r/o arrythmia,   h/o early subacute infarct at this point. Mild hemorrhagic conversion along the right jessica-juan, subacute lacunar infarcts,    DM,   HTN,   HLD    Cerebral infarction    Hypertension    Hyperlipidemia    Diabetes    Acute appendicitis    Hand fracture    Type 2 diabetes mellitus    Acute appendicitis        PLAN    adm to tele,   aspirin,   statin,   hold outpt oral dm meds,   lispro ss,   lantus 15 units qhs,   cont preadmit home meds,   gi and dvt prophylaxis  cbc,   bmp,   mg,   phos,   lipid,   tsh,   ce q8 x3,   hgba1c    echo      cardio cons  neuro cons.      Patient is a 52y old  Female who presents with a chief complaint of CVA (20 Sep 2023 12:42)    pt seen in tele [x], reg med floor [   ], bed [ x ], chair at bedside [   ], a+o x3 [ x], lethargic [  ],  nad [x ]      Allergies    peppers (Rash)  NSAIDs (Short breath)        Vitals    T(F): 98.2 (09-21-23 @ 04:25), Max: 99.9 (09-20-23 @ 16:05)  HR: 88 (09-21-23 @ 04:25) (88 - 101)  BP: 119/55 (09-21-23 @ 04:25) (109/59 - 150/67)  RR: 18 (09-21-23 @ 04:25) (17 - 20)  SpO2: 94% (09-21-23 @ 04:25) (92% - 96%)  Wt(kg): --  CAPILLARY BLOOD GLUCOSE      POCT Blood Glucose.: 211 mg/dL (20 Sep 2023 22:49)      Labs                          11.9   7.09  )-----------( 396      ( 20 Sep 2023 10:15 )             36.7       09-20    137  |  97  |  14  ----------------------------<  213<H>  3.9   |  31  |  0.65    Ca    9.5      20 Sep 2023 10:15  Phos  2.7     09-20  Mg     1.7     09-20    TPro  7.7  /  Alb  2.7<L>  /  TBili  0.2  /  DBili  x   /  AST  21  /  ALT  35  /  AlkPhos  106  09-19    A1C with Estimated Average Glucose (09.20.23 @ 10:15)   A1C with Estimated Average Glucose Result: >15.5:    CARDIAC MARKERS ( 19 Sep 2023 19:40 )  x     / x     / 33 U/L / x     / x          Troponin I, High Sensitivity Result: 3.4 ng/L (09-19-23 @ 19:40)        Radiology Results      Meds    MEDICATIONS  (STANDING):  aspirin  chewable 81 milliGRAM(s) Oral daily  atorvastatin 80 milliGRAM(s) Oral at bedtime  enoxaparin Injectable 40 milliGRAM(s) SubCutaneous every 24 hours  hydrochlorothiazide 12.5 milliGRAM(s) Oral daily  insulin glargine Injectable (LANTUS) 15 Unit(s) SubCutaneous at bedtime  insulin lispro (ADMELOG) corrective regimen sliding scale   SubCutaneous Before meals and at bedtime  losartan 50 milliGRAM(s) Oral daily  pantoprazole    Tablet 40 milliGRAM(s) Oral before breakfast      MEDICATIONS  (PRN):  acetaminophen     Tablet .. 650 milliGRAM(s) Oral every 6 hours PRN Temp greater or equal to 38C (100.4F), Mild Pain (1 - 3)  melatonin 3 milliGRAM(s) Oral at bedtime PRN Insomnia  ondansetron Injectable 4 milliGRAM(s) IV Push every 8 hours PRN Nausea and/or Vomiting      Physical Exam    Neuro :  no focal deficits  Respiratory: CTA B/L  CV: RRR, S1S2, no murmurs,   Abdominal: Soft, NT, ND +BS,  Extremities: No edema, + peripheral pulses    ASSESSMENT    r/o acute cns patho,   r/o evolving cva,   r/o arrythmia,   uncontrolled dm  h/o early subacute infarct at this point. Mild hemorrhagic conversion along the right jessica-juan, subacute lacunar infarcts,    DM,   HTN,   HLD      PLAN    cont tele,   cont aspirin, statin,   neuro f/u   Admit to telemetry for 24hours to evaluate for arrythmias/ Afib.  f/u MRI brain, MRA head without contrast, Carotid duplex (CD).    If unable to get MR imaging, please consider CTA head and neck in 24hours (no need for CD in this case).    If the patient is unable to get MR and unable to get IV contrast please repeat the CTH in 24hours and get a CD.    Role of the imaging is to evaluate for stroke and evaluate vasculature for arthrosclerosis/ thrombi which may have lead to a stroke.  Secondary stroke prevention: ASA 81mg (or ASA 325mg rectally if unable to take po) and Lipitor 40mg HS; Plavix x 3 weeks.  If the patient is on anticoagulation, there is no neurological need for ASA or Plavix.  STAT CTH IF the patient has sudden change in mental status or neurological exa  trop x1 neg noted above   cardio f/u   f/u echo   hold outpt oral dm meds,   hgba1c 15.5 noted above  lispro ss,   lantus 15 units qhs,   endo cons   phys tx eval   cont current meds

## 2023-09-21 NOTE — PROGRESS NOTE ADULT - PROBLEM SELECTOR PLAN 1
- P/w Intermittent worsening of slurred speech, left sided weakness, and dizziness present x 3 weeks.   - S/p CT head showed no acute intracranial process. No large arterial distribution acute infarct. No acute intracranial hemorrhage.  - Outpatient MR head: Mild reduced diffusion in the juan measuring approximately 1.5cmx1.3cm with mild faint enhancement likely reflecting an early subacute infarct at this point. Mild hemorrhagic conversion along the right jessica-juan. Small areas of reduced diffusion along the bilateral brachium pontis (middle cerebellar peduncles) also likely reflecting subacute lacunar infarcts at this point. No evidence of acute herniation.  - S/p Dysphagia screen-passed    - S/p Lipid panel  - C/w ASA, Atorvastatin.  Added Plavix for Neuro and Attending.    - A1c pending-f/u results  and lipid profile.   - Neuro-Dr. Sanchez consulted, appreciated - P/w Intermittent worsening of slurred speech, left sided weakness, and dizziness present x 3 weeks.   - S/p CT head showed no acute intracranial process. No large arterial distribution acute infarct. No acute intracranial hemorrhage.  - Outpatient MR head: Mild reduced diffusion in the juan measuring approximately 1.5cmx1.3cm with mild faint enhancement likely reflecting an early subacute infarct at this point. Mild hemorrhagic conversion along the right jessica-juan. Small areas of reduced diffusion along the bilateral brachium pontis (middle cerebellar peduncles) also likely reflecting subacute lacunar infarcts at this point. No evidence of acute herniation.  - S/p Dysphagia screen-passed    - S/p Lipid panel  - C/w ASA, Atorvastatin.  Added Plavix for Neuro and Attending.    - A1c pending-f/u results  and lipid profile.  - MR & MRA head noted above confirmed acute stroke w/ ? hemorrhage.    - Neuro-Dr. Sanchez consulted, appreciated - P/w Intermittent worsening of slurred speech, left sided weakness, and dizziness present x 3 weeks.   - S/p CT head showed no acute intracranial process. No large arterial distribution acute infarct. No acute intracranial hemorrhage.  - Outpatient MR head: Mild reduced diffusion in the juan measuring approximately 1.5cmx1.3cm with mild faint enhancement likely reflecting an early subacute infarct at this point. Mild hemorrhagic conversion along the right jessica-juan. Small areas of reduced diffusion along the bilateral brachium pontis (middle cerebellar peduncles) also likely reflecting subacute lacunar infarcts at this point. No evidence of acute herniation.  - S/p Dysphagia screen-passed    - S/p Lipid panel  - C/w ASA, Atorvastatin.  Added Plavix.     - A1c pending-f/u results  and lipid profile.  - MR & MRA head noted above confirmed acute stroke w/ ? hemorrhage.  Discussed w/ Neuro-will review.  C/w Plavix at this time.    - Neuro-Dr. Sanchez consulted, appreciated

## 2023-09-21 NOTE — PROGRESS NOTE ADULT - ASSESSMENT
51 y/o F who ambulates with a walker (only for past 3 weeks) with PMH of DM, HTN, HLD who presented with worsening of left sided weakness, slurred speech, and dizziness that has been present for 3 weeks.  1.Neurology eval noted await MRI/MRA.  2.DM-Insulin, check A1c.  3.HTN-cont bp medication.  4.Lipid d/o-statin.  5.Echocardiogram.  6.GI and DVT prophylaxis.
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
## 2023-09-22 ENCOUNTER — TRANSCRIPTION ENCOUNTER (OUTPATIENT)
Age: 53
End: 2023-09-22

## 2023-09-22 LAB
ANION GAP SERPL CALC-SCNC: 8 MMOL/L — SIGNIFICANT CHANGE UP (ref 5–17)
BUN SERPL-MCNC: 18 MG/DL — SIGNIFICANT CHANGE UP (ref 7–18)
CALCIUM SERPL-MCNC: 8.9 MG/DL — SIGNIFICANT CHANGE UP (ref 8.4–10.5)
CHLORIDE SERPL-SCNC: 102 MMOL/L — SIGNIFICANT CHANGE UP (ref 96–108)
CO2 SERPL-SCNC: 30 MMOL/L — SIGNIFICANT CHANGE UP (ref 22–31)
CREAT SERPL-MCNC: 0.74 MG/DL — SIGNIFICANT CHANGE UP (ref 0.5–1.3)
EGFR: 97 ML/MIN/1.73M2 — SIGNIFICANT CHANGE UP
ERYTHROCYTE [SEDIMENTATION RATE] IN BLOOD: 98 MM/HR — HIGH (ref 0–20)
GLUCOSE BLDC GLUCOMTR-MCNC: 173 MG/DL — HIGH (ref 70–99)
GLUCOSE BLDC GLUCOMTR-MCNC: 249 MG/DL — HIGH (ref 70–99)
GLUCOSE BLDC GLUCOMTR-MCNC: 261 MG/DL — HIGH (ref 70–99)
GLUCOSE BLDC GLUCOMTR-MCNC: 287 MG/DL — HIGH (ref 70–99)
GLUCOSE BLDC GLUCOMTR-MCNC: 300 MG/DL — HIGH (ref 70–99)
GLUCOSE SERPL-MCNC: 216 MG/DL — HIGH (ref 70–99)
HCT VFR BLD CALC: 35 % — SIGNIFICANT CHANGE UP (ref 34.5–45)
HGB BLD-MCNC: 11.1 G/DL — LOW (ref 11.5–15.5)
MCHC RBC-ENTMCNC: 28.1 PG — SIGNIFICANT CHANGE UP (ref 27–34)
MCHC RBC-ENTMCNC: 31.7 GM/DL — LOW (ref 32–36)
MCV RBC AUTO: 88.6 FL — SIGNIFICANT CHANGE UP (ref 80–100)
NRBC # BLD: 0 /100 WBCS — SIGNIFICANT CHANGE UP (ref 0–0)
PLATELET # BLD AUTO: 405 K/UL — HIGH (ref 150–400)
POTASSIUM SERPL-MCNC: 3.9 MMOL/L — SIGNIFICANT CHANGE UP (ref 3.5–5.3)
POTASSIUM SERPL-SCNC: 3.9 MMOL/L — SIGNIFICANT CHANGE UP (ref 3.5–5.3)
RBC # BLD: 3.95 M/UL — SIGNIFICANT CHANGE UP (ref 3.8–5.2)
RBC # FLD: 12 % — SIGNIFICANT CHANGE UP (ref 10.3–14.5)
SODIUM SERPL-SCNC: 140 MMOL/L — SIGNIFICANT CHANGE UP (ref 135–145)
WBC # BLD: 6.19 K/UL — SIGNIFICANT CHANGE UP (ref 3.8–10.5)
WBC # FLD AUTO: 6.19 K/UL — SIGNIFICANT CHANGE UP (ref 3.8–10.5)

## 2023-09-22 PROCEDURE — G0452: CPT | Mod: 26

## 2023-09-22 PROCEDURE — 99233 SBSQ HOSP IP/OBS HIGH 50: CPT

## 2023-09-22 RX ORDER — LOSARTAN POTASSIUM 100 MG/1
50 TABLET, FILM COATED ORAL
Refills: 0 | Status: DISCONTINUED | OUTPATIENT
Start: 2023-09-22 | End: 2023-09-26

## 2023-09-22 RX ADMIN — Medication 650 MILLIGRAM(S): at 13:15

## 2023-09-22 RX ADMIN — Medication 81 MILLIGRAM(S): at 12:21

## 2023-09-22 RX ADMIN — INSULIN GLARGINE 15 UNIT(S): 100 INJECTION, SOLUTION SUBCUTANEOUS at 21:33

## 2023-09-22 RX ADMIN — Medication 650 MILLIGRAM(S): at 12:45

## 2023-09-22 RX ADMIN — ATORVASTATIN CALCIUM 80 MILLIGRAM(S): 80 TABLET, FILM COATED ORAL at 21:33

## 2023-09-22 RX ADMIN — Medication 3 UNIT(S): at 08:27

## 2023-09-22 RX ADMIN — PANTOPRAZOLE SODIUM 40 MILLIGRAM(S): 20 TABLET, DELAYED RELEASE ORAL at 05:35

## 2023-09-22 RX ADMIN — Medication 3 UNIT(S): at 17:52

## 2023-09-22 RX ADMIN — Medication 650 MILLIGRAM(S): at 05:37

## 2023-09-22 RX ADMIN — ENOXAPARIN SODIUM 40 MILLIGRAM(S): 100 INJECTION SUBCUTANEOUS at 17:54

## 2023-09-22 RX ADMIN — Medication 2: at 08:26

## 2023-09-22 RX ADMIN — LOSARTAN POTASSIUM 50 MILLIGRAM(S): 100 TABLET, FILM COATED ORAL at 05:35

## 2023-09-22 RX ADMIN — LOSARTAN POTASSIUM 50 MILLIGRAM(S): 100 TABLET, FILM COATED ORAL at 17:53

## 2023-09-22 RX ADMIN — CLOPIDOGREL BISULFATE 75 MILLIGRAM(S): 75 TABLET, FILM COATED ORAL at 12:21

## 2023-09-22 RX ADMIN — Medication 3: at 17:52

## 2023-09-22 RX ADMIN — Medication 3 UNIT(S): at 12:21

## 2023-09-22 RX ADMIN — Medication 1: at 21:41

## 2023-09-22 RX ADMIN — Medication 650 MILLIGRAM(S): at 06:37

## 2023-09-22 RX ADMIN — Medication 3: at 12:20

## 2023-09-22 NOTE — DISCHARGE NOTE PROVIDER - CARE PROVIDER_API CALL
Pura Saenz  Endocrinology/Metab/Diabetes  [address] [facility] [address] [facility]  [place] [address]  Phone: [phone]  Fax: [phone]  Follow Up Time: 1 week    Pierre Pollack  Vascular Neurology  [address] [place] [id number]  Phone: [phone]  Fax: [phone]  Follow Up Time: Routine    Ellen Sheikh  Cardiology  [address] [place] [address]  Phone: [phone]  Fax: [phone]  Follow Up Time: 1 week    Natacha Dietrich  Internal Medicine  [address] [place] [address]  Phone: [phone]  Fax: [phone]  Follow Up Time: 1 week

## 2023-09-22 NOTE — PHYSICAL THERAPY INITIAL EVALUATION ADULT - ADDITIONAL COMMENTS
-- Above was as of ~ 4 weeks ago  -- owns straight cane, rolling walker and commode  -- Pt. is Rt hand dominant    -- Pt reports now able to be oob and utilize restroom with independence

## 2023-09-22 NOTE — PHYSICAL THERAPY INITIAL EVALUATION ADULT - PERTINENT HX OF CURRENT PROBLEM, REHAB EVAL
CVA,, on MRI "acute infarct is noted in the central juan, in conjunction with a component of T2*shortening/hemorrhage. However a cavernoma in this region cannot be excluded. Further workup and assessment recommended" 2)  nonspecific scattered small white matter lesions both hemispheres, hx subacute infarcts as well. Additional past medical history as detailed below by medical team.  Recently imbalance in past 3 weeks.

## 2023-09-22 NOTE — DISCHARGE NOTE PROVIDER - CARE PROVIDERS DIRECT ADDRESSES
,DirectAddress_Unknown,[email or web address],DirectAddress_Unknown,DirectAddress_Unknown

## 2023-09-22 NOTE — DISCHARGE NOTE PROVIDER - HOSPITAL COURSE
52 year old, Female, who ambulates with a walker (only for past 3 weeks), from home, with PMH of DM, HTN, & HLD.  Presented with worsening of left sided weakness, slurred speech, and dizziness x 3 weeks. On outpatient MR head patient was found to have a subacute infarct in Sandra, and middle cerebellar peduncles.   Admitted for Acute Stroke.     MRI head- An acute infarct is noted in the central sandra, in conjunction with a component of T2*shortening/hemorrhage. neuro consulted recc: Secondary stroke prevention: ASA 81mg (or ASA 325mg rectally if unable to take po) and Lipitor 40mg HS; Plavix x 3 weeks.  If the patient is on anticoagulation, there is no neurological need for ASA or Plavix.  Cardio followed for sinus tachycardia- echo wnl.  PT evaluation recc: outpt PT.      Please note that this a brief summary of hospital course please refer to daily progress notes and consult notes for full course and events   52 year old, Female, who ambulates with a walker (only for past 3 weeks), from home, with PMH of DM, HTN, & HLD.  Presented with worsening of left sided weakness, slurred speech, and dizziness x 3 weeks. On outpatient MR head patient was found to have a subacute infarct in Sandra, and middle cerebellar peduncles.   Admitted for Acute Stroke. Repeat MR head shows acute infarct in the central sandra.   Endocine following for uncontrolled DM. repeat MRI brain with IV contrast to evaluate for possible cavernoma was completed, cavernoma is not excluded.     Discussed with Neurology Dr. Cee, acute infarct in central sandra is not embolic.   Pt will be dc on asa, plavix to complete 21 day course and statin.   Pt will need outpatient physical therapy.      52 year old, Female, who ambulates with a walker (only for past 3 weeks), from home, with PMH of DM, HTN, & HLD.  Presented with worsening of left sided weakness, slurred speech, and dizziness x 3 weeks. On outpatient MR head patient was found to have a subacute infarct in Sandra, and middle cerebellar peduncles.   Admitted for Acute Stroke. Repeat MR head shows acute infarct in the central sandra.   Endocine following for uncontrolled DM. repeat MRI brain with IV contrast to evaluate for possible cavernoma was completed, cavernoma is not excluded.     Discussed with Neurology Dr. Cee, acute infarct in central sandra is not embolic.   Pt will be dc on asa, plavix to complete 21 day course (9/21-10/11) and statin.   Pt will need outpatient physical therapy.      52 year old, Female, who ambulates with a walker (only for past 3 weeks), from home, with PMH of DM, HTN, & HLD.  Presented with worsening of left sided weakness, slurred speech, and dizziness x 3 weeks. On outpatient MR head patient was found to have a subacute infarct in Sandra, and middle cerebellar peduncles.   Admitted for Acute Stroke. Repeat MR head shows acute infarct in the central sandra.   Endocine following for uncontrolled DM. repeat MRI brain with IV contrast to evaluate for possible cavernoma was completed, cavernoma is not excluded.     Discussed with Neurology Dr. Cee, acute infarct in central sandra is not embolic.   Pt will be dc on asa, plavix to complete 21 day course (9/21-10/11) and statin.     Discussed with endocrinology, pt   Pt will need outpatient physical therapy.      52 year old, Female, who ambulates with a walker (only for past 3 weeks), from home, with PMH of DM, HTN, & HLD.  Presented with worsening of left sided weakness, slurred speech, and dizziness x 3 weeks. On outpatient MR head patient was found to have a subacute infarct in Sandra, and middle cerebellar peduncles.   Admitted for Acute Stroke. Repeat MR head shows acute infarct in the central sandra.   Endocine following for uncontrolled DM. repeat MRI brain with IV contrast to evaluate for possible cavernoma was completed, cavernoma is not excluded.     Discussed with Neurology Dr. Cee, acute infarct in central sandra is not embolic.   Pt will be dc on asa, plavix to complete 21 day course (9/21-10/11) and statin.     Discussed with endocrinology, on dc pt will need lantus 35 units at bedtime, admelog 12 units tid (14 units for glucose >150, 16 units for glucose >250, 18 units for glucose >350), pt will also continue ozempic and pioglitazone. Pt will stop jardiance and glipizide.   Pt will need outpatient physical therapy.

## 2023-09-22 NOTE — PROGRESS NOTE ADULT - ASSESSMENT
Impression/ Recommendations:  Left sided weakness, dizziness and slurred speech cw central pontine CVA   Secondary stroke prevention: ASA 81mg (or ASA 325mg rectally if unable to take po) and Lipitor 40mg HS; Plavix x 3 weeks.  BP goal of normal    possible cavernoma, MRI brain with contrast for eval, can be done as outpatient    neuro fu in 2 weeks

## 2023-09-22 NOTE — PROGRESS NOTE ADULT - PROBLEM SELECTOR PLAN 7
- C/w Lovenox for DVT ppx  - C/w Protonix for GI ppx
- C/w Lovenox for DVT ppx  - C/w Protonix for GI ppx

## 2023-09-22 NOTE — PROGRESS NOTE ADULT - PROBLEM SELECTOR PLAN 3
- P/w Elevated REQ=[id number], unknown etiology  - ESR in AM-f/u results - P/w Elevated [id number], unknown etiology  - ESR with slight downtrend, not trending daily.
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
## 2023-09-22 NOTE — PROGRESS NOTE ADULT - SUBJECTIVE AND OBJECTIVE BOX
Patient is a 52y old  Female who presents with a chief complaint of CVA (21 Sep 2023 11:53)    pt seen in tele [x], reg med floor [   ], bed [ x ], chair at bedside [   ], a+o x3 [ x], lethargic [  ],    nad [x ]      Allergies    peppers (Rash)  NSAIDs (Short breath)        Vitals    T(F): 98.6 (09-22-23 @ 04:53), Max: 98.6 (09-21-23 @ 20:36)  HR: 80 (09-22-23 @ 04:53) (80 - 94)  BP: 122/54 (09-22-23 @ 04:53) (122/54 - 149/64)  RR: 18 (09-22-23 @ 04:53) (16 - 19)  SpO2: 96% (09-22-23 @ 04:53) (92% - 96%)  Wt(kg): --  CAPILLARY BLOOD GLUCOSE      POCT Blood Glucose.: 262 mg/dL (21 Sep 2023 20:56)      Labs                          11.9   7.09  )-----------( 396      ( 20 Sep 2023 10:15 )             36.7       09-20    137  |  97  |  14  ----------------------------<  213<H>  3.9   |  31  |  0.65    Ca    9.5      20 Sep 2023 10:15  Phos  2.7     09-20  Mg     1.7     09-20            Troponin I, High Sensitivity Result: 3.4 ng/L (09-19-23 @ 19:40)        Radiology Results      Meds    MEDICATIONS  (STANDING):  aspirin  chewable 81 milliGRAM(s) Oral daily  atorvastatin 80 milliGRAM(s) Oral at bedtime  clopidogrel Tablet 75 milliGRAM(s) Oral daily  enoxaparin Injectable 40 milliGRAM(s) SubCutaneous every 24 hours  hydrochlorothiazide 12.5 milliGRAM(s) Oral daily  insulin glargine Injectable (LANTUS) 15 Unit(s) SubCutaneous at bedtime  insulin lispro (ADMELOG) corrective regimen sliding scale   SubCutaneous Before meals and at bedtime  insulin lispro Injectable (ADMELOG) 3 Unit(s) SubCutaneous three times a day before meals  losartan 50 milliGRAM(s) Oral daily  pantoprazole    Tablet 40 milliGRAM(s) Oral before breakfast      MEDICATIONS  (PRN):  acetaminophen     Tablet .. 650 milliGRAM(s) Oral every 6 hours PRN Temp greater or equal to 38C (100.4F), Mild Pain (1 - 3)  melatonin 3 milliGRAM(s) Oral at bedtime PRN Insomnia  ondansetron Injectable 4 milliGRAM(s) IV Push every 8 hours PRN Nausea and/or Vomiting      Physical Exam    Neuro :  no focal deficits  Respiratory: CTA B/L  CV: RRR, S1S2, no murmurs,   Abdominal: Soft, NT, ND +BS,  Extremities: No edema, + peripheral pulses    ASSESSMENT    r/o acute cns patho,   r/o evolving cva,   r/o arrythmia,   uncontrolled dm  h/o early subacute infarct at this point. Mild hemorrhagic conversion along the right jessica-juan, subacute lacunar infarcts,    DM,   HTN,   HLD      PLAN    cont tele,   cont aspirin, statin,   neuro f/u   Admit to telemetry for 24hours to evaluate for arrythmias/ Afib.  f/u MRI brain, MRA head without contrast, Carotid duplex (CD).    If unable to get MR imaging, please consider CTA head and neck in 24hours (no need for CD in this case).    If the patient is unable to get MR and unable to get IV contrast please repeat the CTH in 24hours and get a CD.    Role of the imaging is to evaluate for stroke and evaluate vasculature for arthrosclerosis/ thrombi which may have lead to a stroke.  Secondary stroke prevention: ASA 81mg (or ASA 325mg rectally if unable to take po) and Lipitor 40mg HS; Plavix x 3 weeks.  If the patient is on anticoagulation, there is no neurological need for ASA or Plavix.  STAT CTH IF the patient has sudden change in mental status or neurological exa  trop x1 neg noted above   cardio f/u   f/u echo   hold outpt oral dm meds,   hgba1c 15.5 noted above  lispro ss,   lantus 15 units qhs,   endo cons   phys tx eval   cont current meds          Patient is a 52y old  Female who presents with a chief complaint of CVA (21 Sep 2023 11:53)    pt seen in tele [x], reg med floor [   ], bed [ x ], chair at bedside [   ], a+o x3 [ x], lethargic [  ],    nad [x ]      Allergies    peppers (Rash)  NSAIDs (Short breath)        Vitals    T(F): 98.6 (09-22-23 @ 04:53), Max: 98.6 (09-21-23 @ 20:36)  HR: 80 (09-22-23 @ 04:53) (80 - 94)  BP: 122/54 (09-22-23 @ 04:53) (122/54 - 149/64)  RR: 18 (09-22-23 @ 04:53) (16 - 19)  SpO2: 96% (09-22-23 @ 04:53) (92% - 96%)  Wt(kg): --  CAPILLARY BLOOD GLUCOSE      POCT Blood Glucose.: 262 mg/dL (21 Sep 2023 20:56)      Labs                          11.9   7.09  )-----------( 396      ( 20 Sep 2023 10:15 )             36.7       09-20    137  |  97  |  14  ----------------------------<  213<H>  3.9   |  31  |  0.65    Ca    9.5      20 Sep 2023 10:15  Phos  2.7     09-20  Mg     1.7     09-20            Troponin I, High Sensitivity Result: 3.4 ng/L (09-19-23 @ 19:40)  < from: Transthoracic Echocardiogram (09.20.23 @ 07:17) >  CONCLUSIONS:  1. Trace mitral regurgitation.  2. Normal left ventricular internal dimensions and wall  thicknesses.  3. Endocardium not well visualized; grossly normal left  ventricular systolic function.   Segmental wall motion  could not be assessed.  4. Normal right ventricular size and function.  Ejection Fraction Visual Estimate: >55 %  Agitated saline injection was negative for  intracardiac shunt.      < end of copied text >        Radiology Results     < from: MR Head No Cont (09.21.23 @ 12:21) >  IMPRESSION:    1)  An acute infarct is noted in the central juan, in conjunction with a   component of T2*shortening/hemorrhage. However a cavernoma in this region   cannot be excluded. Further workup and assessment recommended.  2)  nonspecific scattered small white matter lesions both hemispheres,   with no acute supratentorial infarct or hemorrhage.    < end of copied text >      < from: MR Angio Head No Cont (09.21.23 @ 12:21) >  IMPRESSION:  Unremarkable MR angiographic study of the brain. No obvious vascular   malformation noted in the posterior fossa. However CTA imaging of the   head and neck may be considered for further assessment.    < end of copied text >      Meds    MEDICATIONS  (STANDING):  aspirin  chewable 81 milliGRAM(s) Oral daily  atorvastatin 80 milliGRAM(s) Oral at bedtime  clopidogrel Tablet 75 milliGRAM(s) Oral daily  enoxaparin Injectable 40 milliGRAM(s) SubCutaneous every 24 hours  hydrochlorothiazide 12.5 milliGRAM(s) Oral daily  insulin glargine Injectable (LANTUS) 15 Unit(s) SubCutaneous at bedtime  insulin lispro (ADMELOG) corrective regimen sliding scale   SubCutaneous Before meals and at bedtime  insulin lispro Injectable (ADMELOG) 3 Unit(s) SubCutaneous three times a day before meals  losartan 50 milliGRAM(s) Oral daily  pantoprazole    Tablet 40 milliGRAM(s) Oral before breakfast      MEDICATIONS  (PRN):  acetaminophen     Tablet .. 650 milliGRAM(s) Oral every 6 hours PRN Temp greater or equal to 38C (100.4F), Mild Pain (1 - 3)  melatonin 3 milliGRAM(s) Oral at bedtime PRN Insomnia  ondansetron Injectable 4 milliGRAM(s) IV Push every 8 hours PRN Nausea and/or Vomiting      Physical Exam    Neuro :  no focal deficits  Respiratory: CTA B/L  CV: RRR, S1S2, no murmurs,   Abdominal: Soft, NT, ND +BS,  Extremities: No edema, + peripheral pulses    ASSESSMENT    r/o acute cns patho,   r/o evolving cva,   r/o arrythmia,   uncontrolled dm  h/o early subacute infarct at this point. Mild hemorrhagic conversion along the right jessica-juan, subacute lacunar infarcts,    DM,   HTN,   HLD      PLAN    cont tele,   cont aspirin, statin,   neuro f/u   MRI brain with An acute infarct is noted in the central juan, in conjunction with a   component of T2*shortening/hemorrhage. However a cavernoma in this region   cannot be excluded. Further workup and assessment recommended.  nonspecific scattered small white matter lesions both hemispheres,   with no acute supratentorial infarct or hemorrhage noted above.  MRA head with Unremarkable MR angiographic study of the brain. No obvious vascular   malformation noted in the posterior fossa. However CTA imaging of the head and neck   may be considered for further assessment noted above.   Carotid duplex (CD).    Secondary stroke prevention: ASA 81mg (or ASA 325mg rectally if unable to take po) and Lipitor 40mg HS; Plavix x 3 weeks.   STAT CTH IF the patient has sudden change in mental status or neurological exa  trop x1 neg noted above   cardio f/u   echo with levf >55%, agitated saline injection was negative for intracardiac shunt noted above.  hold outpt oral dm meds,   hgba1c 15.5 noted   lispro ss,   lantus 15 units qhs,   add lispro 3 units ac tid  endo cons   phys tx eval   cont current meds

## 2023-09-22 NOTE — PROGRESS NOTE ADULT - PROBLEM SELECTOR PLAN 6
H/o DM on Glipizide, Ozempic, Jardiance, Pioglitazone, and Novolog 70/30 40U BID.   - A1c=15.5, uncontrolled  - Continue to monitor and adjust insulin accordingly  - C/w Lantus, HSS and Premeal H/o DM on Glipizide, Ozempic, Jardiance, Pioglitazone, and Novolog 70/30 40U BID.   - A1c=15.5, uncontrolled  - Continue to monitor and adjust insulin accordingly  - C/w Lant, SUJEY and Premeal  - Endo consult pending-f/u results H/o DM on Glipizide, Ozempic, Jardiance, Pioglitazone, and Novolog 70/30 40U BID  - A1c=15.5, uncontrolled  - Continue to monitor and adjust insulin accordingly  - C/w SUJEY Price and Premeal  - Endo consult-Dr. Oscar pending-f/u recommendations H/o DM on Glipizide, Ozempic, Jardiance, Pioglitazone, and Novolog 70/30 40U BID  - A1c=15.5, uncontrolled  - Continue to monitor and adjust insulin accordingly  - C/w Lant, HSS and Premeal  - Endo consult-Dr. Oscar-Dr. Saenz is covering until 9/25 pending-f/u recommendations

## 2023-09-22 NOTE — PHYSICAL THERAPY INITIAL EVALUATION ADULT - IMPAIRMENTS CONTRIBUTING TO GAIT DEVIATIONS, PT EVAL
impaired balance/impaired coordination/pain/decreased sensation/impaired sensory feedback/decreased strength

## 2023-09-22 NOTE — PROGRESS NOTE ADULT - PROBLEM SELECTOR PLAN 1
- P/w Intermittent worsening of slurred speech, left sided weakness, and dizziness present x 3 weeks.   - S/p CT head showed no acute intracranial process. No large arterial distribution acute infarct. No acute intracranial hemorrhage.  - Outpatient MR head: Mild reduced diffusion in the juan measuring approximately 1.5cmx1.3cm with mild faint enhancement likely reflecting an early subacute infarct at this point. Mild hemorrhagic conversion along the right jessica-juan. Small areas of reduced diffusion along the bilateral brachium pontis (middle cerebellar peduncles) also likely reflecting subacute lacunar infarcts at this point. No evidence of acute herniation.  - S/p Dysphagia screen-passed    - S/p Lipid panel & A1c   - C/w ASA, Atorvastatin & Plavix     - MR & MRA head noted above confirmed acute stroke w/ ? hemorrhage.  Discussed w/ Neuro-will review.  C/w Plavix at this time.    - Hypercoagulable labs ordered and genetics form completed and signed by pt.  F/u Hypercoagulable results.    - Neuro-Dr. Sanchez consulted, appreciated - P/w Intermittent worsening of slurred speech, left sided weakness, and dizziness present x 3 weeks.   - S/p CT head showed no acute intracranial process. No large arterial distribution acute infarct. No acute intracranial hemorrhage.  - Outpatient MR head: Mild reduced diffusion in the juan measuring approximately 1.5cmx1.3cm with mild faint enhancement likely reflecting an early subacute infarct at this point. Mild hemorrhagic conversion along the right jessica-juan. Small areas of reduced diffusion along the bilateral brachium pontis (middle cerebellar peduncles) also likely reflecting subacute lacunar infarcts at this point. No evidence of acute herniation.  - S/p Dysphagia screen-passed    - S/p Lipid panel & A1c   - C/w ASA, Atorvastatin & Plavix     - S/p MR & MRA head noted above confirmed acute stroke  - On MR ? hemorrhage is a "possible cavernoma".  Neuro recommended out pt MR brain with contrast for evaluation.  Pt to f/u w/ Neuro in 2 weeks.    - Hypercoagulable labs ordered and genetics form completed and signed by pt.  F/u Hypercoagulable results.    - Neuro-Dr. Sanchez consulted, appreciated - P/w Intermittent worsening of slurred speech, left sided weakness, and dizziness present x 3 weeks.   - S/p CT head showed no acute intracranial process. No large arterial distribution acute infarct. No acute intracranial hemorrhage.  - Outpatient MR head: Mild reduced diffusion in the juan measuring approximately 1.5cmx1.3cm with mild faint enhancement likely reflecting an early subacute infarct at this point. Mild hemorrhagic conversion along the right jessica-juan. Small areas of reduced diffusion along the bilateral brachium pontis (middle cerebellar peduncles) also likely reflecting subacute lacunar infarcts at this point. No evidence of acute herniation.  - S/p Dysphagia screen-passed    - S/p Lipid panel & A1c   - C/w ASA, Atorvastatin & Plavix     - S/p MR & MRA head noted above confirmed acute stroke  - On MR ? hemorrhage is a "possible cavernoma".  Neuro recommended out pt MR brain with contrast for evaluation.  Pt to f/u w/ Neuro in 2 weeks.    - Hypercoagulable labs ordered and genetics form completed and signed by pt.  F/u Hypercoagulable results.    - Neuro-Dr. Sanchez consulted, appreciated  - PT recommended Outpt PT - P/w Intermittent worsening of slurred speech, left sided weakness, and dizziness present x 3 weeks.   - S/p CT head showed no acute intracranial process. No large arterial distribution acute infarct. No acute intracranial hemorrhage.  - Outpatient MR head: Mild reduced diffusion in the juan measuring approximately 1.5cmx1.3cm with mild faint enhancement likely reflecting an early subacute infarct at this point. Mild hemorrhagic conversion along the right jessica-juan. Small areas of reduced diffusion along the bilateral brachium pontis (middle cerebellar peduncles) also likely reflecting subacute lacunar infarcts at this point. No evidence of acute herniation.  - S/p Dysphagia screen-passed    - S/p Lipid panel & A1c   - C/w ASA, Atorvastatin & Plavix     - S/p MR & MRA head noted above confirmed acute stroke  - On MR ? hemorrhage is a "possible cavernoma".  Neuro recommended out pt MR brain with contrast for evaluation.  Pt to f/u w/ Neuro in 2 weeks.    - Hypercoagulable labs ordered and genetics form completed and signed by pt.  F/u Hypercoagulable results.    - Carotid US pending-f/u results   - Neuro-Dr. Sanchez consulted, appreciated  - PT recommended Outpt PT

## 2023-09-22 NOTE — PHYSICAL THERAPY INITIAL EVALUATION ADULT - FUNCTIONAL LIMITATIONS, PT EVAL
MODIFIED ENRIQUE SCALE:   2: Slight Disability: Unable to carry out all previous activities, but able to look after own affairs without assistance.     3: Moderate Disability: Requiring some help, but able to walk without assistance./community/leisure

## 2023-09-22 NOTE — DISCHARGE NOTE PROVIDER - PROVIDER TOKENS
PROVIDER:[TOKEN:[[id number]],FOLLOWUP:[1 week]],PROVIDER:[TOKEN:[[id number]],FOLLOWUP:[Routine]],PROVIDER:[TOKEN:[[id number]],FOLLOWUP:[1 week]],PROVIDER:[TOKEN:[[id number]],FOLLOWUP:[1 week]]

## 2023-09-22 NOTE — PHYSICAL THERAPY INITIAL EVALUATION ADULT - GENERAL OBSERVATIONS, REHAB EVAL
Consult received, chart reviewed. Patient received supine in bed, NAD, +tele. Patient agreed to EVALUATION from Physical Therapist. Pt reports symptom have significantly improve. Speech and ability to eat have returned to normal.

## 2023-09-22 NOTE — DISCHARGE NOTE PROVIDER - NSFOLLOWUPCLINICS_GEN_ALL_ED_FT
[facility]  Neurology  [address] [place] [address]  Phone: [phone]  Fax: [phone]  Follow Up Time: Routine

## 2023-09-22 NOTE — PROGRESS NOTE ADULT - ASSESSMENT
51 y/o F who ambulates with a walker (only for past 3 weeks) with PMH of DM, HTN, HLD who presented with worsening of left sided weakness, slurred speech, and dizziness that has been present for 3 weeks,acute Sandra CVA.  1.Tele monitoring.  2.DM-Insulin, check A1c.  3.HTN-cont bp medication.  4.Lipid d/o-statin.  5.R/O hypercoag state.  6.Neurology f/u.  7.GI and DVT prophylaxis. 51 y/o F who ambulates with a walker (only for past 3 weeks) with PMH of DM, HTN, HLD who presented with worsening of left sided weakness, slurred speech, and dizziness that has been present for 3 weeks,acute Sandra CVA.  1.Tele monitoring.  2.DM-Insulin, check A1c.  3.HTN-d/c hctz, inc cozaar 50mg  bid.  4.Lipid d/o-statin.  5.Elevated ESR of 98-R/O hypercoag state.  6.Neurology f/u.  7.GI and DVT prophylaxis. 53 y/o F who ambulates with a walker (only for past 3 weeks) with PMH of DM, HTN, HLD who presented with worsening of left sided weakness, slurred speech, and dizziness that has been present for 3 weeks,acute Sandra CVA.  1.Tele monitoring.  2.DM-Insulin, check A1c.  3.HTN-d/c hctz, inc cozaar 50mg  bid.  4.Lipid d/o-statin.  5.Elevated ESR of 98-R/O hypercoag state. Carotid doppler.  6.Neurology f/u.  7.GI and DVT prophylaxis.

## 2023-09-22 NOTE — DISCHARGE NOTE PROVIDER - NSDCCPCAREPLAN_GEN_ALL_CORE_FT
PRINCIPAL DISCHARGE DIAGNOSIS  Diagnosis: Cerebral infarct  Assessment and Plan of Treatment: You presented to the hospital with complaints of left sided weakness and slurred speech . YOu had an MRI of your head which was positive for an acute stroke. You were followed by a neurologist. A stroke is a brain attack that occurs when an artery or a blood vessel becomes occluded breaks, interrupting blood flow to an area of the brain cells begin to die.   Please take your medicatyions as prescribed.  You were seen by a Physical therapist who reccommends you go to outpatient PT.  Please call 911 for facial droop slurring speech, unable to move a limb or sudden weakness in one limb or one side of the body or confusion.        SECONDARY DISCHARGE DIAGNOSES  Diagnosis: Hypertension  Assessment and Plan of Treatment: You have a history of high blood pressure. High blood pressure is a condition that puts you at risk for heart attack, stroke and kidney disease. Please continue to take your medications as prescribed. You can also help control your blood pressure by maintaining a healthy weight, eating a diet low in fat and rich in fruits and vegetables, reduce the amount of salt in your diet. Also, reduce alcohol and try to include some form of physical activity daily for at least 30 mins. Follow up with your medical doctor to establish long term blood pressure treatment goals.  Notify your doctor if you have any of the following symptoms:   Dizziness, Lightheadedness, Blurry vision, Headache, Chest pain, Shortness of breath      Diagnosis: Hyperlipidemia  Assessment and Plan of Treatment: You have a history of hyperlipidemia, which is when you have too much cholesterol in your blood. High amounts of cholesterol in your blood can put you at higher risks for heart attck, strokes and other health problems. Follow up with PCP for treatment goals, continue medication as prescribed, have liver function testing every 3 months as anti lipid medications can cause liver irritation, eat low fat meals, avoid red meat, butter, fried foods and cheese. Get daily exercise.      Diagnosis: Diabetes mellitus  Assessment and Plan of Treatment: Continue to follow with your primary care MD or your endocrinologist. Discuss what the goal hemoglobin A1C level is for you.  Follow a heart healthy diabetic diet. If you check your fingerstick glucose at home, call your MD if it is greater than 250mg/dL on 2 occasions or less than 100mg/dL on 2 occasions. Know signs of low blood sugar, such as: dizziness, shakiness, sweating, confusion, hunger, nervousness- drink 4 ounces apple juice if occurs and call your doctor. Know early signs of high blood sugar, such as: frequent urination, increased thirst, blurry vision, fatigue, headache - call your doctor if this occurs.      Diagnosis: Tachycardia  Assessment and Plan of Treatment: You were followed by a cardiologist for elevated heart rate. you were monitored on telemetry and had an echocardiogram. You echo was without any acute pathology. Please follow up with your PCP .  Tachycardia (fast heart rate) is when your heart rate is 100 beats per minute or more at rest.  A fast heart rate at rest may be caused by strong emotions, fever, activity, some medicines, drugs, or caffeine.     PRINCIPAL DISCHARGE DIAGNOSIS  Diagnosis: CVA (cerebrovascular accident)  Assessment and Plan of Treatment: you were found to have an acute stroke in a part of your brain called central juan.   you will need to take aspirin 81 mg daily and atorvastatin 80 mg daily  you will need to complete 21 days of plavix 75 mg daily until 10/11  please see referral for Neurology Clinic and make an appointment.   Call your local emergency number (911 in the [place]) or have someone call if:  You have any of the following signs of a stroke:  slurred speech  Numbness or drooping on one side of your face  Weakness in an arm or leg  Confusion or difficulty speaking  Dizziness, a severe headache, or vision loss  You have a seizure.  You have chest pain or shortness of breath.        SECONDARY DISCHARGE DIAGNOSES  Diagnosis: Cavernoma  Assessment and Plan of Treatment: you had a MRI of your brain on 9/25 that showed a possible cavernoma  you were evaluated by a Neurologist in the hospital and there is no intervention needed in the hospital.   see referral for Vascular Neurologist Dr. Pollack, make an appointment.    Diagnosis: Uncontrolled diabetes mellitus with hyperglycemia  Assessment and Plan of Treatment: Please follow up with Diabetes Specialist Dr. Saenz in 1 week.   HgA1C >15.5 this admission.  Make sure you get your HgA1c checked every three months.  If you take oral diabetes medications, check your blood glucose two times a day.  If you take insulin, check your blood glucose before meals and at bedtime.  It's important not to skip any meals.  Keep a log of your blood glucose results and always take it with you to your doctor appointments.  Keep a list of your current medications including injectables and over the counter medications and bring this medication list with you to all your doctor appointments.  If you have not seen your ophthalmologist this year call for appointment.  Check your feet daily for redness, sores, or openings. Do not self treat. If no improvement in two days call your primary care physician for an appointment.  Low blood sugar (hypoglycemia) is a blood sugar below 70mg/dl. Check your blood sugar if you feel signs/symptoms of hypoglycemia. If your blood sugar is below 70 take 15 grams of carbohydrates (ex 4 oz of apple juice, 3-4 glucose tablets, or 4-6 oz of regular soda) wait 15 minutes and repeat blood sugar to make sure it comes up above 70.  If your blood sugar is above 70 and you are due for a meal, have a meal.  If you are not due for a meal have a snack.  This snack helps keeps your blood sugar at a safe range.    Diagnosis: Hypertension  Assessment and Plan of Treatment: continue taking losartan 50 mg twice a day  your blood pressure ranged between: (102/46 - 153/66)  follow up with your primary care doctor or cardiologist Dr. Sheikh  try to take your blood pressure readings twice a day, morning and night time.   Notify your doctor if you have any of the following symptoms:   Dizziness, Lightheadedness, Blurry vision, Headache, Chest pain, Shortness of breath      Diagnosis: Hyperlipidemia  Assessment and Plan of Treatment: continue taking atorvastatin 80 mg daily     PRINCIPAL DISCHARGE DIAGNOSIS  Diagnosis: CVA (cerebrovascular accident)  Assessment and Plan of Treatment: you were found to have an acute stroke in a part of your brain called central juan.   you will need to take aspirin 81 mg daily and atorvastatin 80 mg daily  you will need to complete 21 days of plavix 75 mg daily until 10/11  please see referral for Neurology Clinic and make an appointment.   Call your local emergency number (911 in the [place]) or have someone call if:  You have any of the following signs of a stroke:  slurred speech  Numbness or drooping on one side of your face  Weakness in an arm or leg  Confusion or difficulty speaking  Dizziness, a severe headache, or vision loss  You have a seizure.  You have chest pain or shortness of breath.        SECONDARY DISCHARGE DIAGNOSES  Diagnosis: Uncontrolled diabetes mellitus with hyperglycemia  Assessment and Plan of Treatment: Please follow up with Diabetes Specialist Dr. Saenz in 1 week.   HgA1C >15.5 this admission.  Make sure you get your HgA1c checked every three months.  If you take oral diabetes medications, check your blood glucose two times a day.  If you take insulin, check your blood glucose before meals and at bedtime.  It's important not to skip any meals.  Keep a log of your blood glucose results and always take it with you to your doctor appointments.  Keep a list of your current medications including injectables and over the counter medications and bring this medication list with you to all your doctor appointments.  If you have not seen your ophthalmologist this year call for appointment.  Check your feet daily for redness, sores, or openings. Do not self treat. If no improvement in two days call your primary care physician for an appointment.  Low blood sugar (hypoglycemia) is a blood sugar below 70mg/dl. Check your blood sugar if you feel signs/symptoms of hypoglycemia. If your blood sugar is below 70 take 15 grams of carbohydrates (ex 4 oz of apple juice, 3-4 glucose tablets, or 4-6 oz of regular soda) wait 15 minutes and repeat blood sugar to make sure it comes up above 70.  If your blood sugar is above 70 and you are due for a meal, have a meal.  If you are not due for a meal have a snack.  This snack helps keeps your blood sugar at a safe range.    Diagnosis: Cavernoma  Assessment and Plan of Treatment: you had a MRI of your brain on 9/25 that showed a possible cavernoma  you were evaluated by a Neurologist in the hospital and there is no intervention needed in the hospital.   see referral for Vascular Neurologist Dr. Pollack, make an appointment.  Cavernomas are enlarged and deformed blood vessels gathered into clusters. These clusters, called angiomas, may look bubbly, like raspberries. A cavernoma can appear in the brain, spinal cord or other parts of the nervous system and body, including on the skin and eye.  Cavernomas can cause:  Leaking blood  Arm or leg weakness  Damaged vision, balance, memory, or attention  Stroke in younger people    Diagnosis: Hypertension  Assessment and Plan of Treatment: continue taking losartan 50 mg twice a day  your blood pressure ranged between: (102/46 - 153/66)  follow up with your primary care doctor or cardiologist Dr. Sheikh  try to take your blood pressure readings twice a day, morning and night time.   Notify your doctor if you have any of the following symptoms:   Dizziness, Lightheadedness, Blurry vision, Headache, Chest pain, Shortness of breath      Diagnosis: Hyperlipidemia  Assessment and Plan of Treatment: continue taking atorvastatin 80 mg daily     PRINCIPAL DISCHARGE DIAGNOSIS  Diagnosis: CVA (cerebrovascular accident)  Assessment and Plan of Treatment: you were found to have an acute stroke in a part of your brain called central juan.   you will need to take aspirin 81 mg daily and atorvastatin 80 mg daily  you will need to complete 21 days of plavix 75 mg daily until 10/11  please see referral for Neurology Clinic and make an appointment.   Call your local emergency number (911 in the [place]) or have someone call if:  You have any of the following signs of a stroke:  slurred speech  Numbness or drooping on one side of your face  Weakness in an arm or leg  Confusion or difficulty speaking  Dizziness, a severe headache, or vision loss  You have a seizure.  You have chest pain or shortness of breath.        SECONDARY DISCHARGE DIAGNOSES  Diagnosis: Uncontrolled diabetes mellitus with hyperglycemia  Assessment and Plan of Treatment: Please follow up with Diabetes Specialist Dr. Saenz in 1 week.   it was discussed with you that you will need to take:  1) Lantus 35 units at bedtime   2) Admelog 12 units three times a day with meals  if your glucose is >150, take 14 units  if your glucose is >250, take 16 units  if your glucose is >350, take 18 units  3) keep taking ozempic weekly  4) continue pioglitazone daily  Stop Jardiance and Glipizide  HgA1C >15.5 this admission.  Make sure you get your HgA1c checked every three months.  If you take oral diabetes medications, check your blood glucose two times a day.  If you take insulin, check your blood glucose before meals and at bedtime.  It's important not to skip any meals.  Keep a log of your blood glucose results and always take it with you to your doctor appointments.  Keep a list of your current medications including injectables and over the counter medications and bring this medication list with you to all your doctor appointments.  If you have not seen your ophthalmologist this year call for appointment.  Check your feet daily for redness, sores, or openings. Do not self treat. If no improvement in two days call your primary care physician for an appointment.  Low blood sugar (hypoglycemia) is a blood sugar below 70mg/dl. Check your blood sugar if you feel signs/symptoms of hypoglycemia. If your blood sugar is below 70 take 15 grams of carbohydrates (ex 4 oz of apple juice, 3-4 glucose tablets, or 4-6 oz of regular soda) wait 15 minutes and repeat blood sugar to make sure it comes up above 70.  If your blood sugar is above 70 and you are due for a meal, have a meal.  If you are not due for a meal have a snack.  This snack helps keeps your blood sugar at a safe range.    Diagnosis: Cavernoma  Assessment and Plan of Treatment: you had a MRI of your brain on 9/25 that showed a possible cavernoma  you were evaluated by a Neurologist in the hospital and there is no intervention needed in the hospital.   see referral for Vascular Neurologist Dr. Pollack, make an appointment.  Cavernomas are enlarged and deformed blood vessels gathered into clusters. These clusters, called angiomas, may look bubbly, like raspberries. A cavernoma can appear in the brain, spinal cord or other parts of the nervous system and body, including on the skin and eye.  Usually, you won’t know until the cavernoma causes a seizure.  Symptoms generally appear when cavernomas bleed, and they may include:  Seizure  Sudden headache  Nausea and/or vomiting  Weakness or numbness on one side of the body  Difficulty speaking or understanding speech  Double vision or loss of vision  Balance problems  Cavernomas on the skin are often seen in cases of familial cavernoma. They usually appear on your arms or legs, and have the same bubbly, berry-like appearance.  if you experience any of these symptoms please call 911 and seek help immediately    Diagnosis: Hypertension  Assessment and Plan of Treatment: continue taking losartan 50 mg twice a day  your blood pressure ranged between: (102/46 - 153/66)  follow up with your primary care doctor or cardiologist Dr. Sheikh  try to take your blood pressure readings twice a day, morning and night time.   Notify your doctor if you have any of the following symptoms:   Dizziness, Lightheadedness, Blurry vision, Headache, Chest pain, Shortness of breath      Diagnosis: Hyperlipidemia  Assessment and Plan of Treatment: continue taking atorvastatin 80 mg daily

## 2023-09-22 NOTE — PROGRESS NOTE ADULT - SUBJECTIVE AND OBJECTIVE BOX
NP Note discussed with  primary attending    Patient is a 52y old  Female who presents with a chief complaint of CVA (22 Sep 2023 09:24)      INTERVAL HPI/OVERNIGHT EVENTS: Pt reports "I'm trying to become independent."  Reports and tearful that she lives w/ her 2 children and .  Reports her son has Asperger's and her dtr has ADHD and Autism.  Tearful because she worries what will happen to them when they get older.  Reports seeing a therapist weekly but denies taking medication for psychiatric diagnosis.  NP offered and informed pt that  is available to talk with her should she need or want to talk to a therapist.  Declined at this time.      Denies HA, spinning sensation, or new symptoms.  Denies CP, SOB, or abdominal pain.      MEDICATIONS  (STANDING):  aspirin  chewable 81 milliGRAM(s) Oral daily  atorvastatin 80 milliGRAM(s) Oral at bedtime  clopidogrel Tablet 75 milliGRAM(s) Oral daily  enoxaparin Injectable 40 milliGRAM(s) SubCutaneous every 24 hours  insulin glargine Injectable (LANTUS) 15 Unit(s) SubCutaneous at bedtime  insulin lispro (ADMELOG) corrective regimen sliding scale   SubCutaneous Before meals and at bedtime  insulin lispro Injectable (ADMELOG) 3 Unit(s) SubCutaneous three times a day before meals  losartan 50 milliGRAM(s) Oral two times a day  pantoprazole    Tablet 40 milliGRAM(s) Oral before breakfast    MEDICATIONS  (PRN):  acetaminophen     Tablet .. 650 milliGRAM(s) Oral every 6 hours PRN Temp greater or equal to 38C (100.4F), Mild Pain (1 - 3)  melatonin 3 milliGRAM(s) Oral at bedtime PRN Insomnia  ondansetron Injectable 4 milliGRAM(s) IV Push every 8 hours PRN Nausea and/or Vomiting      __________________________________________________  REVIEW OF SYSTEMS:    CONSTITUTIONAL: No fever  EYES: No acute visual disturbances  NECK: No pain or stiffness  RESPIRATORY: No cough; No shortness of breath  CARDIOVASCULAR: No chest pain, no palpitations  GASTROINTESTINAL: No pain. No nausea or vomiting.  No diarrhea   NEUROLOGICAL: No headache or numbness, no tremors  MUSCULOSKELETAL: No joint pain, no muscle pain  GENITOURINARY: No dysuria, no frequency, no hesitancy  PSYCHIATRY: No depression , no anxiety  ALL OTHER  ROS negative        Vital Signs Last 24 Hrs  T(C): 37.2 (22 Sep 2023 11:00), Max: 37.2 (22 Sep 2023 11:00)  T(F): 99 (22 Sep 2023 11:00), Max: 99 (22 Sep 2023 11:00)  HR: 92 (22 Sep 2023 11:00) (80 - 100)  BP: 135/60 (22 Sep 2023 11:00) (116/52 - 149/64)  RR: 18 (22 Sep 2023 11:00) (17 - 19)  SpO2: 92% (22 Sep 2023 11:00) (92% - 98%)    Parameters below as of 22 Sep 2023 11:00  Patient On (Oxygen Delivery Method): room air        ________________________________________________  PHYSICAL EXAM:  GENERAL: NAD  HEENT: Normocephalic;  conjunctivae and sclerae clear; moist mucous membranes.  (+) Facial   NECK : Supple  CHEST/LUNG: Clear to auscultation bilaterally with good air entry   HEART: S1 S2  regular; no murmurs, gallops or rubs  ABDOMEN: Soft, Nontender, Nondistended; Bowel sounds present x 4 quad  EXTREMITIES: No cyanosis; no edema; no calf tenderness  SKIN: Warm and dry; no rash  NERVOUS SYSTEM:  Awake and alert; Oriented to place, person and time; no new deficits      _________________________________________________  LABS:                        11.1   6.19  )-----------( 405      ( 22 Sep 2023 06:41 )             35.0     09-22    140  |  102  |  18  ----------------------------<  216<H>  3.9   |  30  |  0.74    Ca    8.9      22 Sep 2023 06:41        Urinalysis Basic - ( 22 Sep 2023 06:41 )    Color: x / Appearance: x / SG: x / pH: x  Gluc: 216 mg/dL / Ketone: x  / Bili: x / Urobili: x   Blood: x / Protein: x / Nitrite: x   Leuk Esterase: x / RBC: x / WBC x   Sq Epi: x / Non Sq Epi: x / Bacteria: x      CAPILLARY BLOOD GLUCOSE      POCT Blood Glucose.: 287 mg/dL (22 Sep 2023 11:31)  POCT Blood Glucose.: 249 mg/dL (22 Sep 2023 08:07)  POCT Blood Glucose.: 262 mg/dL (21 Sep 2023 20:56)  POCT Blood Glucose.: 233 mg/dL (21 Sep 2023 16:44)  POCT Blood Glucose.: 256 mg/dL (21 Sep 2023 12:14)        RADIOLOGY & ADDITIONAL TESTS:    Imaging Personally Reviewed:  YES    Consultant(s) Notes Reviewed:   YES    Care Discussed with Consultants :     Plan of care was discussed with patient and /or primary care giver; all questions and concerns were addressed and care was aligned with patient's wishes.

## 2023-09-22 NOTE — PHYSICAL THERAPY INITIAL EVALUATION ADULT - DIAGNOSIS, PT EVAL
(ICF Model) Pt. present w/ mild deficits in Body Structures/Function (Impairments), incl: Strength, Balance, coordination and sensation leading to deficits in performing the below noted Activities (Limitations).

## 2023-09-22 NOTE — DISCHARGE NOTE PROVIDER - NSDCMRMEDTOKEN_GEN_ALL_CORE_FT
aspirin 81 mg oral tablet: 1 orally  glipiZIDE 10 mg oral tablet: 1 tab(s) orally 2 times a day  Jardiance 10 mg oral tablet: 1 tab(s) orally once a day (in the morning)  losartan-hydrochlorothiazide 50 mg-12.5 mg oral tablet: 1 tab(s) orally once a day  NovoLOG Mix 70/30 FlexPen subcutaneous suspension: 40 unit(s) subcutaneous 2 times a day  omeprazole 40 mg oral delayed release capsule: 1 tab(s) orally once a day  pioglitazone 30 mg oral tablet: 1 tab(s) orally once a day  rosuvastatin 5 mg oral capsule: 1 tab(s) orally once a day (at bedtime)  semaglutide 0.5 mg/0.5 mL (0.5 mg dose) subcutaneous solution: 0.5 milligram(s) subcutaneously once a week   Aspirin Enteric Coated 81 mg oral delayed release tablet: 1 tab(s) orally once a day  atorvastatin 80 mg oral tablet: 1 tab(s) orally once a day (at bedtime)  clopidogrel 75 mg oral tablet: 1 tab(s) orally once a day  glipiZIDE 10 mg oral tablet: 1 tab(s) orally 2 times a day  losartan 50 mg oral tablet: 1 tab(s) orally 2 times a day  omeprazole 40 mg oral delayed release capsule: 1 tab(s) orally once a day  semaglutide 0.5 mg/0.5 mL (0.5 mg dose) subcutaneous solution: 0.5 milligram(s) subcutaneously once a week   Admelog SoloStar 100 units/mL injectable solution: 12 unit(s) injectable 3 times a day (with meals) use 14 units if glucose &gt;150, use 16 units if glucose &gt; 250, use 18 units if glucose &gt;350  Aspirin Enteric Coated 81 mg oral delayed release tablet: 1 tab(s) orally once a day  atorvastatin 80 mg oral tablet: 1 tab(s) orally once a day (at bedtime)  clopidogrel 75 mg oral tablet: 1 tab(s) orally once a day  Freestyle Precision Leon Strips: Test blood sugar four times a day when you see check blood glucose symbol or when symptoms don&#x27;t match Dimitri reading.  insulin glargine 100 units/mL subcutaneous solution: 35 unit(s) subcutaneous once a day  losartan 50 mg oral tablet: 1 tab(s) orally 2 times a day  omeprazole 40 mg oral delayed release capsule: 1 tab(s) orally once a day  pioglitazone 30 mg oral tablet: 1 tab(s) orally once a day  semaglutide 0.5 mg/0.5 mL (0.5 mg dose) subcutaneous solution: 0.5 milligram(s) subcutaneously once a week  you will need outpatient physical therapy: you will need outpatient physical therapy   Admelog SoloStar 100 units/mL injectable solution: 12 unit(s) injectable 3 times a day (with meals) use 14 units if glucose &gt;150, use 16 units if glucose &gt; 250, use 18 units if glucose &gt;350  alcohol swabs: Apply topically to affected area 4 times a day  Aspirin Enteric Coated 81 mg oral delayed release tablet: 1 tab(s) orally once a day  atorvastatin 80 mg oral tablet: 1 tab(s) orally once a day (at bedtime)  clopidogrel 75 mg oral tablet: 1 tab(s) orally once a day  Freestyle Precision Leon Strips: Test blood sugar four times a day when you see check blood glucose symbol or when symptoms don&#x27;t match Dimitri reading.  insulin glargine 100 units/mL subcutaneous solution: 35 unit(s) subcutaneous once a day  Insulin Pen Needles, 4mm: 1 application subcutaneously 4 times a day. ** Use with insulin pen **  lancets: 1 application subcutaneously 4 times a day  losartan 50 mg oral tablet: 1 tab(s) orally 2 times a day  omeprazole 40 mg oral delayed release capsule: 1 tab(s) orally once a day  pioglitazone 30 mg oral tablet: 1 tab(s) orally once a day  semaglutide 0.5 mg/0.5 mL (0.5 mg dose) subcutaneous solution: 0.5 milligram(s) subcutaneously once a week

## 2023-09-22 NOTE — DISCHARGE NOTE PROVIDER - ATTENDING ATTESTATION STATEMENT
Hospitalist Admission History and Physical     NAME:  Alcides Townsend   Age:  79 y.o.  :   1949   MRN:   [id number]  PCP: Tali Mtz MD  Consulting MD:  Treatment Team: Attending Provider: Dee Pritchard MD    No chief complaint on file. HPI:   Patient is a 79 y.o. male who presented to the ED for cc weakness to which he slid off his bed without noted trauma and strong odor to urine. Patient is a poor historian so hard to get detailed history. 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 Olivia Goode, and CVA with residual right sided weakness. Lives by himself. Vitals -     Labs- WBC 20.6, UA consistent with UTI. Lactic acid 4.6. Total bili 2.2.    Past Medical History:   Diagnosis Date    Alcoholic pancreatitis     after mother's death, states he slowed down on his drinking after this episode    Aneurysm ([address]     3 small of aorta, 2.4 x 2.3  cm in diameter is largest    Aneurysm artery, celiac ([address] 2014    Chronic pain     left foot    CVA (cerebral infarction) ,     left sided weakness(TIA) stroke causing weakness    Depression     Diabetes ([address] 2010     typell, Avg fasting ; denies s/s hypo does not know last HA1C    Diabetic ulcer of left great toe ([address]     Enlarged aorta ([address] 2014    arteriomegaly 2.9cm 14     Gout     History of stroke     Residual left hemiparesis    Hypertension     Mixed hyperlipidemia     Obesity     Skin cancer     neck    Stroke ([address]     Unspecified constipation     Unspecified vitamin D deficiency         Past Surgical History:   Procedure Laterality Date    APPENDECTOMY      over 20 yrs ago    HX ADENOIDECTOMY      childhood    HX CATARACT REMOVAL  OD-/OS-    with IOL placement    HX COLONOSCOPY  , ,     with polypectomy    HX ORTHOPAEDIC  2012    L great toe fusion    HX ORTHOPAEDIC  last 2013    Left great toe surgery to total 3    HX ORTHOPAEDIC  11/2013    amp left great toe    HX TONSILLECTOMY      childhood    HX UROLOGICAL  18's    spermatocele removed        Family History   Problem Relation Age of Onset    Cancer Mother     Other Brother         Angiosarcoma, AAA    Cancer Brother     Diabetes Brother     Diabetes Father     Hypertension Father     Other Father        Social History     Social History Narrative    Not on file        Social History     Tobacco Use    Smoking status: Current Some Day Smoker     Years: 13.00     Types: Cigars    Smokeless tobacco: Never Used    Tobacco comment: cigar/ 2 per day   Substance Use Topics    Alcohol use: No     Alcohol/week: 0.0 standard drinks     Comment: occassional        Social History     Substance and Sexual Activity   Drug Use No         Allergies   Allergen Reactions    Penicillin G Hives and Rash    Percocet [Oxycodone-Acetaminophen] Anxiety    Sulfa (Sulfonamide Antibiotics) Other (comments)     Intolerance- unknown       Prior to Admission medications    Medication Sig Start Date End Date Taking? Authorizing Provider   dulaglutide (TRULICITY) 2.25 OP/6.6 mL sub-q pen 0.5 mL by SubCUTAneous route every seven (7) days. 12/16/18   Marc Chadwick MD   TRULICITY 9.30 KK/8.7 mL sub-q pen INJECT 0.5 ML BY SUBCUTANEOUS ROUTE EVERY SEVEN (7) DAYS. 12/15/18   Kusum Reza MD   cyclobenzaprine (FLEXERIL) 10 mg tablet Take 1 Tab by mouth three (3) times daily as needed for Muscle Spasm(s). 6/21/18   Marc Chadwick MD   metFORMIN ER (GLUCOPHAGE XR) 500 mg tablet Take 2 Tabs by mouth two (2) times a day.  6/21/18   Marc Chadwick MD   Blood-Glucose Meter monitoring kit Use as directed  DX: E11.8 6/21/18   Marc Chadwick MD   glucose blood VI test strips (BLOOD GLUCOSE TEST) strip Test twice daily  DX: E11.8 6/21/18   Marc Chadwick MD   Lancets misc Use twice daily   DX: E11.8 6/21/18   Marc Chadwick MD   mirabegron ER (MYRBETRIQ) 50 mg ER tablet Take 1 Tab by mouth daily. 5/17/18   Surjit Mckay MD   atorvastatin (LIPITOR) 40 mg tablet Take 1 Tab by mouth nightly. 5/17/18   Surjit Mckay MD   ezetimibe (ZETIA) 10 mg tablet Take 1 Tab by mouth nightly. 5/17/18   Surjit Mckay MD   C-Owzztuz-N1 Phos-Methyl-B12 [facility]) 3-35-2 mg tab tab Take 1 Tab by mouth daily. Indications: VITAMIN DEFICIENCY PREVENTION 5/17/18   Surjit Mckay MD   linaclotide Sylwia Medico) 145 mcg cap capsule Take 1 Cap by mouth Daily (before breakfast). 5/17/18   Surjit Mckay MD   ibuprofen (MOTRIN) 600 mg tablet Take  by mouth every six (6) hours as needed for Pain. Provider, Historical   ergocalciferol (VITAMIN D2) 50,000 unit capsule Take 50,000 Units by mouth every seven (7) days. Provider, Historical   aspirin (ASPIRIN) 325 mg tablet Take 325 mg by mouth nightly. Provider, Historical   fenofibric acid (TRILIPIX) 135 mg capsule Take 135 mg by mouth nightly. Provider, Historical           Review of Systems    Constitutional:  NAD  Eyes:  no change in visual acuity, no photophobia  Ears, nose, mouth, throat, and face: no  Odynphagia, dysphagia, no thrush or exudate, negative for chronic sinus congestion, recurrent headaches  Respiratory: negative for SOB, hemoptysis or cough  Cardiovascular: negative for CP, palpitations, or PND  Gastrointestinal: negative for abdominal pain, no hematemesis, hematochezia or BRBPR  Genitourinary: strong odor to urine  Integument/breast: negative for skin rash or skin lesions  Hematologic/lymphatic: negative for known bleeding disorder  Musculoskeletal:chronic right sided weakness but increased weakness today that is generalized.    Neurological: confusion  Behavioral/Psych: negative for depression or chronic anxiety,   Endocrine: negative for polydyspia, polyuria or intolerance to heat or cold  Allergic/Immunologic: negative for chronic allergic rhinitis, or known connective tissue I have personally seen and examined the patient. I have collaborated with and supervised the disorder      Objective: There were no vitals taken for this visit. No intake/output data recorded. No intake/output data recorded. Data Review:   Recent Results (from the past 24 hour(s))   CBC WITH AUTOMATED DIFF    Collection Time: 05/11/20  3:07 PM   Result Value Ref Range    WBC 20.6 (H) 4.3 - 11.1 K/uL    RBC 4.82 4.23 - 5.6 M/uL    HGB 16.5 13.6 - 17.2 g/dL    HCT 46.1 41.1 - 50.3 %    MCV 95.6 79.6 - 97.8 FL    MCH 34.2 (H) 26.1 - 32.9 PG    MCHC 35.8 (H) 31.4 - 35.0 g/dL    RDW 11.9 11.9 - 14.6 %    PLATELET 924 001 - 758 K/uL    MPV 10.3 9.4 - 12.3 FL    ABSOLUTE NRBC 0.00 0.0 - 0.2 K/uL    DF AUTOMATED      NEUTROPHILS 90 (H) 43 - 78 %    LYMPHOCYTES 3 (L) 13 - 44 %    MONOCYTES 6 4.0 - 12.0 %    EOSINOPHILS 0 (L) 0.5 - 7.8 %    BASOPHILS 0 0.0 - 2.0 %    IMMATURE GRANULOCYTES 1 0.0 - 5.0 %    ABS. NEUTROPHILS 18.6 (H) 1.7 - 8.2 K/UL    ABS. LYMPHOCYTES 0.6 0.5 - 4.6 K/UL    ABS. MONOCYTES 1.2 0.1 - 1.3 K/UL    ABS. EOSINOPHILS 0.1 0.0 - 0.8 K/UL    ABS. BASOPHILS 0.1 0.0 - 0.2 K/UL    ABS. IMM. GRANS. 0.1 0.0 - 0.5 K/UL   LACTIC ACID    Collection Time: 05/11/20  3:07 PM   Result Value Ref Range    Lactic acid 4.6 (HH) 0.4 - 2.0 MMOL/L   METABOLIC PANEL, COMPREHENSIVE    Collection Time: 05/11/20  3:08 PM   Result Value Ref Range    Sodium 133 (L) 136 - 145 mmol/L    Potassium 4.0 3.5 - 5.1 mmol/L    Chloride 99 98 - 107 mmol/L    CO2 26 21 - 32 mmol/L    Anion gap 8 7 - 16 mmol/L    Glucose 228 (H) 65 - 100 mg/dL    BUN 20 8 - 23 MG/DL    Creatinine 1.15 0.8 - 1.5 MG/DL    GFR est AA >60 >60 ml/min/1.73m2    GFR est non-AA >60 >60 ml/min/1.73m2    Calcium 11.7 (H) 8.3 - 10.4 MG/DL    Bilirubin, total 2.2 (H) 0.2 - 1.1 MG/DL    ALT (SGPT) 28 12 - 65 U/L    AST (SGOT) 22 15 - 37 U/L    Alk.  phosphatase 42 (L) 50 - 136 U/L    Protein, total 7.9 6.3 - 8.2 g/dL    Albumin 4.3 3.2 - 4.6 g/dL    Globulin 3.6 (H) 2.3 - 3.5 g/dL    A-G Ratio 1.2 1.2 - 3.5     CK    Collection Time: 05/11/20  3:08 PM Result Value Ref Range     (H) 21 - 215 U/L   MYOGLOBIN    Collection Time: 05/11/20  3:08 PM   Result Value Ref Range    Myoglobin 1,041 ng/mL   URINALYSIS W/ RFLX MICROSCOPIC    Collection Time: 05/11/20  3:31 PM   Result Value Ref Range    Color EVERT      Appearance CLOUDY      Specific gravity 1.025 (H) 1.001 - 1.023      pH (UA) 5.0 5.0 - 9.0      Protein Negative NEG mg/dL    Glucose 250 mg/dL    Ketone TRACE (A) NEG mg/dL    Bilirubin Negative NEG      Blood MODERATE (A) NEG      Urobilinogen 0.2 0.2 - 1.0 EU/dL    Nitrites Negative NEG      Leukocyte Esterase MODERATE (A) NEG      WBC  0 /hpf    RBC 20-50 0 /hpf    Epithelial cells 0 0 /hpf    Bacteria 4+ (H) 0 /hpf    Casts 5-10 0 /lpf       Physical Exam:     General:  Alert, cooperative, frail appearing. sidebent to the left. Limited ROM in bed. Eyes:  Conjunctivae/corneas clear. Ears:  Normal TMs and external ear canals both ears. Nose: Nares normal. Septum midline. Mouth/Throat: Wearing a mask   Neck:  no JVD. Back:   deferred   Lungs:   Clear to auscultation bilaterally. Heart:  Regular rate and rhythm, S1, S2 normal   Abdomen:   Soft, non-tender. Bowel sounds normal. No masses,  No organomegaly. Extremities: Unable to move his right LE. Can move right UE without any noted deficits though slow with movement. Pulses: 2+ and symmetric all extremities. Skin: Skin color, texture, turgor normal. No rashes or lesions. Poor hygiene. Lymph nodes: Cervical, supraclavicular, and axillary nodes normal.   Neurologic: Can tell me where he is located, the year, and who the president is.       Assessment and Plan     Principal Problem:    Sepsis ([facility] [address] (5/11/2020)    Active Problems:    CVA, old, hemiparesis ([facility] [address] (5/24/2012)      Aneurysm artery, celiac ([facility] [address] (8/6/2014)      Mixed hyperlipidemia ()      Diabetes mellitus with complication ([facility] [address] (7/94/6653)      Urinary incontinence (11/13/2017)      Unsteady gait (12/13/2017) UTI (urinary tract infection) (5/11/2020)    Sepsis secondary to UTI- Ceftriaxone. Sepsis protocol. Urine culture ordered. Lactic acidosis - Secondary to sepsis. Tx as above. DM type II - SS. Check A1C. CVA with chronic right sided weakness= poor hygiene noted and very weak on exam. PPD. Consult PT/OT. Daily high dose ASA    Aortic aneurysm - Repeat US since has been over two years since last US performed. Keep systolic BP <042    Elevated total bilirubin - Likely due to current sepsis. Trend. Hold statin.     DVT prophylaxis - heparin  Signed By: Yaneth Berg DO   May 11, 2020

## 2023-09-22 NOTE — PROGRESS NOTE ADULT - SUBJECTIVE AND OBJECTIVE BOX
Date of Service 09-22-23 @ 09:24    CHIEF COMPLAINT:Patient is a 52y old  Female who presents with a chief complaint of CVA .Pt appears comfortable.    	  REVIEW OF SYSTEMS:  CONSTITUTIONAL: No fever, weight loss, or fatigue  EYES: No eye pain, visual disturbances, or discharge  ENT:  No difficulty hearing, tinnitus, vertigo; No sinus or throat pain  NECK: No pain or stiffness  RESPIRATORY: No cough, wheezing, chills or hemoptysis; No Shortness of Breath  CARDIOVASCULAR: No chest pain, palpitations, passing out, dizziness, or leg swelling  GASTROINTESTINAL: No abdominal or epigastric pain. No nausea, vomiting, or hematemesis; No diarrhea or constipation. No melena or hematochezia.  GENITOURINARY: No dysuria, frequency, hematuria, or incontinence  NEUROLOGICAL: No headaches, memory loss, loss of strength, numbness, or tremors  SKIN: No itching, burning, rashes, or lesions   LYMPH Nodes: No enlarged glands  ENDOCRINE: No heat or cold intolerance; No hair loss  MUSCULOSKELETAL: No joint pain or swelling; No muscle, back, or extremity pain  PSYCHIATRIC: No depression, anxiety, mood swings, or difficulty sleeping  HEME/LYMPH: No easy bruising, or bleeding gums  ALLERGY AND IMMUNOLOGIC: No hives or eczema	        PHYSICAL EXAM:  T(C): 37 (09-22-23 @ 07:28), Max: 37 (09-21-23 @ 20:36)  HR: 100 (09-22-23 @ 09:15) (80 - 100)  BP: 149/60 (09-22-23 @ 09:15) (116/52 - 149/64)  RR: 18 (09-22-23 @ 07:28) (16 - 19)  SpO2: 98% (09-22-23 @ 09:15) (92% - 98%)  Wt(kg): --  I&O's Summary    21 Sep 2023 07:01  -  22 Sep 2023 07:00  --------------------------------------------------------  IN: 500 mL / OUT: 0 mL / NET: 500 mL        Appearance: Normal	  HEENT:   Normal oral mucosa, PERRL, EOMI	  Lymphatic: No lymphadenopathy  Cardiovascular: Normal S1 S2, No JVD, No murmurs, No edema  Respiratory: Lungs clear to auscultation	  Psychiatry: A & O x 3, Mood & affect appropriate  Gastrointestinal:  Soft, Non-tender, + BS	  Skin: No rashes, No ecchymoses, No cyanosis	  Neurologic: Non-focal  Extremities: Normal range of motion, No clubbing, cyanosis or edema  Vascular: Peripheral pulses palpable 2+ bilaterally    MEDICATIONS  (STANDING):  aspirin  chewable 81 milliGRAM(s) Oral daily  atorvastatin 80 milliGRAM(s) Oral at bedtime  clopidogrel Tablet 75 milliGRAM(s) Oral daily  enoxaparin Injectable 40 milliGRAM(s) SubCutaneous every 24 hours  hydrochlorothiazide 12.5 milliGRAM(s) Oral daily  insulin glargine Injectable (LANTUS) 15 Unit(s) SubCutaneous at bedtime  insulin lispro (ADMELOG) corrective regimen sliding scale   SubCutaneous Before meals and at bedtime  insulin lispro Injectable (ADMELOG) 3 Unit(s) SubCutaneous three times a day before meals  losartan 50 milliGRAM(s) Oral daily  pantoprazole    Tablet 40 milliGRAM(s) Oral before breakfast      TELEMETRY: 	nsr    	  	  LABS:	 	    Troponin I, High Sensitivity Result: 3.4 ng/L (09-19 @ 19:40)                            11.1   6.19  )-----------( 405      ( 22 Sep 2023 06:41 )             35.0     09-22    140  |  102  |  18  ----------------------------<  216<H>  3.9   |  30  |  0.74    Ca    8.9      22 Sep 2023 06:41  Phos  2.7     09-20  Mg     1.7     09-20        Lipid Profile: Cholesterol 214  LDL --  HDL 36    Ldl calc 153  Ratio --    HgA1c: A1C with Estimated Average Glucose Result: >15.5: Method: Immunoassay   TSH: Thyroid Stimulating Hormone, Serum: 0.59 uU/mL (09-21 @ 07:02)      	    < from: Transthoracic Echocardiogram (09.20.23 @ 07:17) >  OBSERVATIONS:  Mitral Valve: Normal mitral valve. Trace mitral  regurgitation.  Aortic Root: Normal aortic root.  Aortic Valve: Calcified trileaflet aortic valve with normal  opening.  Left Atrium: LA volume index = 7 cc/m2.  Left Ventricle: Endocardium not well visualized; grossly  normal left ventricular systolic function.   Segmental wall  motion could not be assessed. Normal left ventricular  internal dimensions and wall thicknesses.  Right Heart: Normal right atrium. Normal right ventricular  size and function. There is trace tricuspid regurgitation.  There is mild pulmonic regurgitation.  Pericardium/PleuraNormal pericardium with no pericardial  effusion.  Hemodynamic: RA Pressure is 10 mm Hg. RV systolic pressure  is 33 mm Hg. Agitated saline injection was negative for  intracardiac shunt.  ------------------------------------------------------------------------  CONCLUSIONS:  1. Trace mitral regurgitation.  2. Normal left ventricular internal dimensions and wall  thicknesses.  3. Endocardium not well visualized; grossly normal left  ventricular systolic function.   Segmental wall motion  could not be assessed.  4. Normal right ventricular size and function.    ------------------------------------------------------------------------  Confirmed on  9/21/2023 - 10:52:18 by Murali Harris MD  ------------------------------------------------------------------------    < end of copied text >       Date of Service 09-22-23 @ 09:24    CHIEF COMPLAINT:Patient is a 52y old  Female who presents with a chief complaint of CVA .Pt appears comfortable.    	  REVIEW OF SYSTEMS:  CONSTITUTIONAL: No fever, weight loss, or fatigue  EYES: No eye pain, visual disturbances, or discharge  ENT:  No difficulty hearing, tinnitus, vertigo; No sinus or throat pain  NECK: No pain or stiffness  RESPIRATORY: No cough, wheezing, chills or hemoptysis; No Shortness of Breath  CARDIOVASCULAR: No chest pain, palpitations, passing out, dizziness, or leg swelling  GASTROINTESTINAL: No abdominal or epigastric pain. No nausea, vomiting, or hematemesis; No diarrhea or constipation. No melena or hematochezia.  GENITOURINARY: No dysuria, frequency, hematuria, or incontinence  NEUROLOGICAL: No headaches, memory loss, loss of strength, numbness, or tremors  SKIN: No itching, burning, rashes, or lesions   LYMPH Nodes: No enlarged glands  ENDOCRINE: No heat or cold intolerance; No hair loss  MUSCULOSKELETAL: No joint pain or swelling; No muscle, back, or extremity pain  PSYCHIATRIC: No depression, anxiety, mood swings, or difficulty sleeping  HEME/LYMPH: No easy bruising, or bleeding gums  ALLERGY AND IMMUNOLOGIC: No hives or eczema	        PHYSICAL EXAM:  T(C): 37 (09-22-23 @ 07:28), Max: 37 (09-21-23 @ 20:36)  HR: 100 (09-22-23 @ 09:15) (80 - 100)  BP: 149/60 (09-22-23 @ 09:15) (116/52 - 149/64)  RR: 18 (09-22-23 @ 07:28) (16 - 19)  SpO2: 98% (09-22-23 @ 09:15) (92% - 98%)  Wt(kg): --  I&O's Summary    21 Sep 2023 07:01  -  22 Sep 2023 07:00  --------------------------------------------------------  IN: 500 mL / OUT: 0 mL / NET: 500 mL        Appearance: Normal	  HEENT:   Normal oral mucosa, PERRL, EOMI	  Lymphatic: No lymphadenopathy  Cardiovascular: Normal S1 S2, No JVD, No murmurs, No edema  Respiratory: Lungs clear to auscultation	  Psychiatry: A & O x 3, Mood & affect appropriate  Gastrointestinal:  Soft, Non-tender, + BS	  Skin: No rashes, No ecchymoses, No cyanosis	  Neurologic: Non-focal  Extremities: Normal range of motion, No clubbing, cyanosis or edema  Vascular: Peripheral pulses palpable 2+ bilaterally    MEDICATIONS  (STANDING):  aspirin  chewable 81 milliGRAM(s) Oral daily  atorvastatin 80 milliGRAM(s) Oral at bedtime  clopidogrel Tablet 75 milliGRAM(s) Oral daily  enoxaparin Injectable 40 milliGRAM(s) SubCutaneous every 24 hours  hydrochlorothiazide 12.5 milliGRAM(s) Oral daily  insulin glargine Injectable (LANTUS) 15 Unit(s) SubCutaneous at bedtime  insulin lispro (ADMELOG) corrective regimen sliding scale   SubCutaneous Before meals and at bedtime  insulin lispro Injectable (ADMELOG) 3 Unit(s) SubCutaneous three times a day before meals  losartan 50 milliGRAM(s) Oral daily  pantoprazole    Tablet 40 milliGRAM(s) Oral before breakfast      TELEMETRY: 	nsr    	  	  LABS:	 	    Troponin I, High Sensitivity Result: 3.4 ng/L (09-19 @ 19:40)                            11.1   6.19  )-----------( 405      ( 22 Sep 2023 06:41 )             35.0     09-22    140  |  102  |  18  ----------------------------<  216<H>  3.9   |  30  |  0.74    Ca    8.9      22 Sep 2023 06:41  Phos  2.7     09-20  Mg     1.7     09-20        Lipid Profile: Cholesterol 214  LDL --  HDL 36    Ldl calc 153  Ratio --    HgA1c: A1C with Estimated Average Glucose Result: >15.5: Method: Immunoassay   TSH: Thyroid Stimulating Hormone, Serum: 0.59 uU/mL (09-21 @ 07:02)      	    < from: Transthoracic Echocardiogram (09.20.23 @ 07:17) >  OBSERVATIONS:  Mitral Valve: Normal mitral valve. Trace mitral  regurgitation.  Aortic Root: Normal aortic root.  Aortic Valve: Calcified trileaflet aortic valve with normal  opening.  Left Atrium: LA volume index = 7 cc/m2.  Left Ventricle: Endocardium not well visualized; grossly  normal left ventricular systolic function.   Segmental wall  motion could not be assessed. Normal left ventricular  internal dimensions and wall thicknesses.  Right Heart: Normal right atrium. Normal right ventricular  size and function. There is trace tricuspid regurgitation.  There is mild pulmonic regurgitation.  Pericardium/PleuraNormal pericardium with no pericardial  effusion.  Hemodynamic: RA Pressure is 10 mm Hg. RV systolic pressure  is 33 mm Hg. Agitated saline injection was negative for  intracardiac shunt.  ------------------------------------------------------------------------  CONCLUSIONS:  1. Trace mitral regurgitation.  2. Normal left ventricular internal dimensions and wall  thicknesses.  3. Endocardium not well visualized; grossly normal left  ventricular systolic function.   Segmental wall motion  could not be assessed.  4. Normal right ventricular size and function.    ------------------------------------------------------------------------  Confirmed on  9/21/2023 - 10:52:18 by Murali Harris MD  ------------------------------------------------------------------------    < end of copied text >      < from: MR Head No Cont (09.21.23 @ 12:21) >  ACC: [id number] EXAM:  MR BRAIN   ORDERED BY: SUKHWINDER SO DATE:  09/21/2023          INTERPRETATION:  INDICATION:    Stroke. TIA.  TECHNIQUE:  Multiplanar brain imaging was conducted. T1, T2 and FLAIR   imaging was performed.  In addition, diffusion imaging, diffusion   coefficient assessment (ADC) and T2* was incorporated . No contrast was   administered.  COMPARISON EXAMINATION:  CT 9/19/2023.    FINDINGS:    HEMISPHERES: There are scattered small white matter lesions in both   hemispheres. No diffusion restriction or acute ischemia is noted in   either hemisphere.  VENTRICLES:  midline and normal in size  POSTERIOR FOSSA:  There is a diffusion abnormality in the right   paracentral juan indicative of an acute infarct. However also noted is   T2*shortening suggesting a component of hemorrhage. A cavernoma in this   region cannot be totally excluded as well. Further correlation and   follow-up recommended. Cerebellum is unremarkable.  EXTRA-AXIAL:  No mass or collectionsare depicted.  VASCULATURE:  There is an incidental venous angioma in the right the   parietal region. A cavernoma and venous angioma in the upper juan cannot   be totally excluded on the right.  SINUSES AND MASTOIDS:  Clear.  MISCELLANEOUS:  No orbital or pituitary abnormality noted.  No skull base   lesion suggested.    IMPRESSION:    1)  An acute infarct is noted in the central juan, in conjunction with a   component of T2*shortening/hemorrhage. However a cavernoma in this region   cannot be excluded. Further workup and assessment recommended.  2)  nonspecific scattered small white matter lesions both hemispheres,   with no acute supratentorial infarct or hemorrhage.    --- End of Report ---            JESSICA GRUBBS MD; Attending Radiologist  This document has been electronically signed. Sep 21 2023  2:17PM    < end of copied text >  < from: MR Angio Head No Cont (09.21.23 @ 12:21) >    ACC: [id number] EXAM:  MR ANGIO BRAIN   ORDERED BY: SUKHWINDER ALFORD     PROCEDURE DATE:  09/21/2023          INTERPRETATION:  INDICATION:  Stroke. TIA.    TECHNIQUE:  MR angiography of the brain was performed using three   dimensional time-of-flight (3D-TOF) technique.    FINDINGS:    INTERNAL CAROTID ARTERIES:  Bilaterally patent.  No intraluminal filling   defect or dissection seen.    [place]:  No aneurysm identified.    CEREBRAL ARTERIES:    Anterior cerebral arteries: There is tortuosity of the anterior cerebral   arteries proximally, with no focal stenosis or occlusion identified.    Middle cerebral arteries: Both middle cerebral arteries are patent. No M1   lesion or distal branch occlusion is suggested.    Posterior cerebral arteries: Both posterior cerebral arteries are patent.    VERTEBROBASILAR SYSTEM:  Vertebrobasilar system is patent.    IMPRESSION:  Unremarkable MR angiographic study of the brain. No obvious vascular   malformation noted in the posterior fossa. However CTA imaging of the   head and neck may be considered for further assessment.    --- End of Report ---            JESSICA GRUBBS MD; Attending Radiologist  This document has been electronically signed. Sep 21 2023  2:20PM    < end of copied text >

## 2023-09-22 NOTE — PROGRESS NOTE ADULT - ASSESSMENT
52 year old, Female, who ambulates with a walker (only for past 3 weeks), from home, with PMH of DM, HTN, & HLD.  Presented with worsening of left sided weakness, slurred speech, and dizziness x 3 weeks. On outpatient MR head patient was found to have a subacute infarct in Sandra, and middle cerebellar peduncles.   Admitted for Acute Stroke.

## 2023-09-23 LAB
B2 GLYCOPROT1 AB SER QL: NEGATIVE — SIGNIFICANT CHANGE UP
CARDIOLIPIN AB SER-ACNC: NEGATIVE — SIGNIFICANT CHANGE UP
GLUCOSE BLDC GLUCOMTR-MCNC: 168 MG/DL — HIGH (ref 70–99)
GLUCOSE BLDC GLUCOMTR-MCNC: 175 MG/DL — HIGH (ref 70–99)
GLUCOSE BLDC GLUCOMTR-MCNC: 220 MG/DL — HIGH (ref 70–99)
GLUCOSE BLDC GLUCOMTR-MCNC: 248 MG/DL — HIGH (ref 70–99)
HCYS SERPL-MCNC: 6.3 UMOL/L — SIGNIFICANT CHANGE UP

## 2023-09-23 PROCEDURE — 93880 EXTRACRANIAL BILAT STUDY: CPT | Mod: 26

## 2023-09-23 RX ADMIN — Medication 3 UNIT(S): at 17:16

## 2023-09-23 RX ADMIN — ATORVASTATIN CALCIUM 80 MILLIGRAM(S): 80 TABLET, FILM COATED ORAL at 21:08

## 2023-09-23 RX ADMIN — INSULIN GLARGINE 15 UNIT(S): 100 INJECTION, SOLUTION SUBCUTANEOUS at 21:27

## 2023-09-23 RX ADMIN — ENOXAPARIN SODIUM 40 MILLIGRAM(S): 100 INJECTION SUBCUTANEOUS at 21:09

## 2023-09-23 RX ADMIN — Medication 3 UNIT(S): at 08:08

## 2023-09-23 RX ADMIN — LOSARTAN POTASSIUM 50 MILLIGRAM(S): 100 TABLET, FILM COATED ORAL at 05:59

## 2023-09-23 RX ADMIN — Medication 1: at 17:15

## 2023-09-23 RX ADMIN — Medication 81 MILLIGRAM(S): at 12:17

## 2023-09-23 RX ADMIN — Medication 2: at 12:16

## 2023-09-23 RX ADMIN — PANTOPRAZOLE SODIUM 40 MILLIGRAM(S): 20 TABLET, DELAYED RELEASE ORAL at 05:58

## 2023-09-23 RX ADMIN — Medication 1: at 21:31

## 2023-09-23 RX ADMIN — CLOPIDOGREL BISULFATE 75 MILLIGRAM(S): 75 TABLET, FILM COATED ORAL at 12:17

## 2023-09-23 RX ADMIN — Medication 3 UNIT(S): at 12:16

## 2023-09-23 RX ADMIN — Medication 2: at 08:07

## 2023-09-23 RX ADMIN — LOSARTAN POTASSIUM 50 MILLIGRAM(S): 100 TABLET, FILM COATED ORAL at 17:33

## 2023-09-23 NOTE — PROGRESS NOTE ADULT - SUBJECTIVE AND OBJECTIVE BOX
Patient is a 52y old  Female who presents with a chief complaint of cva (22 Sep 2023 15:12)    pt seen in tele [x], reg med floor [   ], bed [ x ], chair at bedside [   ], a+o x3 [ x], lethargic [  ],    nad [x ]        Allergies    peppers (Rash)  NSAIDs (Short breath)        Vitals    T(F): 98.7 (09-23-23 @ 04:30), Max: 99 (09-22-23 @ 11:00)  HR: 77 (09-23-23 @ 04:30) (75 - 100)  BP: 127/69 (09-23-23 @ 04:30) (112/59 - 149/60)  RR: 18 (09-23-23 @ 04:30) (18 - 18)  SpO2: 95% (09-23-23 @ 04:30) (92% - 98%)  Wt(kg): --  CAPILLARY BLOOD GLUCOSE      POCT Blood Glucose.: 173 mg/dL (22 Sep 2023 21:04)      Labs                          11.1   6.19  )-----------( 405      ( 22 Sep 2023 06:41 )             35.0       09-22    140  |  102  |  18  ----------------------------<  216<H>  3.9   |  30  |  0.74    Ca    8.9      22 Sep 2023 06:41                  Radiology Results      Meds    MEDICATIONS  (STANDING):  aspirin  chewable 81 milliGRAM(s) Oral daily  atorvastatin 80 milliGRAM(s) Oral at bedtime  clopidogrel Tablet 75 milliGRAM(s) Oral daily  enoxaparin Injectable 40 milliGRAM(s) SubCutaneous every 24 hours  insulin glargine Injectable (LANTUS) 15 Unit(s) SubCutaneous at bedtime  insulin lispro (ADMELOG) corrective regimen sliding scale   SubCutaneous Before meals and at bedtime  insulin lispro Injectable (ADMELOG) 3 Unit(s) SubCutaneous three times a day before meals  losartan 50 milliGRAM(s) Oral two times a day  pantoprazole    Tablet 40 milliGRAM(s) Oral before breakfast      MEDICATIONS  (PRN):  acetaminophen     Tablet .. 650 milliGRAM(s) Oral every 6 hours PRN Temp greater or equal to 38C (100.4F), Mild Pain (1 - 3)  melatonin 3 milliGRAM(s) Oral at bedtime PRN Insomnia  ondansetron Injectable 4 milliGRAM(s) IV Push every 8 hours PRN Nausea and/or Vomiting      Physical Exam    Neuro :  no focal deficits  Respiratory: CTA B/L  CV: RRR, S1S2, no murmurs,   Abdominal: Soft, NT, ND +BS,  Extremities: No edema, + peripheral pulses    ASSESSMENT    r/o acute cns patho,   r/o evolving cva,   r/o arrythmia,   uncontrolled dm  h/o early subacute infarct at this point. Mild hemorrhagic conversion along the right jessica-juan, subacute lacunar infarcts,    DM,   HTN,   HLD      PLAN    cont tele,   cont aspirin, statin,   neuro f/u   MRI brain with An acute infarct is noted in the central juan, in conjunction with a   component of T2*shortening/hemorrhage. However a cavernoma in this region   cannot be excluded. Further workup and assessment recommended.  nonspecific scattered small white matter lesions both hemispheres,   with no acute supratentorial infarct or hemorrhage noted above.  MRA head with Unremarkable MR angiographic study of the brain. No obvious vascular   malformation noted in the posterior fossa. However CTA imaging of the head and neck   may be considered for further assessment noted above.   Carotid duplex (CD).    Secondary stroke prevention: ASA 81mg (or ASA 325mg rectally if unable to take po) and Lipitor 40mg HS; Plavix x 3 weeks.   STAT CTH IF the patient has sudden change in mental status or neurological exa  trop x1 neg noted above   cardio f/u   echo with levf >55%, agitated saline injection was negative for intracardiac shunt noted above.  hold outpt oral dm meds,   hgba1c 15.5 noted   lispro ss,   lantus 15 units qhs,   add lispro 3 units ac tid  endo cons   phys tx eval   cont current meds            Patient is a 52y old  Female who presents with a chief complaint of cva (22 Sep 2023 15:12)    pt seen in tele [x], reg med floor [   ], bed [ x ], chair at bedside [   ], a+o x3 [ x], lethargic [  ],    nad [x ]        Allergies    peppers (Rash)  NSAIDs (Short breath)        Vitals    T(F): 98.7 (09-23-23 @ 04:30), Max: 99 (09-22-23 @ 11:00)  HR: 77 (09-23-23 @ 04:30) (75 - 100)  BP: 127/69 (09-23-23 @ 04:30) (112/59 - 149/60)  RR: 18 (09-23-23 @ 04:30) (18 - 18)  SpO2: 95% (09-23-23 @ 04:30) (92% - 98%)  Wt(kg): --  CAPILLARY BLOOD GLUCOSE      POCT Blood Glucose.: 173 mg/dL (22 Sep 2023 21:04)      Labs                          11.1   6.19  )-----------( 405      ( 22 Sep 2023 06:41 )             35.0       09-22    140  |  102  |  18  ----------------------------<  216<H>  3.9   |  30  |  0.74    Ca    8.9      22 Sep 2023 06:41                  Radiology Results      Meds    MEDICATIONS  (STANDING):  aspirin  chewable 81 milliGRAM(s) Oral daily  atorvastatin 80 milliGRAM(s) Oral at bedtime  clopidogrel Tablet 75 milliGRAM(s) Oral daily  enoxaparin Injectable 40 milliGRAM(s) SubCutaneous every 24 hours  insulin glargine Injectable (LANTUS) 15 Unit(s) SubCutaneous at bedtime  insulin lispro (ADMELOG) corrective regimen sliding scale   SubCutaneous Before meals and at bedtime  insulin lispro Injectable (ADMELOG) 3 Unit(s) SubCutaneous three times a day before meals  losartan 50 milliGRAM(s) Oral two times a day  pantoprazole    Tablet 40 milliGRAM(s) Oral before breakfast      MEDICATIONS  (PRN):  acetaminophen     Tablet .. 650 milliGRAM(s) Oral every 6 hours PRN Temp greater or equal to 38C (100.4F), Mild Pain (1 - 3)  melatonin 3 milliGRAM(s) Oral at bedtime PRN Insomnia  ondansetron Injectable 4 milliGRAM(s) IV Push every 8 hours PRN Nausea and/or Vomiting      Physical Exam    Neuro :  no focal deficits  Respiratory: CTA B/L  CV: RRR, S1S2, no murmurs,   Abdominal: Soft, NT, ND +BS,  Extremities: No edema, + peripheral pulses    ASSESSMENT    central pontine CVA   r/o cavernoma  uncontrolled dm  h/o early subacute infarct. Mild hemorrhagic conversion along the right jessica-juan, subacute lacunar infarcts,    DM,   HTN,   HLD      PLAN    cont tele,   MRI brain with An acute infarct is noted in the central juan, in conjunction with a   component of T2*shortening/hemorrhage. However a cavernoma in this region   cannot be excluded. Further workup and assessment recommended.  nonspecific scattered small white matter lesions both hemispheres,   with no acute supratentorial infarct or hemorrhage noted  .  MRA head with Unremarkable MR angiographic study of the brain. No obvious vascular   malformation noted in the posterior fossa. However CTA imaging of the head and neck   may be considered for further assessment noted     Carotid duplex (CD).    neuro f/u   cont ASA 81mg (or ASA 325mg rectally if unable to take po) and Lipitor 40mg HS; Plavix x 3 weeks.    possible cavernoma, MRI brain with contrast for eval, can be done as outpatient  neuro fu in 2 weeks  trop x1 neg noted above   cardio f/u   echo with levf >55%, agitated saline injection was negative for intracardiac shunt noted   f/u hypercoag w/u  hold outpt oral dm meds,   hgba1c 15.5 noted   lispro ss,   lantus 15 units qhs,   cont lispro 3 units ac tid  endo cons   phys tx eval   cont current meds

## 2023-09-23 NOTE — PROGRESS NOTE ADULT - ASSESSMENT
53 y/o F who ambulates with a walker (only for past 3 weeks) with PMH of DM, HTN, HLD who presented with worsening of left sided weakness, slurred speech, and dizziness that has been present for 3 weeks,acute Sandra CVA.  1.Tele monitoring.  2.DM-Insulin.  3.HTN- cozaar 50mg  bid.  4.Lipid d/o-statin.  5.Elevated ESR of 98-R/O hypercoag state. Carotid doppler.  6.Neurology f/u noted, MRI w/ contrast as outpatient-r/o cavernoma.  7.GI and DVT prophylaxis.

## 2023-09-23 NOTE — PROGRESS NOTE ADULT - SUBJECTIVE AND OBJECTIVE BOX
Date of Service 09-23-23 @ 09:39    CHIEF COMPLAINT:Patient is a 52y old  Female who presents with a chief complaint of cva .Pt appears comfortable.    	  REVIEW OF SYSTEMS:  CONSTITUTIONAL: No fever, weight loss, or fatigue  EYES: No eye pain, visual disturbances, or discharge  ENT:  No difficulty hearing, tinnitus, vertigo; No sinus or throat pain  NECK: No pain or stiffness  RESPIRATORY: No cough, wheezing, chills or hemoptysis; No Shortness of Breath  CARDIOVASCULAR: No chest pain, palpitations, passing out, dizziness, or leg swelling  GASTROINTESTINAL: No abdominal or epigastric pain. No nausea, vomiting, or hematemesis; No diarrhea or constipation. No melena or hematochezia.  GENITOURINARY: No dysuria, frequency, hematuria, or incontinence  NEUROLOGICAL: No headaches, memory loss, loss of strength, numbness, or tremors  SKIN: No itching, burning, rashes, or lesions   LYMPH Nodes: No enlarged glands  ENDOCRINE: No heat or cold intolerance; No hair loss  MUSCULOSKELETAL: No joint pain or swelling; No muscle, back, or extremity pain  PSYCHIATRIC: No depression, anxiety, mood swings, or difficulty sleeping  HEME/LYMPH: No easy bruising, or bleeding gums  ALLERGY AND IMMUNOLOGIC: No hives or eczema	      PHYSICAL EXAM:  T(C): 36.8 (09-23-23 @ 08:06), Max: 37.2 (09-22-23 @ 11:00)  HR: 84 (09-23-23 @ 08:06) (75 - 92)  BP: 108/51 (09-23-23 @ 08:06) (108/51 - 135/60)  RR: 16 (09-23-23 @ 08:06) (16 - 18)  SpO2: 97% (09-23-23 @ 08:06) (92% - 98%)  Wt(kg): --  I&O's Summary      Appearance: Normal	  HEENT:   Normal oral mucosa, PERRL, EOMI	  Lymphatic: No lymphadenopathy  Cardiovascular: Normal S1 S2, No JVD, No murmurs, No edema  Respiratory: Lungs clear to auscultation	  Psychiatry: A & O x 3, Mood & affect appropriate  Gastrointestinal:  Soft, Non-tender, + BS	  Skin: No rashes, No ecchymoses, No cyanosis	  Neurologic: Non-focal  Extremities: Normal range of motion, No clubbing, cyanosis or edema  Vascular: Peripheral pulses palpable 2+ bilaterally    MEDICATIONS  (STANDING):  aspirin  chewable 81 milliGRAM(s) Oral daily  atorvastatin 80 milliGRAM(s) Oral at bedtime  clopidogrel Tablet 75 milliGRAM(s) Oral daily  enoxaparin Injectable 40 milliGRAM(s) SubCutaneous every 24 hours  insulin glargine Injectable (LANTUS) 15 Unit(s) SubCutaneous at bedtime  insulin lispro (ADMELOG) corrective regimen sliding scale   SubCutaneous Before meals and at bedtime  insulin lispro Injectable (ADMELOG) 3 Unit(s) SubCutaneous three times a day before meals  losartan 50 milliGRAM(s) Oral two times a day  pantoprazole    Tablet 40 milliGRAM(s) Oral before breakfast      TELEMETRY: nsr	      LABS:	 	                        11.1   6.19  )-----------( 405      ( 22 Sep 2023 06:41 )             35.0     09-22    140  |  102  |  18  ----------------------------<  216<H>  3.9   |  30  |  0.74    Ca    8.9      22 Sep 2023 06:41        Lipid Profile: Cholesterol 214  LDL --  HDL 36    Ldl calc 153    TSH: Thyroid Stimulating Hormone, Serum: 0.59 uU/mL (09-21 @ 07:02)

## 2023-09-24 LAB
ANION GAP SERPL CALC-SCNC: 6 MMOL/L — SIGNIFICANT CHANGE UP (ref 5–17)
BUN SERPL-MCNC: 22 MG/DL — HIGH (ref 7–18)
CALCIUM SERPL-MCNC: 9.5 MG/DL — SIGNIFICANT CHANGE UP (ref 8.4–10.5)
CHLORIDE SERPL-SCNC: 103 MMOL/L — SIGNIFICANT CHANGE UP (ref 96–108)
CO2 SERPL-SCNC: 30 MMOL/L — SIGNIFICANT CHANGE UP (ref 22–31)
CREAT SERPL-MCNC: 0.79 MG/DL — SIGNIFICANT CHANGE UP (ref 0.5–1.3)
EGFR: 90 ML/MIN/1.73M2 — SIGNIFICANT CHANGE UP
GLUCOSE BLDC GLUCOMTR-MCNC: 229 MG/DL — HIGH (ref 70–99)
GLUCOSE BLDC GLUCOMTR-MCNC: 232 MG/DL — HIGH (ref 70–99)
GLUCOSE BLDC GLUCOMTR-MCNC: 241 MG/DL — HIGH (ref 70–99)
GLUCOSE BLDC GLUCOMTR-MCNC: 263 MG/DL — HIGH (ref 70–99)
GLUCOSE SERPL-MCNC: 234 MG/DL — HIGH (ref 70–99)
HCT VFR BLD CALC: 38.1 % — SIGNIFICANT CHANGE UP (ref 34.5–45)
HGB BLD-MCNC: 12.2 G/DL — SIGNIFICANT CHANGE UP (ref 11.5–15.5)
MCHC RBC-ENTMCNC: 28.2 PG — SIGNIFICANT CHANGE UP (ref 27–34)
MCHC RBC-ENTMCNC: 32 GM/DL — SIGNIFICANT CHANGE UP (ref 32–36)
MCV RBC AUTO: 88.2 FL — SIGNIFICANT CHANGE UP (ref 80–100)
NRBC # BLD: 0 /100 WBCS — SIGNIFICANT CHANGE UP (ref 0–0)
PLATELET # BLD AUTO: 467 K/UL — HIGH (ref 150–400)
POTASSIUM SERPL-MCNC: 4.3 MMOL/L — SIGNIFICANT CHANGE UP (ref 3.5–5.3)
POTASSIUM SERPL-SCNC: 4.3 MMOL/L — SIGNIFICANT CHANGE UP (ref 3.5–5.3)
RBC # BLD: 4.32 M/UL — SIGNIFICANT CHANGE UP (ref 3.8–5.2)
RBC # FLD: 12.1 % — SIGNIFICANT CHANGE UP (ref 10.3–14.5)
SODIUM SERPL-SCNC: 139 MMOL/L — SIGNIFICANT CHANGE UP (ref 135–145)
WBC # BLD: 6.41 K/UL — SIGNIFICANT CHANGE UP (ref 3.8–10.5)
WBC # FLD AUTO: 6.41 K/UL — SIGNIFICANT CHANGE UP (ref 3.8–10.5)

## 2023-09-24 RX ORDER — SENNA PLUS 8.6 MG/1
2 TABLET ORAL AT BEDTIME
Refills: 0 | Status: DISCONTINUED | OUTPATIENT
Start: 2023-09-24 | End: 2023-09-26

## 2023-09-24 RX ORDER — POLYETHYLENE GLYCOL 3350 17 G/17G
17 POWDER, FOR SOLUTION ORAL DAILY
Refills: 0 | Status: DISCONTINUED | OUTPATIENT
Start: 2023-09-24 | End: 2023-09-26

## 2023-09-24 RX ORDER — INSULIN GLARGINE 100 [IU]/ML
18 INJECTION, SOLUTION SUBCUTANEOUS AT BEDTIME
Refills: 0 | Status: DISCONTINUED | OUTPATIENT
Start: 2023-09-24 | End: 2023-09-25

## 2023-09-24 RX ORDER — INSULIN LISPRO 100/ML
5 VIAL (ML) SUBCUTANEOUS
Refills: 0 | Status: DISCONTINUED | OUTPATIENT
Start: 2023-09-24 | End: 2023-09-25

## 2023-09-24 RX ADMIN — Medication 2: at 17:11

## 2023-09-24 RX ADMIN — Medication 3 UNIT(S): at 07:58

## 2023-09-24 RX ADMIN — LOSARTAN POTASSIUM 50 MILLIGRAM(S): 100 TABLET, FILM COATED ORAL at 05:45

## 2023-09-24 RX ADMIN — ENOXAPARIN SODIUM 40 MILLIGRAM(S): 100 INJECTION SUBCUTANEOUS at 19:00

## 2023-09-24 RX ADMIN — Medication 3 UNIT(S): at 12:07

## 2023-09-24 RX ADMIN — Medication 3 UNIT(S): at 17:12

## 2023-09-24 RX ADMIN — Medication 2: at 07:58

## 2023-09-24 RX ADMIN — PANTOPRAZOLE SODIUM 40 MILLIGRAM(S): 20 TABLET, DELAYED RELEASE ORAL at 05:44

## 2023-09-24 RX ADMIN — SENNA PLUS 2 TABLET(S): 8.6 TABLET ORAL at 21:29

## 2023-09-24 RX ADMIN — POLYETHYLENE GLYCOL 3350 17 GRAM(S): 17 POWDER, FOR SOLUTION ORAL at 15:19

## 2023-09-24 RX ADMIN — CLOPIDOGREL BISULFATE 75 MILLIGRAM(S): 75 TABLET, FILM COATED ORAL at 12:07

## 2023-09-24 RX ADMIN — Medication 2: at 12:06

## 2023-09-24 RX ADMIN — Medication 3: at 21:30

## 2023-09-24 RX ADMIN — LOSARTAN POTASSIUM 50 MILLIGRAM(S): 100 TABLET, FILM COATED ORAL at 17:11

## 2023-09-24 RX ADMIN — ATORVASTATIN CALCIUM 80 MILLIGRAM(S): 80 TABLET, FILM COATED ORAL at 21:29

## 2023-09-24 RX ADMIN — INSULIN GLARGINE 18 UNIT(S): 100 INJECTION, SOLUTION SUBCUTANEOUS at 21:29

## 2023-09-24 RX ADMIN — Medication 81 MILLIGRAM(S): at 12:07

## 2023-09-24 RX ADMIN — Medication 3 MILLIGRAM(S): at 01:05

## 2023-09-24 NOTE — PROGRESS NOTE ADULT - ASSESSMENT
52 year old, Female, who ambulates with a walker (only for past 3 weeks), from home, with PMH of DM, HTN, & HLD.  Presented with worsening of left sided weakness, slurred speech, and dizziness x 3 weeks. On outpatient MR head patient was found to have a subacute infarct in Sandra, and middle cerebellar peduncles.   Admitted for Acute Stroke. Repeat MR head shows acute infarct in the central sandra.   pending endocrine eval. for uncontrolled dm. Also awaiting MRI brain with IV contrast to evaluate for possible cavernoma.   Once optimized, pt is for outpatient physical therapy.

## 2023-09-24 NOTE — PROGRESS NOTE ADULT - SUBJECTIVE AND OBJECTIVE BOX
Patient is a 52y old  Female who presents with a chief complaint of CVA (24 Sep 2023 09:22)    OVERNIGHT EVENTS: no acute changes.     Pt is alert and awake, sitting up in bed, eating well, comfortable appearing, walking well with walker.   Remains on telemetry - sinus rhythm 90s.     REVIEW OF SYSTEMS:  CONSTITUTIONAL: No fever, chills  ENMT:  No difficulty hearing, no change in vision  NECK: No pain or stiffness  RESPIRATORY: No cough, SOB  CARDIOVASCULAR: No chest pain, palpitations  GASTROINTESTINAL: +constipation. No abdominal pain. No nausea, vomiting, or diarrhea  GENITOURINARY: No dysuria  NEUROLOGICAL: +Headache. no dizziness.   SKIN: No itching, burning, rashes, or lesions   LYMPH NODES: No enlarged glands  ENDOCRINE: No heat or cold intolerance; No hair loss  MUSCULOSKELETAL: No joint pain or swelling; No muscle, back, or extremity pain  PSYCHIATRIC: No depression, anxiety  HEME/LYMPH: No easy bruising, or bleeding gums    T(C): 36.7 (09-24-23 @ 11:00), Max: 37.1 (09-23-23 @ 20:24)  HR: 81 (09-24-23 @ 11:00) (78 - 84)  BP: 123/63 (09-24-23 @ 11:00) (111/56 - 139/66)  RR: 16 (09-24-23 @ 11:00) (16 - 17)  SpO2: 93% (09-24-23 @ 11:00) (93% - 97%)  Wt(kg): --Vital Signs Last 24 Hrs  T(C): 36.7 (24 Sep 2023 11:00), Max: 37.1 (23 Sep 2023 20:24)  T(F): 98.1 (24 Sep 2023 11:00), Max: 98.8 (23 Sep 2023 20:24)  HR: 81 (24 Sep 2023 11:00) (78 - 84)  BP: 123/63 (24 Sep 2023 11:00) (111/56 - 139/66)  BP(mean): --  RR: 16 (24 Sep 2023 11:00) (16 - 17)  SpO2: 93% (24 Sep 2023 11:00) (93% - 97%)    Parameters below as of 24 Sep 2023 11:00  Patient On (Oxygen Delivery Method): room air        MEDICATIONS  (STANDING):  aspirin  chewable 81 milliGRAM(s) Oral daily  atorvastatin 80 milliGRAM(s) Oral at bedtime  clopidogrel Tablet 75 milliGRAM(s) Oral daily  enoxaparin Injectable 40 milliGRAM(s) SubCutaneous every 24 hours  insulin glargine Injectable (LANTUS) 15 Unit(s) SubCutaneous at bedtime  insulin lispro (ADMELOG) corrective regimen sliding scale   SubCutaneous Before meals and at bedtime  insulin lispro Injectable (ADMELOG) 3 Unit(s) SubCutaneous three times a day before meals  losartan 50 milliGRAM(s) Oral two times a day  pantoprazole    Tablet 40 milliGRAM(s) Oral before breakfast    MEDICATIONS  (PRN):  acetaminophen     Tablet .. 650 milliGRAM(s) Oral every 6 hours PRN Temp greater or equal to 38C (100.4F), Mild Pain (1 - 3)  melatonin 3 milliGRAM(s) Oral at bedtime PRN Insomnia  ondansetron Injectable 4 milliGRAM(s) IV Push every 8 hours PRN Nausea and/or Vomiting      PHYSICAL EXAM:  GENERAL: NAD  EYES: clear conjunctiva; EOMI  ENMT: Moist mucous membranes  NECK: Supple, No JVD, Normal thyroid  CHEST/LUNG: Clear to auscultation bilaterally; No rales, rhonchi, wheezing, or rubs  HEART: S1, S2, Regular rate and rhythm  ABDOMEN: Soft, Nontender, Nondistended; Bowel sounds present  NEURO: Alert & Oriented X3  EXTREMITIES: No LE edema, no calf tenderness  LYMPH: No lymphadenopathy noted  SKIN: No rashes or lesions    Consultant(s) Notes Reviewed:  [x ] YES  [ ] NO  Care Discussed with Consultants/Other Providers [ x] YES  [ ] NO    LABS:                        12.2   6.41  )-----------( 467      ( 24 Sep 2023 06:52 )             38.1     09-24    139  |  103  |  22<H>  ----------------------------<  234<H>  4.3   |  30  |  0.79    Ca    9.5      24 Sep 2023 06:52        CAPILLARY BLOOD GLUCOSE      POCT Blood Glucose.: 232 mg/dL (24 Sep 2023 11:23)  POCT Blood Glucose.: 229 mg/dL (24 Sep 2023 07:46)  POCT Blood Glucose.: 175 mg/dL (23 Sep 2023 21:20)  POCT Blood Glucose.: 168 mg/dL (23 Sep 2023 16:40)        Urinalysis Basic - ( 24 Sep 2023 06:52 )    Color: x / Appearance: x / SG: x / pH: x  Gluc: 234 mg/dL / Ketone: x  / Bili: x / Urobili: x   Blood: x / Protein: x / Nitrite: x   Leuk Esterase: x / RBC: x / WBC x   Sq Epi: x / Non Sq Epi: x / Bacteria: x        RADIOLOGY & ADDITIONAL TESTS:  < from: US Duplex Carotid Arteries Complete, Bilateral (09.23.23 @ 10:45) >  IMPRESSION: No significant hemodynamic stenosis of either carotid artery.    < end of copied text >  < from: MR Angio Head No Cont (09.21.23 @ 12:21) >    IMPRESSION:  Unremarkable MR angiographic study of the brain. No obvious vascular   malformation noted in the posterior fossa. However CTA imaging of the   head and neck may be considered for further assessment.    < end of copied text >  < from: MR Head No Cont (09.21.23 @ 12:21) >    IMPRESSION:    1)  An acute infarct is noted in the central juan, in conjunction with a   component of T2*shortening/hemorrhage. However a cavernoma in this region   cannot be excluded. Further workup and assessment recommended.  2)  nonspecific scattered small white matter lesions both hemispheres,   with no acute supratentorial infarct or hemorrhage.    < end of copied text >      Imaging Personally Reviewed:  [ ] YES  [ ] NO

## 2023-09-24 NOTE — PROGRESS NOTE ADULT - SUBJECTIVE AND OBJECTIVE BOX
Patient is a 52y old  Female who presents with a chief complaint of CVA (23 Sep 2023 09:39)    pt seen in tele [x], reg med floor [   ], bed [ x ], chair at bedside [   ], a+o x3 [ x], lethargic [  ],    nad [x ]      Allergies    peppers (Rash)  NSAIDs (Short breath)        Vitals    T(F): 97.7 (09-24-23 @ 04:56), Max: 99.1 (09-23-23 @ 10:53)  HR: 78 (09-24-23 @ 04:56) (78 - 92)  BP: 118/61 (09-24-23 @ 04:56) (108/51 - 139/66)  RR: 17 (09-24-23 @ 04:56) (16 - 17)  SpO2: 94% (09-24-23 @ 04:56) (94% - 97%)  Wt(kg): --  CAPILLARY BLOOD GLUCOSE      POCT Blood Glucose.: 175 mg/dL (23 Sep 2023 21:20)      Labs                          11.1   6.19  )-----------( 405      ( 22 Sep 2023 06:41 )             35.0       09-22    140  |  102  |  18  ----------------------------<  216<H>  3.9   |  30  |  0.74    Ca    8.9      22 Sep 2023 06:41                  Radiology Results      Meds    MEDICATIONS  (STANDING):  aspirin  chewable 81 milliGRAM(s) Oral daily  atorvastatin 80 milliGRAM(s) Oral at bedtime  clopidogrel Tablet 75 milliGRAM(s) Oral daily  enoxaparin Injectable 40 milliGRAM(s) SubCutaneous every 24 hours  insulin glargine Injectable (LANTUS) 15 Unit(s) SubCutaneous at bedtime  insulin lispro (ADMELOG) corrective regimen sliding scale   SubCutaneous Before meals and at bedtime  insulin lispro Injectable (ADMELOG) 3 Unit(s) SubCutaneous three times a day before meals  losartan 50 milliGRAM(s) Oral two times a day  pantoprazole    Tablet 40 milliGRAM(s) Oral before breakfast      MEDICATIONS  (PRN):  acetaminophen     Tablet .. 650 milliGRAM(s) Oral every 6 hours PRN Temp greater or equal to 38C (100.4F), Mild Pain (1 - 3)  melatonin 3 milliGRAM(s) Oral at bedtime PRN Insomnia  ondansetron Injectable 4 milliGRAM(s) IV Push every 8 hours PRN Nausea and/or Vomiting      Physical Exam    Neuro :  no focal deficits  Respiratory: CTA B/L  CV: RRR, S1S2, no murmurs,   Abdominal: Soft, NT, ND +BS,  Extremities: No edema, + peripheral pulses    ASSESSMENT    central pontine CVA   r/o cavernoma  uncontrolled dm  h/o early subacute infarct. Mild hemorrhagic conversion along the right jessica-juan, subacute lacunar infarcts,    DM,   HTN,   HLD      PLAN    cont tele,   MRI brain with An acute infarct is noted in the central juan, in conjunction with a   component of T2*shortening/hemorrhage. However a cavernoma in this region   cannot be excluded. Further workup and assessment recommended.  nonspecific scattered small white matter lesions both hemispheres,   with no acute supratentorial infarct or hemorrhage noted  .  MRA head with Unremarkable MR angiographic study of the brain. No obvious vascular   malformation noted in the posterior fossa. However CTA imaging of the head and neck   may be considered for further assessment noted     Carotid duplex (CD).    neuro f/u   cont ASA 81mg (or ASA 325mg rectally if unable to take po) and Lipitor 40mg HS; Plavix x 3 weeks.    possible cavernoma, MRI brain with contrast for eval, can be done as outpatient  neuro fu in 2 weeks  trop x1 neg noted above   cardio f/u   echo with levf >55%, agitated saline injection was negative for intracardiac shunt noted   f/u hypercoag w/u  hold outpt oral dm meds,   hgba1c 15.5 noted   lispro ss,   lantus 15 units qhs,   cont lispro 3 units ac tid  endo cons   phys tx eval   cont current meds          Patient is a 52y old  Female who presents with a chief complaint of CVA (23 Sep 2023 09:39)    pt seen in tele [x], reg med floor [   ], bed [ x ], chair at bedside [   ], a+o x3 [ x], lethargic [  ],    nad [x ]      Allergies    peppers (Rash)  NSAIDs (Short breath)        Vitals    T(F): 97.7 (09-24-23 @ 04:56), Max: 99.1 (09-23-23 @ 10:53)  HR: 78 (09-24-23 @ 04:56) (78 - 92)  BP: 118/61 (09-24-23 @ 04:56) (108/51 - 139/66)  RR: 17 (09-24-23 @ 04:56) (16 - 17)  SpO2: 94% (09-24-23 @ 04:56) (94% - 97%)  Wt(kg): --  CAPILLARY BLOOD GLUCOSE      POCT Blood Glucose.: 175 mg/dL (23 Sep 2023 21:20)      Labs                          11.1   6.19  )-----------( 405      ( 22 Sep 2023 06:41 )             35.0       09-22    140  |  102  |  18  ----------------------------<  216<H>  3.9   |  30  |  0.74    Ca    8.9      22 Sep 2023 06:41    Anticardiolipin Antibody Level, Total (09.22.23 @ 12:15)   Anticardiolipin Antibody Level, Total: Negative:   Beta 2 Glycoprotein 1 Antibody Screen (09.22.23 @ 12:15)   Beta 2 Glycoprotein 1 Antibody Screen: Negative      Radiology Results      < from: US Duplex Carotid Arteries Complete, Bilateral (09.23.23 @ 10:45) >  IMPRESSION: No significant hemodynamic stenosis of either carotid artery.    < end of copied text >      Meds    MEDICATIONS  (STANDING):  aspirin  chewable 81 milliGRAM(s) Oral daily  atorvastatin 80 milliGRAM(s) Oral at bedtime  clopidogrel Tablet 75 milliGRAM(s) Oral daily  enoxaparin Injectable 40 milliGRAM(s) SubCutaneous every 24 hours  insulin glargine Injectable (LANTUS) 15 Unit(s) SubCutaneous at bedtime  insulin lispro (ADMELOG) corrective regimen sliding scale   SubCutaneous Before meals and at bedtime  insulin lispro Injectable (ADMELOG) 3 Unit(s) SubCutaneous three times a day before meals  losartan 50 milliGRAM(s) Oral two times a day  pantoprazole    Tablet 40 milliGRAM(s) Oral before breakfast      MEDICATIONS  (PRN):  acetaminophen     Tablet .. 650 milliGRAM(s) Oral every 6 hours PRN Temp greater or equal to 38C (100.4F), Mild Pain (1 - 3)  melatonin 3 milliGRAM(s) Oral at bedtime PRN Insomnia  ondansetron Injectable 4 milliGRAM(s) IV Push every 8 hours PRN Nausea and/or Vomiting      Physical Exam    Neuro :  no focal deficits  Respiratory: CTA B/L  CV: RRR, S1S2, no murmurs,   Abdominal: Soft, NT, ND +BS,  Extremities: No edema, + peripheral pulses    ASSESSMENT    central pontine CVA   r/o cavernoma  uncontrolled dm  h/o early subacute infarct. Mild hemorrhagic conversion along the right jessica-juan, subacute lacunar infarcts,    DM,   HTN,   HLD      PLAN    cont tele,   MRI brain with An acute infarct is noted in the central juan, in conjunction with a   component of T2*shortening/hemorrhage. However a cavernoma in this region   cannot be excluded. Further workup and assessment recommended.  nonspecific scattered small white matter lesions both hemispheres,   with no acute supratentorial infarct or hemorrhage noted  .  MRA head with Unremarkable MR angiographic study of the brain. No obvious vascular   malformation noted in the posterior fossa. However CTA imaging of the head and neck   may be considered for further assessment noted     Carotid duplex (CD) with No significant hemodynamic stenosis of either carotid artery noted above.     neuro f/u   cont ASA 81mg (or ASA 325mg rectally if unable to take po) and Lipitor 40mg HS; Plavix x 3 weeks.    possible cavernoma, MRI brain with contrast for eval, can be done as outpatient  neuro fu in 2 weeks  trop x1 neg noted above   cardio f/u   echo with levf >55%, agitated saline injection was negative for intracardiac shunt noted   hypercoag w/u neg noted above  hold outpt oral dm meds,   hgba1c 15.5 noted   lispro ss,   lantus 15 units qhs,   cont lispro 3 units ac tid  endo cons   phys tx eval noted and rec phys tx 2-3x/week x 4 weeks Outpatient PT  cont current meds

## 2023-09-24 NOTE — PROGRESS NOTE ADULT - PROBLEM SELECTOR PLAN 1
- P/w Intermittent worsening of slurred speech, left sided weakness, and dizziness present x 3 weeks.   - S/p MR & MRA head noted above confirmed acute stroke in central juan   - Carotid US negative for stenosis  - On MRI brain, hemorrhage is a "possible cavernoma".  - c/w asa, plavix, statin  - f/u repeat MRI brain with IV contrast   - f/u hypercoagulability workup   - Neuro-Dr. Sanchez following  - PT recommended Outpt PT

## 2023-09-24 NOTE — PROGRESS NOTE ADULT - ASSESSMENT
51 y/o F who ambulates with a walker (only for past 3 weeks) with PMH of DM, HTN, HLD who presented with worsening of left sided weakness, slurred speech, and dizziness that has been present for 3 weeks,acute Sandra CVA.  1.Tele monitoring.  2.DM-Insulin.  3.HTN- cozaar 50mg  bid.  4.Lipid d/o-statin.  5.Elevated ESR of 98-R/O hypercoag state.   6.Neurology f/u noted, MRI w/ contrast as outpatient-r/o cavernoma.  7.GI and DVT prophylaxis.

## 2023-09-24 NOTE — PROGRESS NOTE ADULT - SUBJECTIVE AND OBJECTIVE BOX
Date of Service 09-24-23 @ 09:22    CHIEF COMPLAINT:Patient is a 52y old  Female who presents with a chief complaint of CVA .Pt appears comfortable.    	  REVIEW OF SYSTEMS:  CONSTITUTIONAL: No fever, weight loss, or fatigue  EYES: No eye pain, visual disturbances, or discharge  ENT:  No difficulty hearing, tinnitus, vertigo; No sinus or throat pain  NECK: No pain or stiffness  RESPIRATORY: No cough, wheezing, chills or hemoptysis; No Shortness of Breath  CARDIOVASCULAR: No chest pain, palpitations, passing out, dizziness, or leg swelling  GASTROINTESTINAL: No abdominal or epigastric pain. No nausea, vomiting, or hematemesis; No diarrhea or constipation. No melena or hematochezia.  GENITOURINARY: No dysuria, frequency, hematuria, or incontinence  NEUROLOGICAL: No headaches, memory loss, loss of strength, numbness, or tremors  SKIN: No itching, burning, rashes, or lesions   LYMPH Nodes: No enlarged glands  ENDOCRINE: No heat or cold intolerance; No hair loss  MUSCULOSKELETAL: No joint pain or swelling; No muscle, back, or extremity pain  PSYCHIATRIC: No depression, anxiety, mood swings, or difficulty sleeping  HEME/LYMPH: No easy bruising, or bleeding gums  ALLERGY AND IMMUNOLOGIC: No hives or eczema	      PHYSICAL EXAM:  T(C): 36.5 (09-24-23 @ 07:52), Max: 37.3 (09-23-23 @ 10:53)  HR: 78 (09-24-23 @ 07:52) (78 - 92)  BP: 111/56 (09-24-23 @ 07:52) (111/56 - 139/66)  RR: 16 (09-24-23 @ 07:52) (16 - 17)  SpO2: 97% (09-24-23 @ 07:52) (94% - 97%)  Wt(kg): --  I&O's Summary      Appearance: Normal	  HEENT:   Normal oral mucosa, PERRL, EOMI	  Lymphatic: No lymphadenopathy  Cardiovascular: Normal S1 S2, No JVD, No murmurs, No edema  Respiratory: Lungs clear to auscultation	  Psychiatry: A & O x 3, Mood & affect appropriate  Gastrointestinal:  Soft, Non-tender, + BS	  Skin: No rashes, No ecchymoses, No cyanosis	  Neurologic: Non-focal  Extremities: Normal range of motion, No clubbing, cyanosis or edema  Vascular: Peripheral pulses palpable 2+ bilaterally    MEDICATIONS  (STANDING):  aspirin  chewable 81 milliGRAM(s) Oral daily  atorvastatin 80 milliGRAM(s) Oral at bedtime  clopidogrel Tablet 75 milliGRAM(s) Oral daily  enoxaparin Injectable 40 milliGRAM(s) SubCutaneous every 24 hours  insulin glargine Injectable (LANTUS) 15 Unit(s) SubCutaneous at bedtime  insulin lispro (ADMELOG) corrective regimen sliding scale   SubCutaneous Before meals and at bedtime  insulin lispro Injectable (ADMELOG) 3 Unit(s) SubCutaneous three times a day before meals  losartan 50 milliGRAM(s) Oral two times a day  pantoprazole    Tablet 40 milliGRAM(s) Oral before breakfast      TELEMETRY: nsr	    	  	  LABS:	 	                            12.2   6.41  )-----------( 467      ( 24 Sep 2023 06:52 )             38.1     09-24    139  |  103  |  22<H>  ----------------------------<  234<H>  4.3   |  30  |  0.79    Ca    9.5      24 Sep 2023 06:52      proBNP:   Lipid Profile: Cholesterol 214  LDL --  HDL 36    Ldl calc 153  Ratio --    HgA1c:   TSH: Thyroid Stimulating Hormone, Serum: 0.59 uU/mL (09-21 @ 07:02)      	      < from: US Duplex Carotid Arteries Complete, Bilateral (09.23.23 @ 10:45) >    ACC: [id number] EXAM:  US DPLX CAROTIDS COMPL BI   ORDERED BY:  ANETTE CONKLIN     PROCEDURE DATE:  09/23/2023          INTERPRETATION:  CLINICAL INFORMATION: Cerebral vascular accident.  Acute   pontine infarct.  There is mild atherosclerotic plaque at the    COMPARISON: None available.    TECHNIQUE: Grayscale, color and spectral Doppler examination of both   carotid arteries was performed.    FINDINGS:  There is mild atherosclerotic plaque in the common carotid arteries and   carotid bifurcation/carotid bulbs bilaterally.    No elevated velocities or abnormal waveforms are encountered.    Peak systolic velocities are as follows:    RIGHT:  PROX CCA = 92 cm/s  DIST CCA = 83 cm/s  PROX ICA = 89 cm/s  DIST ICA = 62 cm/s  ECA = 86 cm/s    LEFT:  PROX CCA = 105 cm/s  DIST CCA = 94 cm/s  PROX ICA = 67 cm/s  DIST ICA = 91 cm/s  ECA = 67 cm/s    Antegrade flow is noted within both vertebral arteries.    IMPRESSION: No significant hemodynamic stenosis of either carotid artery.    Measurementof carotid stenosis is based on velocity parameters that   correlate the residual internal carotid diameter with that of the more   distal vessel in accordance with a method such as the [place]   Symptomatic Carotid Endarterectomy Trial (NASCET).    --- End of Report ---            LILIBETH SUAREZ MD; Attending Radiologist  This document has been electronically signed. Sep 23 2023  5:35PM    < end of copied text >

## 2023-09-24 NOTE — PROGRESS NOTE ADULT - PROBLEM SELECTOR PLAN 3
H/o HTN on HCTZ and Losartan  - C/w Losartan 50 mg bid  - BP goal <140/90  - CV-Dr. Sheikh following

## 2023-09-25 LAB
AT III ACT/NOR PPP CHRO: 84 % — LOW (ref 85–135)
AT III AG PPP IA-MCNC: 26 MG/DL — SIGNIFICANT CHANGE UP (ref 19–31)
DRVVT RATIO: 1.03 RATIO — SIGNIFICANT CHANGE UP (ref 0–1.21)
DRVVT SCREEN TO CONFIRM RATIO: SIGNIFICANT CHANGE UP
GLUCOSE BLDC GLUCOMTR-MCNC: 196 MG/DL — HIGH (ref 70–99)
GLUCOSE BLDC GLUCOMTR-MCNC: 199 MG/DL — HIGH (ref 70–99)
GLUCOSE BLDC GLUCOMTR-MCNC: 218 MG/DL — HIGH (ref 70–99)
GLUCOSE BLDC GLUCOMTR-MCNC: 222 MG/DL — HIGH (ref 70–99)
GLUCOSE BLDC GLUCOMTR-MCNC: 271 MG/DL — HIGH (ref 70–99)
PROT C ACT/NOR PPP: 109 % — SIGNIFICANT CHANGE UP (ref 74–150)
PROT S FREE PPP-ACNC: 131 % — SIGNIFICANT CHANGE UP (ref 63–140)

## 2023-09-25 PROCEDURE — 70552 MRI BRAIN STEM W/DYE: CPT | Mod: 26

## 2023-09-25 PROCEDURE — 99223 1ST HOSP IP/OBS HIGH 75: CPT

## 2023-09-25 RX ORDER — INSULIN LISPRO 100/ML
8 VIAL (ML) SUBCUTANEOUS
Refills: 0 | Status: DISCONTINUED | OUTPATIENT
Start: 2023-09-25 | End: 2023-09-26

## 2023-09-25 RX ORDER — INSULIN GLARGINE 100 [IU]/ML
25 INJECTION, SOLUTION SUBCUTANEOUS AT BEDTIME
Refills: 0 | Status: DISCONTINUED | OUTPATIENT
Start: 2023-09-25 | End: 2023-09-26

## 2023-09-25 RX ORDER — INSULIN GLARGINE 100 [IU]/ML
7 INJECTION, SOLUTION SUBCUTANEOUS ONCE
Refills: 0 | Status: COMPLETED | OUTPATIENT
Start: 2023-09-25 | End: 2023-09-25

## 2023-09-25 RX ADMIN — Medication 5 UNIT(S): at 08:26

## 2023-09-25 RX ADMIN — SENNA PLUS 2 TABLET(S): 8.6 TABLET ORAL at 21:07

## 2023-09-25 RX ADMIN — Medication 3 MILLIGRAM(S): at 21:07

## 2023-09-25 RX ADMIN — Medication 8 UNIT(S): at 17:30

## 2023-09-25 RX ADMIN — PANTOPRAZOLE SODIUM 40 MILLIGRAM(S): 20 TABLET, DELAYED RELEASE ORAL at 05:49

## 2023-09-25 RX ADMIN — LOSARTAN POTASSIUM 50 MILLIGRAM(S): 100 TABLET, FILM COATED ORAL at 17:31

## 2023-09-25 RX ADMIN — Medication 2: at 17:30

## 2023-09-25 RX ADMIN — ENOXAPARIN SODIUM 40 MILLIGRAM(S): 100 INJECTION SUBCUTANEOUS at 19:39

## 2023-09-25 RX ADMIN — Medication 81 MILLIGRAM(S): at 12:17

## 2023-09-25 RX ADMIN — Medication 3: at 08:26

## 2023-09-25 RX ADMIN — LOSARTAN POTASSIUM 50 MILLIGRAM(S): 100 TABLET, FILM COATED ORAL at 05:49

## 2023-09-25 RX ADMIN — CLOPIDOGREL BISULFATE 75 MILLIGRAM(S): 75 TABLET, FILM COATED ORAL at 12:17

## 2023-09-25 RX ADMIN — Medication 1: at 21:23

## 2023-09-25 RX ADMIN — POLYETHYLENE GLYCOL 3350 17 GRAM(S): 17 POWDER, FOR SOLUTION ORAL at 12:16

## 2023-09-25 RX ADMIN — Medication 2: at 12:16

## 2023-09-25 RX ADMIN — ATORVASTATIN CALCIUM 80 MILLIGRAM(S): 80 TABLET, FILM COATED ORAL at 21:07

## 2023-09-25 RX ADMIN — Medication 5 UNIT(S): at 12:17

## 2023-09-25 RX ADMIN — INSULIN GLARGINE 7 UNIT(S): 100 INJECTION, SOLUTION SUBCUTANEOUS at 15:58

## 2023-09-25 RX ADMIN — INSULIN GLARGINE 25 UNIT(S): 100 INJECTION, SOLUTION SUBCUTANEOUS at 21:23
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
## 2023-09-25 NOTE — CONSULT NOTE ADULT - ATTENDING COMMENTS
I agree with the resident progress note/outlined plan of care. I have the edited the above note as needed.   My independent findings and conclusions are documented.    52 year old Female with long standing hx of diabetes  presented with its macrovascular complication of CVA.   Patient reports that she was diagnosed with diabetes at the age of 27 when her BS were >400, she was not in DKA. She was only using oral antidiabetes medications, however she started insulin after she gave birth to her daughter at the age of 33.    She has recently seen a PCP who prescribed her multiple diabetes regimen including Ozempic, glipizide, pioglitazone, Jardiance and Insulin 70/30 40 units in AM and PM. Before this regimen, she was using Lantus and lispro.    Inpatient BS are variable, adjust the insulin as mentioned above    Counselled patient extensively that separate basal and nutritional insulins are better insulins to control diabetes rather 70/30 insulins.   Discussed that she should stop Jardiance as its unclear whether she clearly has type 2 diabetes given she was diagnosed at a relatively young age.   The DC plan as above    Diabetes education  Extensive education about diabetes, hyperglycemia, hypoglycemia, glucose self-monitoring with glucometer, glucose target ranges, adherence to diabetes medications, diet, physical activity, importance of following up with outpatient endocrinology/ opthalmology and podiatry appointments discussed.   Explained the definition of HBA1C and target range.   Explained the complications of diabetes including stroke, renal failure and blindness  Reviewed hypoglycemic sign/symptoms and necessary precautions.   Discussed the goal fasting and postprandial BS at home  Patient verbalized understanding and agrees with the plan I agree with the resident progress note/outlined plan of care. I have the edited the above note as needed.   My independent findings and conclusions are documented.    52 year old Female with long standing hx of diabetes  presented with its macrovascular complication of CVA.   Patient reports that she was diagnosed with diabetes at the age of 27 when her BS were >400, she was not in DKA. She was only using oral antidiabetes medications, however she started insulin after she gave birth to her daughter at the age of 33.    She has recently seen a PCP who prescribed her multiple diabetes regimen including Ozempic, glipizide, pioglitazone, Jardiance and Insulin 70/30 40 units in AM and PM. Before this regimen, she was using Lantus and lispro.    Inpatient BS are variable, adjust the insulin as mentioned above    Counselled patient extensively that separate basal and nutritional insulins are better insulins to control diabetes rather 70/30 insulins.   Discussed that she should stop Jardiance as its unclear whether she clearly has type 2 diabetes given she was diagnosed at a relatively young age.   and her mother has hx of type 1 diabetes. Check the OTIS-65, islet cell and Zinc transporter Antibodies    The DC plan as mentioned above    Diabetes education  Extensive education about diabetes, hyperglycemia, hypoglycemia, glucose self-monitoring with glucometer, glucose target ranges, adherence to diabetes medications, diet, physical activity, importance of following up with outpatient endocrinology/ opthalmology and podiatry appointments discussed.   Explained the definition of HBA1C and target range.   Explained the complications of diabetes including stroke, renal failure and blindness  Reviewed hypoglycemic sign/symptoms and necessary precautions.   Discussed the goal fasting and postprandial BS at home  Patient verbalized understanding and agrees with the plan

## 2023-09-25 NOTE — CONSULT NOTE ADULT - NS ATTEST RISK PROBLEM GEN_ALL_CORE FT
Patient is high risk with high level decision making due to uncontrolled diabetes which places patient at high risk for recurrent cardiovascular and cerebrovascular events. Patient with lability of glucose requiring close monitoring and insulin adjustments.

## 2023-09-25 NOTE — PROGRESS NOTE ADULT - SUBJECTIVE AND OBJECTIVE BOX
Patient is a 52y old  Female who presents with a chief complaint of Acute CVA (25 Sep 2023 11:52)    OVERNIGHT EVENTS: no acute changes.     Pt is sitting up in bed, alert and awake, ambulating well, eating well. Comfortable appearing.     REVIEW OF SYSTEMS:  CONSTITUTIONAL: No fever, chills  ENMT:  No difficulty hearing, no change in vision  NECK: No pain or stiffness  RESPIRATORY: No cough, SOB  CARDIOVASCULAR: No chest pain, palpitations  GASTROINTESTINAL: No abdominal pain. No nausea, vomiting, or diarrhea  GENITOURINARY: No dysuria  NEUROLOGICAL: No HA  SKIN: No itching, burning, rashes, or lesions   LYMPH NODES: No enlarged glands  ENDOCRINE: No heat or cold intolerance; No hair loss  MUSCULOSKELETAL: No joint pain or swelling; No muscle, back, or extremity pain  PSYCHIATRIC: No depression, anxiety  HEME/LYMPH: No easy bruising, or bleeding gums    T(C): 37.4 (09-25-23 @ 11:20), Max: 37.4 (09-25-23 @ 11:20)  HR: 83 (09-25-23 @ 11:20) (76 - 84)  BP: 136/62 (09-25-23 @ 11:20) (103/62 - 136/62)  RR: 18 (09-25-23 @ 11:20) (17 - 18)  SpO2: 94% (09-25-23 @ 11:20) (94% - 97%)  Wt(kg): --Vital Signs Last 24 Hrs  T(C): 37.4 (25 Sep 2023 11:20), Max: 37.4 (25 Sep 2023 11:20)  T(F): 99.3 (25 Sep 2023 11:20), Max: 99.3 (25 Sep 2023 11:20)  HR: 83 (25 Sep 2023 11:20) (76 - 84)  BP: 136/62 (25 Sep 2023 11:20) (103/62 - 136/62)  BP(mean): --  RR: 18 (25 Sep 2023 11:20) (17 - 18)  SpO2: 94% (25 Sep 2023 11:20) (94% - 97%)    Parameters below as of 25 Sep 2023 11:20  Patient On (Oxygen Delivery Method): room air        MEDICATIONS  (STANDING):  aspirin  chewable 81 milliGRAM(s) Oral daily  atorvastatin 80 milliGRAM(s) Oral at bedtime  clopidogrel Tablet 75 milliGRAM(s) Oral daily  enoxaparin Injectable 40 milliGRAM(s) SubCutaneous every 24 hours  insulin glargine Injectable (LANTUS) 25 Unit(s) SubCutaneous at bedtime  insulin lispro (ADMELOG) corrective regimen sliding scale   SubCutaneous Before meals and at bedtime  insulin lispro Injectable (ADMELOG) 8 Unit(s) SubCutaneous three times a day before meals  losartan 50 milliGRAM(s) Oral two times a day  pantoprazole    Tablet 40 milliGRAM(s) Oral before breakfast  polyethylene glycol 3350 17 Gram(s) Oral daily  senna 2 Tablet(s) Oral at bedtime    MEDICATIONS  (PRN):  acetaminophen     Tablet .. 650 milliGRAM(s) Oral every 6 hours PRN Temp greater or equal to 38C (100.4F), Mild Pain (1 - 3)  melatonin 3 milliGRAM(s) Oral at bedtime PRN Insomnia  ondansetron Injectable 4 milliGRAM(s) IV Push every 8 hours PRN Nausea and/or Vomiting      PHYSICAL EXAM:  GENERAL: NAD  EYES: clear conjunctiva; EOMI  ENMT: Moist mucous membranes  NECK: Supple, No JVD, Normal thyroid  CHEST/LUNG: Clear to auscultation bilaterally; No rales, rhonchi, wheezing, or rubs  HEART: S1, S2, Regular rate and rhythm  ABDOMEN: Soft, Nontender, Nondistended; Bowel sounds present  NEURO: Alert & Oriented X3  EXTREMITIES: No LE edema, no calf tenderness  LYMPH: No lymphadenopathy noted  SKIN: No rashes or lesions    Consultant(s) Notes Reviewed:  [x ] YES  [ ] NO  Care Discussed with Consultants/Other Providers [ x] YES  [ ] NO    LABS:                        12.2   6.41  )-----------( 467      ( 24 Sep 2023 06:52 )             38.1     09-24    139  |  103  |  22<H>  ----------------------------<  234<H>  4.3   |  30  |  0.79    Ca    9.5      24 Sep 2023 06:52        CAPILLARY BLOOD GLUCOSE      POCT Blood Glucose.: 199 mg/dL (25 Sep 2023 16:25)  POCT Blood Glucose.: 218 mg/dL (25 Sep 2023 11:22)  POCT Blood Glucose.: 271 mg/dL (25 Sep 2023 08:15)  POCT Blood Glucose.: 263 mg/dL (24 Sep 2023 21:15)        Urinalysis Basic - ( 24 Sep 2023 06:52 )    Color: x / Appearance: x / SG: x / pH: x  Gluc: 234 mg/dL / Ketone: x  / Bili: x / Urobili: x   Blood: x / Protein: x / Nitrite: x   Leuk Esterase: x / RBC: x / WBC x   Sq Epi: x / Non Sq Epi: x / Bacteria: x        RADIOLOGY & ADDITIONAL TESTS:  < from: MR Angio Head No Cont (09.21.23 @ 12:21) >    ACC: [id number] EXAM:  MR ANGIO BRAIN   ORDERED BY: SUKHWINDER ALFORD     PROCEDURE DATE:  09/21/2023          INTERPRETATION:  INDICATION:  Stroke. TIA.    TECHNIQUE:  MR angiography of the brain was performed using three   dimensional time-of-flight (3D-TOF) technique.    FINDINGS:    INTERNAL CAROTID ARTERIES:  Bilaterally patent.  No intraluminal filling   defect or dissection seen.    [place]:  No aneurysm identified.    CEREBRAL ARTERIES:    Anterior cerebral arteries: There is tortuosity of the anterior cerebral   arteries proximally, with no focal stenosis or occlusion identified.    Middle cerebral arteries: Both middle cerebral arteries are patent. No M1   lesion or distal branch occlusion is suggested.    Posterior cerebral arteries: Both posterior cerebral arteries are patent.    VERTEBROBASILAR SYSTEM:  Vertebrobasilar system is patent.    IMPRESSION:  Unremarkable MR angiographic study of the brain. No obvious vascular   malformation noted in the posterior fossa. However CTA imaging of the   head and neck may be considered for further assessment.    --- End of Report ---            JESSICA GRUBBS MD; Attending Radiologist  This document has been electronically signed. Sep 21 2023  2:20PM    < end of copied text >  < from: MR Head No Cont (09.21.23 @ 12:21) >    ACC: [id number] EXAM:  MR BRAIN   ORDERED BY: SUKHWINDER ALFORD     PROCEDURE DATE:  09/21/2023          INTERPRETATION:  INDICATION:    Stroke. TIA.  TECHNIQUE:  Multiplanar brain imaging was conducted. T1, T2 and FLAIR   imaging was performed.  In addition, diffusion imaging, diffusion   coefficient assessment (ADC) and T2* was incorporated . No contrast was   administered.  COMPARISON EXAMINATION:  CT 9/19/2023.    FINDINGS:    HEMISPHERES: There are scattered small white matter lesions in both   hemispheres. No diffusion restriction or acute ischemia is noted in   either hemisphere.  VENTRICLES:  midline and normal in size  POSTERIOR FOSSA:  There is a diffusion abnormality in the right   paracentral juan indicative of an acute infarct. However also noted is   T2*shortening suggesting a component of hemorrhage. A cavernoma in this   region cannot be totally excluded as well. Further correlation and   follow-up recommended. Cerebellum is unremarkable.  EXTRA-AXIAL:  No mass or collectionsare depicted.  VASCULATURE:  There is an incidental venous angioma in the right the   parietal region. A cavernoma and venous angioma in the upper juan cannot   be totally excluded on the right.  SINUSES AND MASTOIDS:  Clear.  MISCELLANEOUS:  No orbital or pituitary abnormality noted.  No skull base   lesion suggested.    IMPRESSION:    1)  An acute infarct is noted in the central juan, in conjunction with a   component of T2*shortening/hemorrhage. However a cavernoma in this region   cannot be excluded. Further workup and assessment recommended.  2)  nonspecific scattered small white matter lesions both hemispheres,   with no acute supratentorial infarct or hemorrhage.    --- End of Report ---            JESSICA GRUBBS MD; Attending Radiologist  This document has been electronically signed. Sep 21 2023  2:17PM    < end of copied text >      Imaging Personally Reviewed:  [ ] YES  [ ] NO

## 2023-09-25 NOTE — PROGRESS NOTE ADULT - PROBLEM SELECTOR PLAN 2
- P/w Elevated AHF=028, unknown etiology  - ESR with slight downtrend, not trending daily.  - f/u hypercoagulability workup
- P/w Elevated JAR=000, unknown etiology  - ESR with slight downtrend, not trending daily.  - f/u hypercoagulability workup
- P/w Sinus tachycardia per EKG    - C/w Telemetry  - S/p Echo wnl  - CV-Dr. Sheikh consulted, appreciated
- P/w Sinus tachycardia per EKG    - C/w Telemetry  - S/p Echo wnl  - CV-Dr. Sheikh consulted, appreciated

## 2023-09-25 NOTE — PROGRESS NOTE ADULT - PROBLEM SELECTOR PLAN 4
- C/w Atorvastatin 80 mg qhs
- C/w Atorvastatin 80 mg qhs
H/o HTN on HCTZ and Losartan  - BP stable  - C/w HCTZ and Losartan  - CV-Dr. Sheikh consulted, appreciated
H/o HTN on HCTZ and Losartan  - BP stable  - C/w HCTZ and Losartan  - CV-Dr. Sheikh consulted, appreciated

## 2023-09-25 NOTE — PROGRESS NOTE ADULT - PROBLEM SELECTOR PROBLEM 2
Sinus tachycardia
Elevated erythrocyte sedimentation rate
Elevated erythrocyte sedimentation rate
Sinus tachycardia

## 2023-09-25 NOTE — PROGRESS NOTE ADULT - PROBLEM SELECTOR PLAN 5
- S/p Lipid panel  - C/w Atorvastatin
H/o DM on Glipizide, Ozempic, Jardiance, Pioglitazone, and Novolog 70/30 40U BID  - A1c=15.5, uncontrolled  - C/w Lantus 18 units qhs, HSS and Premeal Admelog 8 units tid    - Endo Dr. Saenz following
H/o DM on Glipizide, Ozempic, Jardiance, Pioglitazone, and Novolog 70/30 40U BID  - A1c=15.5, uncontrolled  - Continue to monitor and adjust insulin accordingly  - C/w Lant, HSS and Premeal  - Endo consult-Dr. Oscar-Dr. Saenz is covering until 9/25 pending-f/u recommendations
- S/p Lipid panel  - C/w Atorvastatin

## 2023-09-25 NOTE — PROGRESS NOTE ADULT - ASSESSMENT
52 year old, Female, who ambulates with a walker (only for past 3 weeks), from home, with PMH of DM, HTN, & HLD.  Presented with worsening of left sided weakness, slurred speech, and dizziness x 3 weeks. On outpatient MR head patient was found to have a subacute infarct in Sandra, and middle cerebellar peduncles.   Admitted for Acute Stroke. Repeat MR head shows acute infarct in the central sandra.   Endocine following for uncontrolled DM. Also awaiting results of MRI brain with IV contrast to evaluate for possible cavernoma.   Once optimized, pt is for outpatient physical therapy.

## 2023-09-25 NOTE — PROGRESS NOTE ADULT - SUBJECTIVE AND OBJECTIVE BOX
Patient is a 52y old  Female who presents with a chief complaint of acute CVA (24 Sep 2023 14:54)    pt seen in tele [x], reg med floor [   ], bed [ x ], chair at bedside [   ], a+o x3 [ x], lethargic [  ],    nad [x ]      Allergies    peppers (Rash)  NSAIDs (Short breath)        Vitals    T(F): 98.6 (09-25-23 @ 05:09), Max: 98.6 (09-25-23 @ 05:09)  HR: 79 (09-25-23 @ 05:09) (78 - 84)  BP: 133/62 (09-25-23 @ 05:09) (111/56 - 133/62)  RR: 17 (09-25-23 @ 05:09) (16 - 17)  SpO2: 95% (09-25-23 @ 05:09) (93% - 97%)  Wt(kg): --  CAPILLARY BLOOD GLUCOSE      POCT Blood Glucose.: 263 mg/dL (24 Sep 2023 21:15)      Labs                          12.2   6.41  )-----------( 467      ( 24 Sep 2023 06:52 )             38.1       09-24    139  |  103  |  22<H>  ----------------------------<  234<H>  4.3   |  30  |  0.79    Ca    9.5      24 Sep 2023 06:52                  Radiology Results      Meds    MEDICATIONS  (STANDING):  aspirin  chewable 81 milliGRAM(s) Oral daily  atorvastatin 80 milliGRAM(s) Oral at bedtime  clopidogrel Tablet 75 milliGRAM(s) Oral daily  enoxaparin Injectable 40 milliGRAM(s) SubCutaneous every 24 hours  insulin glargine Injectable (LANTUS) 18 Unit(s) SubCutaneous at bedtime  insulin lispro (ADMELOG) corrective regimen sliding scale   SubCutaneous Before meals and at bedtime  insulin lispro Injectable (ADMELOG) 5 Unit(s) SubCutaneous three times a day before meals  losartan 50 milliGRAM(s) Oral two times a day  pantoprazole    Tablet 40 milliGRAM(s) Oral before breakfast  polyethylene glycol 3350 17 Gram(s) Oral daily  senna 2 Tablet(s) Oral at bedtime      MEDICATIONS  (PRN):  acetaminophen     Tablet .. 650 milliGRAM(s) Oral every 6 hours PRN Temp greater or equal to 38C (100.4F), Mild Pain (1 - 3)  melatonin 3 milliGRAM(s) Oral at bedtime PRN Insomnia  ondansetron Injectable 4 milliGRAM(s) IV Push every 8 hours PRN Nausea and/or Vomiting      Physical Exam    Neuro :  no focal deficits  Respiratory: CTA B/L  CV: RRR, S1S2, no murmurs,   Abdominal: Soft, NT, ND +BS,  Extremities: No edema, + peripheral pulses    ASSESSMENT    central pontine CVA   r/o cavernoma  uncontrolled dm  h/o early subacute infarct. Mild hemorrhagic conversion along the right jessica-juan, subacute lacunar infarcts,    DM,   HTN,   HLD      PLAN    cont tele,   MRI brain with An acute infarct is noted in the central juan, in conjunction with a   component of T2*shortening/hemorrhage. However a cavernoma in this region   cannot be excluded. Further workup and assessment recommended.  nonspecific scattered small white matter lesions both hemispheres,   with no acute supratentorial infarct or hemorrhage noted  .  MRA head with Unremarkable MR angiographic study of the brain. No obvious vascular   malformation noted in the posterior fossa. However CTA imaging of the head and neck   may be considered for further assessment noted     Carotid duplex (CD) with No significant hemodynamic stenosis of either carotid artery noted above.     neuro f/u   cont ASA 81mg (or ASA 325mg rectally if unable to take po) and Lipitor 40mg HS; Plavix x 3 weeks.    possible cavernoma, MRI brain with contrast for eval, can be done as outpatient  neuro fu in 2 weeks  trop x1 neg noted above   cardio f/u   echo with levf >55%, agitated saline injection was negative for intracardiac shunt noted   hypercoag w/u neg noted above  hold outpt oral dm meds,   hgba1c 15.5 noted   lispro ss,   lantus 15 units qhs,   cont lispro 3 units ac tid  endo cons   phys tx eval noted and rec phys tx 2-3x/week x 4 weeks Outpatient PT  cont current meds          Patient is a 52y old  Female who presents with a chief complaint of acute CVA (24 Sep 2023 14:54)    pt seen in tele [x], reg med floor [   ], bed [ x ], chair at bedside [   ], a+o x3 [ x], lethargic [  ],    nad [x ]      Allergies    peppers (Rash)  NSAIDs (Short breath)        Vitals    T(F): 98.6 (09-25-23 @ 05:09), Max: 98.6 (09-25-23 @ 05:09)  HR: 79 (09-25-23 @ 05:09) (78 - 84)  BP: 133/62 (09-25-23 @ 05:09) (111/56 - 133/62)  RR: 17 (09-25-23 @ 05:09) (16 - 17)  SpO2: 95% (09-25-23 @ 05:09) (93% - 97%)  Wt(kg): --  CAPILLARY BLOOD GLUCOSE      POCT Blood Glucose.: 263 mg/dL (24 Sep 2023 21:15)      Labs                          12.2   6.41  )-----------( 467      ( 24 Sep 2023 06:52 )             38.1       09-24    139  |  103  |  22<H>  ----------------------------<  234<H>  4.3   |  30  |  0.79    Ca    9.5      24 Sep 2023 06:52                  Radiology Results      Meds    MEDICATIONS  (STANDING):  aspirin  chewable 81 milliGRAM(s) Oral daily  atorvastatin 80 milliGRAM(s) Oral at bedtime  clopidogrel Tablet 75 milliGRAM(s) Oral daily  enoxaparin Injectable 40 milliGRAM(s) SubCutaneous every 24 hours  insulin glargine Injectable (LANTUS) 18 Unit(s) SubCutaneous at bedtime  insulin lispro (ADMELOG) corrective regimen sliding scale   SubCutaneous Before meals and at bedtime  insulin lispro Injectable (ADMELOG) 5 Unit(s) SubCutaneous three times a day before meals  losartan 50 milliGRAM(s) Oral two times a day  pantoprazole    Tablet 40 milliGRAM(s) Oral before breakfast  polyethylene glycol 3350 17 Gram(s) Oral daily  senna 2 Tablet(s) Oral at bedtime      MEDICATIONS  (PRN):  acetaminophen     Tablet .. 650 milliGRAM(s) Oral every 6 hours PRN Temp greater or equal to 38C (100.4F), Mild Pain (1 - 3)  melatonin 3 milliGRAM(s) Oral at bedtime PRN Insomnia  ondansetron Injectable 4 milliGRAM(s) IV Push every 8 hours PRN Nausea and/or Vomiting      Physical Exam    Neuro :  no focal deficits  Respiratory: CTA B/L  CV: RRR, S1S2, no murmurs,   Abdominal: Soft, NT, ND +BS,  Extremities: No edema, + peripheral pulses    ASSESSMENT    central pontine CVA   r/o cavernoma  uncontrolled dm  h/o early subacute infarct. Mild hemorrhagic conversion along the right jessica-juan, subacute lacunar infarcts,    DM,   HTN,   HLD      PLAN    cont tele,   MRI brain with An acute infarct is noted in the central juan, in conjunction with a   component of T2*shortening/hemorrhage. However a cavernoma in this region   cannot be excluded. Further workup and assessment recommended.  nonspecific scattered small white matter lesions both hemispheres,   with no acute supratentorial infarct or hemorrhage noted  .  MRA head with Unremarkable MR angiographic study of the brain. No obvious vascular   malformation noted in the posterior fossa. However CTA imaging of the head and neck   may be considered for further assessment noted     Carotid duplex (CD) with No significant hemodynamic stenosis of either carotid artery noted above.     neuro f/u   cont ASA 81mg (or ASA 325mg rectally if unable to take po) and Lipitor 40mg HS; Plavix x 3 weeks.    possible cavernoma, MRI brain with contrast for eval,  neuro fu in 2 weeks  trop x1 neg noted above   cardio f/u   echo with levf >55%, agitated saline injection was negative for intracardiac shunt noted   hypercoag w/u neg noted above  hold outpt oral dm meds,   hgba1c 15.5 noted   lispro ss,   lantus 15 units qhs,   cont lispro 5 units ac tid  endo cons   phys tx eval noted and rec phys tx 2-3x/week x 4 weeks Outpatient PT  cont current meds   d/c plan pending mr brain w/ contrast

## 2023-09-25 NOTE — PROGRESS NOTE ADULT - PROBLEM SELECTOR PROBLEM 3
Elevated erythrocyte sedimentation rate
Hypertension
Hypertension
Elevated erythrocyte sedimentation rate

## 2023-09-26 ENCOUNTER — TRANSCRIPTION ENCOUNTER (OUTPATIENT)
Age: 53
End: 2023-09-26

## 2023-09-26 VITALS
SYSTOLIC BLOOD PRESSURE: 138 MMHG | OXYGEN SATURATION: 95 % | TEMPERATURE: 99 F | DIASTOLIC BLOOD PRESSURE: 53 MMHG | RESPIRATION RATE: 16 BRPM | HEART RATE: 87 BPM

## 2023-09-26 LAB
APCR PPP: 2.86 RATIO — SIGNIFICANT CHANGE UP
GLUCOSE BLDC GLUCOMTR-MCNC: 230 MG/DL — HIGH (ref 70–99)
GLUCOSE BLDC GLUCOMTR-MCNC: 284 MG/DL — HIGH (ref 70–99)

## 2023-09-26 PROCEDURE — 84100 ASSAY OF PHOSPHORUS: CPT

## 2023-09-26 PROCEDURE — 84484 ASSAY OF TROPONIN QUANT: CPT

## 2023-09-26 PROCEDURE — 93880 EXTRACRANIAL BILAT STUDY: CPT

## 2023-09-26 PROCEDURE — 81241 F5 GENE: CPT

## 2023-09-26 PROCEDURE — 85307 ASSAY ACTIVATED PROTEIN C: CPT

## 2023-09-26 PROCEDURE — 99285 EMERGENCY DEPT VISIT HI MDM: CPT

## 2023-09-26 PROCEDURE — 81240 F2 GENE: CPT

## 2023-09-26 PROCEDURE — 36415 COLL VENOUS BLD VENIPUNCTURE: CPT

## 2023-09-26 PROCEDURE — 85306 CLOT INHIBIT PROT S FREE: CPT

## 2023-09-26 PROCEDURE — 70552 MRI BRAIN STEM W/DYE: CPT

## 2023-09-26 PROCEDURE — 93306 TTE W/DOPPLER COMPLETE: CPT

## 2023-09-26 PROCEDURE — 85730 THROMBOPLASTIN TIME PARTIAL: CPT

## 2023-09-26 PROCEDURE — 82550 ASSAY OF CK (CPK): CPT

## 2023-09-26 PROCEDURE — 85027 COMPLETE CBC AUTOMATED: CPT

## 2023-09-26 PROCEDURE — 82962 GLUCOSE BLOOD TEST: CPT

## 2023-09-26 PROCEDURE — 80061 LIPID PANEL: CPT

## 2023-09-26 PROCEDURE — 85610 PROTHROMBIN TIME: CPT

## 2023-09-26 PROCEDURE — 85301 ANTITHROMBIN III ANTIGEN: CPT

## 2023-09-26 PROCEDURE — 86146 BETA-2 GLYCOPROTEIN ANTIBODY: CPT

## 2023-09-26 PROCEDURE — 83690 ASSAY OF LIPASE: CPT

## 2023-09-26 PROCEDURE — 70551 MRI BRAIN STEM W/O DYE: CPT

## 2023-09-26 PROCEDURE — 85613 RUSSELL VIPER VENOM DILUTED: CPT

## 2023-09-26 PROCEDURE — 84443 ASSAY THYROID STIM HORMONE: CPT

## 2023-09-26 PROCEDURE — 82607 VITAMIN B-12: CPT

## 2023-09-26 PROCEDURE — 71045 X-RAY EXAM CHEST 1 VIEW: CPT

## 2023-09-26 PROCEDURE — 83090 ASSAY OF HOMOCYSTEINE: CPT

## 2023-09-26 PROCEDURE — 80048 BASIC METABOLIC PNL TOTAL CA: CPT

## 2023-09-26 PROCEDURE — 85300 ANTITHROMBIN III ACTIVITY: CPT

## 2023-09-26 PROCEDURE — 83735 ASSAY OF MAGNESIUM: CPT

## 2023-09-26 PROCEDURE — 99232 SBSQ HOSP IP/OBS MODERATE 35: CPT | Mod: GC

## 2023-09-26 PROCEDURE — 86147 CARDIOLIPIN ANTIBODY EA IG: CPT

## 2023-09-26 PROCEDURE — 0225U NFCT DS DNA&RNA 21 SARSCOV2: CPT

## 2023-09-26 PROCEDURE — 70544 MR ANGIOGRAPHY HEAD W/O DYE: CPT

## 2023-09-26 PROCEDURE — 85652 RBC SED RATE AUTOMATED: CPT

## 2023-09-26 PROCEDURE — 85025 COMPLETE CBC W/AUTO DIFF WBC: CPT

## 2023-09-26 PROCEDURE — 84702 CHORIONIC GONADOTROPIN TEST: CPT

## 2023-09-26 PROCEDURE — 83036 HEMOGLOBIN GLYCOSYLATED A1C: CPT

## 2023-09-26 PROCEDURE — A9585: CPT

## 2023-09-26 PROCEDURE — 93005 ELECTROCARDIOGRAM TRACING: CPT

## 2023-09-26 PROCEDURE — 80053 COMPREHEN METABOLIC PANEL: CPT

## 2023-09-26 PROCEDURE — 85303 CLOT INHIBIT PROT C ACTIVITY: CPT

## 2023-09-26 PROCEDURE — 70450 CT HEAD/BRAIN W/O DYE: CPT | Mod: MA

## 2023-09-26 PROCEDURE — 84439 ASSAY OF FREE THYROXINE: CPT

## 2023-09-26 PROCEDURE — 97162 PT EVAL MOD COMPLEX 30 MIN: CPT

## 2023-09-26 RX ORDER — ASPIRIN/CALCIUM CARB/MAGNESIUM 324 MG
1 TABLET ORAL
Qty: 30 | Refills: 3
Start: 2023-09-26 | End: 2024-01-23

## 2023-09-26 RX ORDER — ASPIRIN/CALCIUM CARB/MAGNESIUM 324 MG
1 TABLET ORAL
Refills: 0 | DISCHARGE

## 2023-09-26 RX ORDER — ISOPROPYL ALCOHOL, BENZOCAINE .7; .06 ML/ML; ML/ML
0 SWAB TOPICAL
Qty: 100 | Refills: 1
Start: 2023-09-26

## 2023-09-26 RX ORDER — ROSUVASTATIN CALCIUM 5 MG/1
1 TABLET ORAL
Refills: 0 | DISCHARGE

## 2023-09-26 RX ORDER — LOSARTAN POTASSIUM 100 MG/1
1 TABLET, FILM COATED ORAL
Qty: 60 | Refills: 0
Start: 2023-09-26 | End: 2023-10-25

## 2023-09-26 RX ORDER — EMPAGLIFLOZIN 10 MG/1
1 TABLET, FILM COATED ORAL
Qty: 0 | Refills: 0 | DISCHARGE

## 2023-09-26 RX ORDER — PIOGLITAZONE HYDROCHLORIDE 15 MG/1
1 TABLET ORAL
Refills: 0 | DISCHARGE

## 2023-09-26 RX ORDER — INSULIN ASPART 100 [IU]/ML
40 INJECTION, SUSPENSION SUBCUTANEOUS
Refills: 0 | DISCHARGE

## 2023-09-26 RX ORDER — INSULIN GLARGINE 100 [IU]/ML
35 INJECTION, SOLUTION SUBCUTANEOUS
Qty: 1 | Refills: 0
Start: 2023-09-26 | End: 2023-10-25

## 2023-09-26 RX ORDER — LOSARTAN/HYDROCHLOROTHIAZIDE 100MG-25MG
1 TABLET ORAL
Refills: 0 | DISCHARGE

## 2023-09-26 RX ORDER — INSULIN LISPRO 100/ML
12 VIAL (ML) SUBCUTANEOUS
Qty: 1 | Refills: 0
Start: 2023-09-26 | End: 2023-10-25

## 2023-09-26 RX ORDER — ATORVASTATIN CALCIUM 80 MG/1
1 TABLET, FILM COATED ORAL
Qty: 30 | Refills: 0
Start: 2023-09-26 | End: 2023-10-25

## 2023-09-26 RX ORDER — CLOPIDOGREL BISULFATE 75 MG/1
1 TABLET, FILM COATED ORAL
Qty: 15 | Refills: 0
Start: 2023-09-26 | End: 2023-10-10

## 2023-09-26 RX ADMIN — POLYETHYLENE GLYCOL 3350 17 GRAM(S): 17 POWDER, FOR SOLUTION ORAL at 12:06

## 2023-09-26 RX ADMIN — Medication 81 MILLIGRAM(S): at 12:00

## 2023-09-26 RX ADMIN — Medication 8 UNIT(S): at 12:00

## 2023-09-26 RX ADMIN — LOSARTAN POTASSIUM 50 MILLIGRAM(S): 100 TABLET, FILM COATED ORAL at 05:19

## 2023-09-26 RX ADMIN — Medication 2: at 08:05

## 2023-09-26 RX ADMIN — Medication 3: at 11:59

## 2023-09-26 RX ADMIN — Medication 8 UNIT(S): at 08:10

## 2023-09-26 RX ADMIN — CLOPIDOGREL BISULFATE 75 MILLIGRAM(S): 75 TABLET, FILM COATED ORAL at 12:11

## 2023-09-26 RX ADMIN — PANTOPRAZOLE SODIUM 40 MILLIGRAM(S): 20 TABLET, DELAYED RELEASE ORAL at 05:18

## 2023-09-26 NOTE — DISCHARGE NOTE NURSING/CASE MANAGEMENT/SOCIAL WORK - NSDCPEFALRISK_GEN_ALL_CORE
For information on Fall & Injury Prevention, visit: [email or web address] OR  [email or web address] OR  https://www.cdc.gov/steadi/patient.html

## 2023-09-26 NOTE — PROGRESS NOTE ADULT - PROVIDER SPECIALTY LIST ADULT
Cardiology
Internal Medicine
Neurology
Neurology
Internal Medicine
Internal Medicine
Cardiology
Cardiology
Endocrinology
Cardiology
Cardiology
Internal Medicine

## 2023-09-26 NOTE — PROGRESS NOTE ADULT - SUBJECTIVE AND OBJECTIVE BOX
Patient is a 52y old  Female who presents with a chief complaint of acute CVA (25 Sep 2023 17:16)    pt seen in tele [x], reg med floor [   ], bed [ x ], chair at bedside [   ], a+o x3 [ x], lethargic [  ],    nad [x ]        Allergies    peppers (Rash)  NSAIDs (Short breath)        Vitals    T(F): 98.8 (09-26-23 @ 04:59), Max: 99.3 (09-25-23 @ 11:20)  HR: 78 (09-26-23 @ 04:59) (76 - 87)  BP: 113/53 (09-26-23 @ 04:59) (103/62 - 153/66)  RR: 18 (09-26-23 @ 04:59) (17 - 18)  SpO2: 96% (09-26-23 @ 04:59) (94% - 97%)  Wt(kg): --  CAPILLARY BLOOD GLUCOSE      POCT Blood Glucose.: 196 mg/dL (25 Sep 2023 21:15)      Labs                          12.2   6.41  )-----------( 467      ( 24 Sep 2023 06:52 )             38.1       09-24    139  |  103  |  22<H>  ----------------------------<  234<H>  4.3   |  30  |  0.79    Ca    9.5      24 Sep 2023 06:52                  Radiology Results      Meds    MEDICATIONS  (STANDING):  aspirin  chewable 81 milliGRAM(s) Oral daily  atorvastatin 80 milliGRAM(s) Oral at bedtime  clopidogrel Tablet 75 milliGRAM(s) Oral daily  enoxaparin Injectable 40 milliGRAM(s) SubCutaneous every 24 hours  insulin glargine Injectable (LANTUS) 25 Unit(s) SubCutaneous at bedtime  insulin lispro (ADMELOG) corrective regimen sliding scale   SubCutaneous Before meals and at bedtime  insulin lispro Injectable (ADMELOG) 8 Unit(s) SubCutaneous three times a day before meals  losartan 50 milliGRAM(s) Oral two times a day  pantoprazole    Tablet 40 milliGRAM(s) Oral before breakfast  polyethylene glycol 3350 17 Gram(s) Oral daily  senna 2 Tablet(s) Oral at bedtime      MEDICATIONS  (PRN):  acetaminophen     Tablet .. 650 milliGRAM(s) Oral every 6 hours PRN Temp greater or equal to 38C (100.4F), Mild Pain (1 - 3)  melatonin 3 milliGRAM(s) Oral at bedtime PRN Insomnia  ondansetron Injectable 4 milliGRAM(s) IV Push every 8 hours PRN Nausea and/or Vomiting      Physical Exam    Neuro :  no focal deficits  Respiratory: CTA B/L  CV: RRR, S1S2, no murmurs,   Abdominal: Soft, NT, ND +BS,  Extremities: No edema, + peripheral pulses    ASSESSMENT    central pontine CVA   r/o cavernoma  uncontrolled dm  h/o early subacute infarct. Mild hemorrhagic conversion along the right jessica-juan, subacute lacunar infarcts,    DM,   HTN,   HLD      PLAN    cont tele,   MRI brain with An acute infarct is noted in the central juan, in conjunction with a   component of T2*shortening/hemorrhage. However a cavernoma in this region   cannot be excluded. Further workup and assessment recommended.  nonspecific scattered small white matter lesions both hemispheres,   with no acute supratentorial infarct or hemorrhage noted  .  MRA head with Unremarkable MR angiographic study of the brain. No obvious vascular   malformation noted in the posterior fossa. However CTA imaging of the head and neck   may be considered for further assessment noted     Carotid duplex (CD) with No significant hemodynamic stenosis of either carotid artery noted above.     neuro f/u   cont ASA 81mg (or ASA 325mg rectally if unable to take po) and Lipitor 40mg HS; Plavix x 3 weeks.    possible cavernoma, MRI brain with contrast for eval,  neuro fu in 2 weeks  trop x1 neg noted above   cardio f/u   echo with levf >55%, agitated saline injection was negative for intracardiac shunt noted   hypercoag w/u neg noted above  hold outpt oral dm meds,   hgba1c 15.5 noted   lispro ss,   lantus 15 units qhs,   cont lispro 5 units ac tid  endo cons   phys tx eval noted and rec phys tx 2-3x/week x 4 weeks Outpatient PT  cont current meds   d/c plan pending mr brain w/ contrast       Patient is a 52y old  Female who presents with a chief complaint of acute CVA (25 Sep 2023 17:16)    pt seen in tele [x], reg med floor [   ], bed [ x ], chair at bedside [   ], a+o x3 [ x], lethargic [  ],    nad [x ]        Allergies    peppers (Rash)  NSAIDs (Short breath)        Vitals    T(F): 98.8 (09-26-23 @ 04:59), Max: 99.3 (09-25-23 @ 11:20)  HR: 78 (09-26-23 @ 04:59) (76 - 87)  BP: 113/53 (09-26-23 @ 04:59) (103/62 - 153/66)  RR: 18 (09-26-23 @ 04:59) (17 - 18)  SpO2: 96% (09-26-23 @ 04:59) (94% - 97%)  Wt(kg): --  CAPILLARY BLOOD GLUCOSE      POCT Blood Glucose.: 196 mg/dL (25 Sep 2023 21:15)      Labs                          12.2   6.41  )-----------( 467      ( 24 Sep 2023 06:52 )             38.1       09-24    139  |  103  |  22<H>  ----------------------------<  234<H>  4.3   |  30  |  0.79    Ca    9.5      24 Sep 2023 06:52    Antithrombin III Antigen (09.22.23 @ 12:15)   Antithrombin III Antigen: 26 mg/dL  Anticardiolipin Antibody Level, Total (09.22.23 @ 12:15)   Anticardiolipin Antibody Level, Total: Negative: Method: EIA   Beta 2 Glycoprotein 1 Antibody Screen (09.22.23 @ 12:15)   Beta 2 Glycoprotein 1 Antibody Screen: Negative   Dilute Cameron's Viper Venom Time (09.22.23 @ 12:15)   DRVVT Ratio: 1.03 Ratio  DRVVT Interpretation: LA NEG:  Antithrombin III Assay with Reflex (09.22.23 @ 12:15)   Antithrombin III Assay with Reflex: 84     Protein S Free Activity Assay (09.22.23 @ 12:15)   Protein S Free Activity Assay: 131   Protein C Functional Assay with Reflex (09.22.23 @ 12:15)   Protein C Functional Assay with Reflex: 109        Radiology Results    < from: MR Head w/ IV Cont (09.25.23 @ 17:10) >  IMPRESSION:  Evolving infarct in the juan with petechial hemorrhage without   significant change. Underlying cavernoma not excluded at this time.    Nonspecific T2 prolongation signal normalities bilateral brachium pontis   and periventricular/subcortical white matter as described above    < end of copied text >        Meds    MEDICATIONS  (STANDING):  aspirin  chewable 81 milliGRAM(s) Oral daily  atorvastatin 80 milliGRAM(s) Oral at bedtime  clopidogrel Tablet 75 milliGRAM(s) Oral daily  enoxaparin Injectable 40 milliGRAM(s) SubCutaneous every 24 hours  insulin glargine Injectable (LANTUS) 25 Unit(s) SubCutaneous at bedtime  insulin lispro (ADMELOG) corrective regimen sliding scale   SubCutaneous Before meals and at bedtime  insulin lispro Injectable (ADMELOG) 8 Unit(s) SubCutaneous three times a day before meals  losartan 50 milliGRAM(s) Oral two times a day  pantoprazole    Tablet 40 milliGRAM(s) Oral before breakfast  polyethylene glycol 3350 17 Gram(s) Oral daily  senna 2 Tablet(s) Oral at bedtime      MEDICATIONS  (PRN):  acetaminophen     Tablet .. 650 milliGRAM(s) Oral every 6 hours PRN Temp greater or equal to 38C (100.4F), Mild Pain (1 - 3)  melatonin 3 milliGRAM(s) Oral at bedtime PRN Insomnia  ondansetron Injectable 4 milliGRAM(s) IV Push every 8 hours PRN Nausea and/or Vomiting      Physical Exam    Neuro :  no focal deficits  Respiratory: CTA B/L  CV: RRR, S1S2, no murmurs,   Abdominal: Soft, NT, ND +BS,  Extremities: No edema, + peripheral pulses    ASSESSMENT    central pontine CVA   r/o cavernoma  uncontrolled dm  h/o early subacute infarct. Mild hemorrhagic conversion along the right jessica-juan, subacute lacunar infarcts,    DM,   HTN,   HLD      PLAN    cont tele,   MRI brain with An acute infarct is noted in the central juan, in conjunction with a   component of T2*shortening/hemorrhage. However a cavernoma in this region   cannot be excluded. Further workup and assessment recommended.  nonspecific scattered small white matter lesions both hemispheres,   with no acute supratentorial infarct or hemorrhage noted  .  MRA head with Unremarkable MR angiographic study of the brain. No obvious vascular   malformation noted in the posterior fossa. However CTA imaging of the head and neck   may be considered for further assessment noted     Carotid duplex (CD) with No significant hemodynamic stenosis of either carotid artery noted above.     neuro f/u   cont ASA 81mg (or ASA 325mg rectally if unable to take po) and Lipitor 40mg HS; Plavix x 3 weeks.    MRI brain with contrast with Evolving infarct in the juan with petechial hemorrhage without   significant change. Underlying cavernoma not excluded at this time. Nonspecific T2 prolongation   signal normalities bilateral brachium pontis and periventricular/subcortical white matter as   described noted above   neuro fu in 2 weeks  trop x1 neg noted above   cardio f/u   echo with levf >55%, agitated saline injection was negative for intracardiac shunt noted   hypercoag w/u neg noted above  hgba1c 15.5 noted   lispro ss,   lantus 25 units qhs,   cont lispro 8 units ac tid  endo  f/u   Lantus solostar pen - dose to be decided at time of discharge  Lispro Kwik pen dose to be decided at time of discharge  C/w Ozempic and Pioglitazone  Discontinue insulin 70/30,  Glipizide and Jardiance  phys tx eval noted and rec phys tx 2-3x/week x 4 weeks Outpatient PT  cont current meds   d/c plan pending neuro clr

## 2023-09-26 NOTE — DISCHARGE NOTE NURSING/CASE MANAGEMENT/SOCIAL WORK - NSSCNAMETXT_GEN_ALL_CORE
[facility] (formerly [facility]) [phone] [employer] [facility]

## 2023-09-26 NOTE — PROGRESS NOTE ADULT - ATTENDING COMMENTS
I agree with the resident progress note/outlined plan of care. I have the edited the above note as needed.

## 2023-09-26 NOTE — PROGRESS NOTE ADULT - SUBJECTIVE AND OBJECTIVE BOX
Date of Service 09-26-23 @ 08:39    CHIEF COMPLAINT:Patient is a 52y old  Female who presents with a chief complaint of acute CVA .Pt appears comfortable.    	  REVIEW OF SYSTEMS:  CONSTITUTIONAL: No fever, weight loss, or fatigue  EYES: No eye pain, visual disturbances, or discharge  ENT:  No difficulty hearing, tinnitus, vertigo; No sinus or throat pain  NECK: No pain or stiffness  RESPIRATORY: No cough, wheezing, chills or hemoptysis; No Shortness of Breath  CARDIOVASCULAR: No chest pain, palpitations, passing out, dizziness, or leg swelling  GASTROINTESTINAL: No abdominal or epigastric pain. No nausea, vomiting, or hematemesis; No diarrhea or constipation. No melena or hematochezia.  GENITOURINARY: No dysuria, frequency, hematuria, or incontinence  NEUROLOGICAL: No headaches, memory loss, loss of strength, numbness, or tremors  SKIN: No itching, burning, rashes, or lesions   LYMPH Nodes: No enlarged glands  ENDOCRINE: No heat or cold intolerance; No hair loss  MUSCULOSKELETAL: No joint pain or swelling; No muscle, back, or extremity pain  PSYCHIATRIC: No depression, anxiety, mood swings, or difficulty sleeping  HEME/LYMPH: No easy bruising, or bleeding gums  ALLERGY AND IMMUNOLOGIC: No hives or eczema	      PHYSICAL EXAM:  T(C): 37 (09-26-23 @ 08:09), Max: 37.4 (09-25-23 @ 11:20)  HR: 82 (09-26-23 @ 08:09) (78 - 87)  BP: 102/46 (09-26-23 @ 08:09) (102/46 - 153/66)  RR: 16 (09-26-23 @ 08:09) (16 - 18)  SpO2: 97% (09-26-23 @ 08:09) (94% - 97%)  Wt(kg): --  I&O's Summary      Appearance: Normal	  HEENT:   Normal oral mucosa, PERRL, EOMI	  Lymphatic: No lymphadenopathy  Cardiovascular: Normal S1 S2, No JVD, No murmurs, No edema  Respiratory: Lungs clear to auscultation	  Psychiatry: A & O x 3, Mood & affect appropriate  Gastrointestinal:  Soft, Non-tender, + BS	  Skin: No rashes, No ecchymoses, No cyanosis	  Neurologic: Non-focal  Extremities: Normal range of motion, No clubbing, cyanosis or edema  Vascular: Peripheral pulses palpable 2+ bilaterally    MEDICATIONS  (STANDING):  aspirin  chewable 81 milliGRAM(s) Oral daily  atorvastatin 80 milliGRAM(s) Oral at bedtime  clopidogrel Tablet 75 milliGRAM(s) Oral daily  enoxaparin Injectable 40 milliGRAM(s) SubCutaneous every 24 hours  insulin glargine Injectable (LANTUS) 25 Unit(s) SubCutaneous at bedtime  insulin lispro (ADMELOG) corrective regimen sliding scale   SubCutaneous Before meals and at bedtime  insulin lispro Injectable (ADMELOG) 8 Unit(s) SubCutaneous three times a day before meals  losartan 50 milliGRAM(s) Oral two times a day  pantoprazole    Tablet 40 milliGRAM(s) Oral before breakfast  polyethylene glycol 3350 17 Gram(s) Oral daily  senna 2 Tablet(s) Oral at bedtime      TELEMETRY: 	  nsr    	  LABS:	 	    Lipid Profile: Cholesterol 214  LDL --  HDL 36    Ldl calc 153     TSH: Thyroid Stimulating Hormone, Serum: 0.59 uU/mL (09-21 @ 07:02)      	    < from: MR Head w/ IV Cont (09.25.23 @ 17:10) >  ACC: [id number] EXAM:  MR BRAIN IC   ORDERED BY: RAN EGAN     PROCEDURE DATE:  09/25/2023          INTERPRETATION:  CLINICAL STATEMENT: Acute pontine CVA    TECHNIQUE: MRI of the brain was performed with and without gadolinium.   7.5 cc administered    COMPARISON: Noncontrast brain MRI 9/21/2023. CT head 9/19/2023    FINDINGS:  Evolving infarct juan noted without significant change. Associated   gradient susceptibility effect again noted which could be related to   petechial hemorrhage. There is no abnormal enhancement in this region.    Nonspecific high T2 signals noted in the bilateral brachium pontis   unchanged.  There are additional nonspecific T2 prolongation signal abnormalities in   the periventricular white matter. Differentialdiagnosis includes   gliosis, mild chronic microvascular ischemic changes, demyelinating   disease, other vascular, inflammatory/infectious conditions.    Incidental developmental venous anomaly right parietal region noted    There is no midline shift. There is no extra-axial fluid collection.    There is no hydrocephalus.  There is no new acute infarct.    The visualized paranasal sinuses are well aerated.        IMPRESSION:  Evolving infarct in the juan with petechial hemorrhage without   significant change. Underlying cavernoma not excluded at this time.    Nonspecific T2 prolongation signal normalities bilateral brachium pontis   and periventricular/subcortical white matter as described above    --- End of Report ---            SIMI KEE; Attending Radiologist  This document has been electronically signed. Sep 26 2023  7:50AM    < end of copied text >

## 2023-09-26 NOTE — PROGRESS NOTE ADULT - ASSESSMENT
53 y/o F who ambulates with a walker (only for past 3 weeks) with PMH of DM, HTN, HLD who presented with worsening of left sided weakness, slurred speech, and dizziness that has been present for 3 weeks,acute Sandra CVA.  1.Tele monitoring.  2.DM-Insulin.  3.HTN- cozaar 50mg  bid.  4.Lipid d/o-statin.  5.Elevated ESR of 98-R/O hypercoag state.   6.Neurology f/u noted, MRI w/ contrast as outpatient-r/o cavernoma.  7.GI and DVT prophylaxis.

## 2023-09-26 NOTE — PROGRESS NOTE ADULT - SUBJECTIVE AND OBJECTIVE BOX
Subjective:  Chart Notes, Work list Manager, and fingersticks reviewed.    Review of Systems:  Constitutional: No fever  Eyes: No blurry vision  Neuro: No tremors  HEENT: No pain  Cardiovascular: No chest pain, palpitations  Respiratory: No SOB, no cough  GI: No nausea, vomiting, abdominal pain  : No dysuria  Skin: no rash  Psych: no depression  Endocrine: no polyuria, polydipsia      ALL OTHER SYSTEMS REVIEWED AND NEGATIVE        MEDICATIONS  (STANDING):  aspirin  chewable 81 milliGRAM(s) Oral daily  atorvastatin 80 milliGRAM(s) Oral at bedtime  clopidogrel Tablet 75 milliGRAM(s) Oral daily  enoxaparin Injectable 40 milliGRAM(s) SubCutaneous every 24 hours  insulin glargine Injectable (LANTUS) 25 Unit(s) SubCutaneous at bedtime  insulin lispro (ADMELOG) corrective regimen sliding scale   SubCutaneous Before meals and at bedtime  insulin lispro Injectable (ADMELOG) 8 Unit(s) SubCutaneous three times a day before meals  losartan 50 milliGRAM(s) Oral two times a day  pantoprazole    Tablet 40 milliGRAM(s) Oral before breakfast  polyethylene glycol 3350 17 Gram(s) Oral daily  senna 2 Tablet(s) Oral at bedtime    MEDICATIONS  (PRN):  acetaminophen     Tablet .. 650 milliGRAM(s) Oral every 6 hours PRN Temp greater or equal to 38C (100.4F), Mild Pain (1 - 3)  melatonin 3 milliGRAM(s) Oral at bedtime PRN Insomnia  ondansetron Injectable 4 milliGRAM(s) IV Push every 8 hours PRN Nausea and/or Vomiting      PHYSICAL EXAM:  VITALS: T(C): 37 (09-26-23 @ 11:49)  T(F): 98.6 (09-26-23 @ 11:49), Max: 98.8 (09-25-23 @ 20:50)  HR: 87 (09-26-23 @ 11:49) (78 - 87)  BP: 138/53 (09-26-23 @ 11:49) (102/46 - 153/66)  RR:  (16 - 18)  SpO2:  (94% - 97%)  Wt(kg): --  GENERAL: NAD,   HEENT:  mucous membranes normal  THYROID: Normal size, no palpable nodules  RESPIRATORY: Clear to auscultation bilaterally; No rales, rhonchi, wheezing  CARDIOVASCULAR: Regular rate and rhythm; No murmurs; no peripheral edema  GI: Soft, nontender, non distended, +ve abdominal obesity  EXTREMITIES: +ve peripheral pulses, -ve pedal edema  SKIN:  intact, No rashes or lesions  PSYCH: Alert and oriented x 3      Lab results:    CAPILLARY BLOOD GLUCOSE      POCT Blood Glucose.: 284 mg/dL (26 Sep 2023 11:28)  POCT Blood Glucose.: 230 mg/dL (26 Sep 2023 07:36)  POCT Blood Glucose.: 196 mg/dL (25 Sep 2023 21:15)  POCT Blood Glucose.: 222 mg/dL (25 Sep 2023 17:28)  POCT Blood Glucose.: 199 mg/dL (25 Sep 2023 16:25)    09-24    139  |  103  |  22<H>  ----------------------------<  234<H>  4.3   |  30  |  0.79    eGFR: 90    Ca    9.5      09-24            Assessment and Plan:  52y Female, who ambulates with a walker (only for past 3 weeks), from home, with PMH of DM, HTN, & HLD. Presented with worsening of left sided weakness, slurred speech, and dizziness x 3 weeks. On outpatient MR head patient was found to have a subacute infarct in Sandra, and middle cerebellar peduncles. Admitted for management of Acute Stroke. Repeat MR head shows acute infarct in the central sandra. Endocrinology is consulted for management of  uncontrolled DM.      1) Poorly controlled type 2 diabetes:  2) Cerebrovascular accident  A1C is poorly controlled likely due to non- compliance with insulins and dietary indiscretion  Patient has fair appetite  Fasting and postprandial Blood sugars are above goal  Adjust the insulin as below        In patient Recommendations:  - Basal Insulin:   Glargine  (Lantus) units once daily    - Nutritional Insulin:  Lispro (Admelog) units with breakfast, hold if NPO or eating <50% of meals      - Correctional (Sliding) Insulin:  Low Lispro (Admelog) sliding scale with meals and bedtime    - Oral Medications:  None in the hospital      Discharge recommendation:  Lantus solostar pen - 35units at bedtime  Lispro Kwik pen 12units three times a day with meals, Hold if NPO or eating <50% of meal    14 units if blood glucose more than 150    16 units if blood glucose more than 250    18 units if blood glucose more than 350   C/w Ozempic and Pioglitazone (home dose)  Discontinue insulin 70/30,  Glipizide and Jardiance                 Subjective:  Chart Notes, Work list Manager, and fingersticks reviewed.    Review of Systems:  Constitutional: No fever  Eyes: No blurry vision  Neuro: No tremors  HEENT: No pain  Cardiovascular: No chest pain, palpitations  Respiratory: No SOB, no cough  GI: No nausea, vomiting, abdominal pain  : No dysuria  Skin: no rash  Psych: no depression  Endocrine: no polyuria, polydipsia      ALL OTHER SYSTEMS REVIEWED AND NEGATIVE        MEDICATIONS  (STANDING):  aspirin  chewable 81 milliGRAM(s) Oral daily  atorvastatin 80 milliGRAM(s) Oral at bedtime  clopidogrel Tablet 75 milliGRAM(s) Oral daily  enoxaparin Injectable 40 milliGRAM(s) SubCutaneous every 24 hours  insulin glargine Injectable (LANTUS) 25 Unit(s) SubCutaneous at bedtime  insulin lispro (ADMELOG) corrective regimen sliding scale   SubCutaneous Before meals and at bedtime  insulin lispro Injectable (ADMELOG) 8 Unit(s) SubCutaneous three times a day before meals  losartan 50 milliGRAM(s) Oral two times a day  pantoprazole    Tablet 40 milliGRAM(s) Oral before breakfast  polyethylene glycol 3350 17 Gram(s) Oral daily  senna 2 Tablet(s) Oral at bedtime    MEDICATIONS  (PRN):  acetaminophen     Tablet .. 650 milliGRAM(s) Oral every 6 hours PRN Temp greater or equal to 38C (100.4F), Mild Pain (1 - 3)  melatonin 3 milliGRAM(s) Oral at bedtime PRN Insomnia  ondansetron Injectable 4 milliGRAM(s) IV Push every 8 hours PRN Nausea and/or Vomiting      PHYSICAL EXAM:  VITALS: T(C): 37 (09-26-23 @ 11:49)  T(F): 98.6 (09-26-23 @ 11:49), Max: 98.8 (09-25-23 @ 20:50)  HR: 87 (09-26-23 @ 11:49) (78 - 87)  BP: 138/53 (09-26-23 @ 11:49) (102/46 - 153/66)  RR:  (16 - 18)  SpO2:  (94% - 97%)  Wt(kg): --  GENERAL: NAD,   HEENT:  mucous membranes normal  THYROID: Normal size, no palpable nodules  RESPIRATORY: Clear to auscultation bilaterally; No rales, rhonchi, wheezing  CARDIOVASCULAR: Regular rate and rhythm; No murmurs; no peripheral edema  GI: Soft, nontender, non distended, +ve abdominal obesity  EXTREMITIES: +ve peripheral pulses, -ve pedal edema  SKIN:  intact, No rashes or lesions  PSYCH: Alert and oriented x 3      Lab results:    CAPILLARY BLOOD GLUCOSE      POCT Blood Glucose.: 284 mg/dL (26 Sep 2023 11:28)  POCT Blood Glucose.: 230 mg/dL (26 Sep 2023 07:36)  POCT Blood Glucose.: 196 mg/dL (25 Sep 2023 21:15)  POCT Blood Glucose.: 222 mg/dL (25 Sep 2023 17:28)  POCT Blood Glucose.: 199 mg/dL (25 Sep 2023 16:25)    09-24    139  |  103  |  22<H>  ----------------------------<  234<H>  4.3   |  30  |  0.79    eGFR: 90    Ca    9.5      09-24            Assessment and Plan:  52y Female, who ambulates with a walker (only for past 3 weeks), from home, with PMH of DM, HTN, & HLD. Presented with worsening of left sided weakness, slurred speech, and dizziness x 3 weeks. On outpatient MR head patient was found to have a subacute infarct in Sandra, and middle cerebellar peduncles. Admitted for management of Acute Stroke. Repeat MR head shows acute infarct in the central sandra. Endocrinology is consulted for management of  uncontrolled DM.      1) Poorly controlled type 2 diabetes:  2) Cerebrovascular accident  A1C is poorly controlled likely due to non- compliance with insulins and dietary indiscretion  Patient has fair appetite  Fasting and postprandial Blood sugars are above goal  Adjust the insulin as below        In patient Recommendations:  - Basal Insulin:   Glargine  (Lantus) 25 units once daily    - Nutritional Insulin:  Lispro (Admelog) 8 units with breakfast, hold if NPO or eating <50% of meals    - Correctional (Sliding) Insulin:  Low Lispro (Admelog) sliding scale with meals and bedtime    - Oral Medications:  None in the hospital      Discharge recommendation:  Lantus solostar pen - 35units once at bedtime  Lispro Kwik pen 12units three times a day with meals, Hold if NPO or eating <50% of meal    10 units if blood glucose between     14 units if blood glucose more than 150    16 units if blood glucose more than 250    18 units if blood glucose more than 350   C/w Ozempic and Pioglitazone (home dose)  Discontinue insulin 70/30,  Glipizide and Jardiance                 Subjective:  Chart Notes, Work list Manager, and fingersticks reviewed. Patient reports of eating well, denies any major complaints    Review of Systems:  Constitutional: No fever  Eyes: No blurry vision  Neuro: No tremors  HEENT: No pain  Cardiovascular: No chest pain, palpitations  Respiratory: No SOB, no cough  GI: No nausea, vomiting, abdominal pain  Endocrine: no polyuria, polydipsia    Medications (Standing):  aspirin  chewable 81 milliGRAM(s) Oral daily  atorvastatin 80 milliGRAM(s) Oral at bedtime  clopidogrel Tablet 75 milliGRAM(s) Oral daily  enoxaparin Injectable 40 milliGRAM(s) SubCutaneous every 24 hours  insulin glargine Injectable (LANTUS) 25 Unit(s) SubCutaneous at bedtime  insulin lispro (ADMELOG) corrective regimen sliding scale   SubCutaneous Before meals and at bedtime  insulin lispro Injectable (ADMELOG) 8 Unit(s) SubCutaneous three times a day before meals  losartan 50 milliGRAM(s) Oral two times a day  pantoprazole    Tablet 40 milliGRAM(s) Oral before breakfast  polyethylene glycol 3350 17 Gram(s) Oral daily  senna 2 Tablet(s) Oral at bedtime    Medications  (PRN):  acetaminophen     Tablet .. 650 milliGRAM(s) Oral every 6 hours PRN Temp greater or equal to 38C (100.4F), Mild Pain (1 - 3)  melatonin 3 milliGRAM(s) Oral at bedtime PRN Insomnia  ondansetron Injectable 4 milliGRAM(s) IV Push every 8 hours PRN Nausea and/or Vomiting    Physical examination:  VITALS: T(C): 37 (09-26-23 @ 11:49)  T(F): 98.6 (09-26-23 @ 11:49), Max: 98.8 (09-25-23 @ 20:50)  HR: 87 (09-26-23 @ 11:49) (78 - 87)  BP: 138/53 (09-26-23 @ 11:49) (102/46 - 153/66)  RR:  (16 - 18)  SpO2:  (94% - 97%)    GENERAL: NAD,   HEENT:  mucous membranes normal  THYROID: Normal size, no palpable nodules  RESPIRATORY: Clear to auscultation bilaterally; No rales, rhonchi, wheezing  CARDIOVASCULAR: Regular rate and rhythm; No murmurs; no peripheral edema  GI: Soft, nontender, non distended, +ve abdominal obesity  EXTREMITIES: +ve peripheral pulses, -ve pedal edema  SKIN:  intact, No rashes or lesions  PSYCH: Alert and oriented x 3      Lab results:  Capillary Blood Glucose:  POCT Blood Glucose.: 284 mg/dL (26 Sep 2023 11:28)  POCT Blood Glucose.: 230 mg/dL (26 Sep 2023 07:36)  POCT Blood Glucose.: 196 mg/dL (25 Sep 2023 21:15)  POCT Blood Glucose.: 222 mg/dL (25 Sep 2023 17:28)  POCT Blood Glucose.: 199 mg/dL (25 Sep 2023 16:25)    09-24  139  |  103  |  22<H>  ----------------------------<  234<H>  4.3   |  30  |  0.79  eGFR: 90  Ca    9.5      09-24    Assessment and Plan:  52y Female, who ambulates with a walker (only for past 3 weeks), from home, with PMH of DM, HTN, & HLD. Presented with worsening of left sided weakness, slurred speech, and dizziness x 3 weeks. On outpatient MR head patient was found to have a subacute infarct in Sandra, and middle cerebellar peduncles. Admitted for management of Acute Stroke. Repeat MR head shows acute infarct in the central sandra. Endocrinology is consulted for management of  uncontrolled DM.    1) Poorly controlled type 2 diabetes:  2) Cerebrovascular accident    A1C is poorly controlled likely due to non- compliance with insulins and dietary indiscretion  Patient has fair appetite  Fasting and postprandial Blood sugars are above goal  Adjust the insulin as below    In patient Recommendations:  - Basal Insulin:   Glargine  (Lantus) 25 units once daily    - Nutritional Insulin:  Lispro (Admelog) 8 units with breakfast, hold if NPO or eating <50% of meals    - Correctional (Sliding) Insulin:  Low Lispro (Admelog) sliding scale with meals and bedtime    - Oral Medications:  None in the hospital      Discharge recommendation:  Lantus solostar pen - 35units once at bedtime  Lispro Kwik pen 12units three times a day with meals, Hold if NPO or eating <50% of meal    10 units if blood glucose between     14 units if blood glucose more than 150    16 units if blood glucose more than 250    18 units if blood glucose more than 350   C/w Ozempic and Pioglitazone (home dose)  Discontinue insulin 70/30,  Glipizide and Jardiance    Please ensure patient has all insulin supplies including glucometer, test strips, lancets, alcohol pads, insulin PEN, PEN needles on discharge              Subjective:  Chart Notes, Work list Manager, and fingersticks reviewed. Patient reports of eating well, denies any major complaints    Review of Systems:  Constitutional: No fever  Eyes: No blurry vision  Neuro: No tremors  HEENT: No pain  Cardiovascular: No chest pain, palpitations  Respiratory: No SOB, no cough  GI: No nausea, vomiting, abdominal pain  Endocrine: no polyuria, polydipsia    Medications (Standing):  aspirin  chewable 81 milliGRAM(s) Oral daily  atorvastatin 80 milliGRAM(s) Oral at bedtime  clopidogrel Tablet 75 milliGRAM(s) Oral daily  enoxaparin Injectable 40 milliGRAM(s) SubCutaneous every 24 hours  insulin glargine Injectable (LANTUS) 25 Unit(s) SubCutaneous at bedtime  insulin lispro (ADMELOG) corrective regimen sliding scale   SubCutaneous Before meals and at bedtime  insulin lispro Injectable (ADMELOG) 8 Unit(s) SubCutaneous three times a day before meals  losartan 50 milliGRAM(s) Oral two times a day  pantoprazole    Tablet 40 milliGRAM(s) Oral before breakfast  polyethylene glycol 3350 17 Gram(s) Oral daily  senna 2 Tablet(s) Oral at bedtime    Medications  (PRN):  acetaminophen     Tablet .. 650 milliGRAM(s) Oral every 6 hours PRN Temp greater or equal to 38C (100.4F), Mild Pain (1 - 3)  melatonin 3 milliGRAM(s) Oral at bedtime PRN Insomnia  ondansetron Injectable 4 milliGRAM(s) IV Push every 8 hours PRN Nausea and/or Vomiting    Physical examination:  VITALS: T(C): 37 (09-26-23 @ 11:49)  T(F): 98.6 (09-26-23 @ 11:49), Max: 98.8 (09-25-23 @ 20:50)  HR: 87 (09-26-23 @ 11:49) (78 - 87)  BP: 138/53 (09-26-23 @ 11:49) (102/46 - 153/66)  RR:  (16 - 18)  SpO2:  (94% - 97%)    GENERAL: NAD,   HEENT:  mucous membranes normal  THYROID: Normal size, no palpable nodules  RESPIRATORY: Clear to auscultation bilaterally; No rales, rhonchi, wheezing  CARDIOVASCULAR: Regular rate and rhythm; No murmurs; no peripheral edema  GI: Soft, nontender, non distended, +ve abdominal obesity  EXTREMITIES: +ve peripheral pulses, -ve pedal edema  SKIN:  intact, No rashes or lesions  PSYCH: Alert and oriented x 3      Lab results:  Capillary Blood Glucose:  POCT Blood Glucose.: 284 mg/dL (26 Sep 2023 11:28)  POCT Blood Glucose.: 230 mg/dL (26 Sep 2023 07:36)  POCT Blood Glucose.: 196 mg/dL (25 Sep 2023 21:15)  POCT Blood Glucose.: 222 mg/dL (25 Sep 2023 17:28)  POCT Blood Glucose.: 199 mg/dL (25 Sep 2023 16:25)    09-24  139  |  103  |  22<H>  ----------------------------<  234<H>  4.3   |  30  |  0.79  eGFR: 90  Ca    9.5      09-24    Assessment and Plan:  52y Female, who ambulates with a walker (only for past 3 weeks), from home, with PMH of DM, HTN, & HLD. Presented with worsening of left sided weakness, slurred speech, and dizziness x 3 weeks. On outpatient MR head patient was found to have a subacute infarct in Sandra, and middle cerebellar peduncles. Admitted for management of Acute Stroke. Repeat MR head shows acute infarct in the central sandra. Endocrinology is consulted for management of  uncontrolled DM.    1) Poorly controlled type 2 diabetes:  2) Cerebrovascular accident    A1C is poorly controlled likely due to non- compliance with insulins and dietary indiscretion  Patient has fair appetite  Fasting and postprandial Blood sugars are above goal  Adjust the insulin as below    In patient Recommendations:  - Basal Insulin:   Glargine  (Lantus) 25 units once daily    - Nutritional Insulin:  Lispro (Admelog) 8 units with breakfast, hold if NPO or eating <50% of meals    - Correctional (Sliding) Insulin:  Low Lispro (Admelog) sliding scale with meals and bedtime    - Oral Medications:  None in the hospital      Discharge recommendation:  Lantus solostar pen - 35units once at bedtime  Lispro Kwik pen     12 units if blood glucose between     14 units if blood glucose more than 150    16 units if blood glucose more than 250    18 units if blood glucose more than 350   C/w Ozempic and Pioglitazone (home dose)  Discontinue insulin 70/30,  Glipizide and Jardiance    Please ensure patient has all insulin supplies including glucometer, test strips, lancets, alcohol pads, insulin PEN, PEN needles on discharge

## 2023-09-26 NOTE — PROGRESS NOTE ADULT - TIME BILLING
minutes spent the time noted on the day of this patient encounter preparing for, reviewing records/charts/labs, interview and physical examination, coordination of care with patient and primary team, providing and documenting the above E/M service and 50% of time spent face to face counseling and education provided to patient on disease course, and treatment/management. All questions and concerns were answered and addressed in detail.
care coordination

## 2023-09-27 LAB
DNA PLOIDY SPEC FC-IMP: SIGNIFICANT CHANGE UP
PTR INTERPRETATION: SIGNIFICANT CHANGE UP

## 2023-09-28 ENCOUNTER — NON-APPOINTMENT (OUTPATIENT)
Age: 53
End: 2023-09-28

## 2023-09-30 ENCOUNTER — INPATIENT (INPATIENT)
Facility: HOSPITAL | Age: 53
LOS: 4 days | Discharge: HOME | DRG: 194 | End: 2023-10-05
Attending: INTERNAL MEDICINE | Admitting: INTERNAL MEDICINE
Payer: MEDICAID

## 2023-09-30 VITALS
RESPIRATION RATE: 18 BRPM | SYSTOLIC BLOOD PRESSURE: 134 MMHG | DIASTOLIC BLOOD PRESSURE: 66 MMHG | HEART RATE: 94 BPM | OXYGEN SATURATION: 95 % | TEMPERATURE: 99 F | WEIGHT: 175.27 LBS | HEIGHT: 68 IN

## 2023-09-30 DIAGNOSIS — K35.80 UNSPECIFIED ACUTE APPENDICITIS: Chronic | ICD-10-CM

## 2023-09-30 PROCEDURE — 71045 X-RAY EXAM CHEST 1 VIEW: CPT | Mod: 26

## 2023-09-30 PROCEDURE — 99285 EMERGENCY DEPT VISIT HI MDM: CPT

## 2023-09-30 NOTE — ED PROVIDER NOTE - OBJECTIVE STATEMENT
52-year-old female history of diabetes, hypertension, recent acute CVA on baby aspirin and Plavix presenting with hemoptysis since Monday.  Patient states that symptoms onset were during her hospital stay.  Reports having dark blood and congestion.  Feels "congested" in her chest.  Denies any shortness of breath, fevers, chills or epistaxis.  Denies any history of bronchiectasis, TB or embolism.

## 2023-09-30 NOTE — ED PROVIDER NOTE - CARE PLAN
1 Principal Discharge DX:	Hemoptysis   Principal Discharge DX:	Pneumonia  Secondary Diagnosis:	Hemoptysis

## 2023-09-30 NOTE — ED PROVIDER NOTE - NSTIMEPROVIDERCAREINITIATE_GEN_ER
30-Sep-2023 23:57

## 2023-09-30 NOTE — ED PROVIDER NOTE - CLINICAL SUMMARY MEDICAL DECISION MAKING FREE TEXT BOX
52-year-old female presenting with new onset of hemoptysis.  Symptoms may suggest bronchitis, bronchiectasis less likely PE.  Plan to perform labs and CTA chest and reassess.

## 2023-09-30 NOTE — ED PROVIDER NOTE - COVID-19 ORDERING FACILITY
[facility] Niacinamide Pregnancy And Lactation Text: These medications are considered safe during pregnancy.

## 2023-10-01 DIAGNOSIS — E78.5 HYPERLIPIDEMIA, UNSPECIFIED: ICD-10-CM

## 2023-10-01 DIAGNOSIS — J18.9 PNEUMONIA, UNSPECIFIED ORGANISM: ICD-10-CM

## 2023-10-01 DIAGNOSIS — I10 ESSENTIAL (PRIMARY) HYPERTENSION: ICD-10-CM

## 2023-10-01 DIAGNOSIS — I63.9 CEREBRAL INFARCTION, UNSPECIFIED: ICD-10-CM

## 2023-10-01 DIAGNOSIS — Z29.9 ENCOUNTER FOR PROPHYLACTIC MEASURES, UNSPECIFIED: ICD-10-CM

## 2023-10-01 DIAGNOSIS — R04.2 HEMOPTYSIS: ICD-10-CM

## 2023-10-01 DIAGNOSIS — E11.9 TYPE 2 DIABETES MELLITUS WITHOUT COMPLICATIONS: ICD-10-CM

## 2023-10-01 DIAGNOSIS — D18.00 HEMANGIOMA UNSPECIFIED SITE: ICD-10-CM

## 2023-10-01 LAB
ALBUMIN SERPL ELPH-MCNC: 2.7 G/DL — LOW (ref 3.5–5)
ALP SERPL-CCNC: 98 U/L — SIGNIFICANT CHANGE UP (ref 40–120)
ALT FLD-CCNC: 42 U/L DA — SIGNIFICANT CHANGE UP (ref 10–60)
ANION GAP SERPL CALC-SCNC: 4 MMOL/L — LOW (ref 5–17)
APPEARANCE UR: CLEAR — SIGNIFICANT CHANGE UP
APTT BLD: 34.9 SEC — SIGNIFICANT CHANGE UP (ref 24.5–35.6)
AST SERPL-CCNC: 16 U/L — SIGNIFICANT CHANGE UP (ref 10–40)
BACTERIA # UR AUTO: ABNORMAL /HPF
BASOPHILS # BLD AUTO: 0.04 K/UL — SIGNIFICANT CHANGE UP (ref 0–0.2)
BASOPHILS # BLD AUTO: 0.04 K/UL — SIGNIFICANT CHANGE UP (ref 0–0.2)
BASOPHILS NFR BLD AUTO: 0.6 % — SIGNIFICANT CHANGE UP (ref 0–2)
BASOPHILS NFR BLD AUTO: 0.7 % — SIGNIFICANT CHANGE UP (ref 0–2)
BILIRUB SERPL-MCNC: 0.2 MG/DL — SIGNIFICANT CHANGE UP (ref 0.2–1.2)
BILIRUB UR-MCNC: NEGATIVE — SIGNIFICANT CHANGE UP
BLD GP AB SCN SERPL QL: SIGNIFICANT CHANGE UP
BUN SERPL-MCNC: 21 MG/DL — HIGH (ref 7–18)
CALCIUM SERPL-MCNC: 9 MG/DL — SIGNIFICANT CHANGE UP (ref 8.4–10.5)
CHLORIDE SERPL-SCNC: 105 MMOL/L — SIGNIFICANT CHANGE UP (ref 96–108)
CO2 SERPL-SCNC: 31 MMOL/L — SIGNIFICANT CHANGE UP (ref 22–31)
COLOR SPEC: YELLOW — SIGNIFICANT CHANGE UP
COMMENT - URINE: SIGNIFICANT CHANGE UP
CREAT SERPL-MCNC: 0.73 MG/DL — SIGNIFICANT CHANGE UP (ref 0.5–1.3)
DIFF PNL FLD: NEGATIVE — SIGNIFICANT CHANGE UP
EGFR: 99 ML/MIN/1.73M2 — SIGNIFICANT CHANGE UP
EOSINOPHIL # BLD AUTO: 0.15 K/UL — SIGNIFICANT CHANGE UP (ref 0–0.5)
EOSINOPHIL # BLD AUTO: 0.16 K/UL — SIGNIFICANT CHANGE UP (ref 0–0.5)
EOSINOPHIL NFR BLD AUTO: 2.3 % — SIGNIFICANT CHANGE UP (ref 0–6)
EOSINOPHIL NFR BLD AUTO: 2.6 % — SIGNIFICANT CHANGE UP (ref 0–6)
EPI CELLS # UR: PRESENT
GLUCOSE BLDC GLUCOMTR-MCNC: 104 MG/DL — HIGH (ref 70–99)
GLUCOSE BLDC GLUCOMTR-MCNC: 107 MG/DL — HIGH (ref 70–99)
GLUCOSE BLDC GLUCOMTR-MCNC: 169 MG/DL — HIGH (ref 70–99)
GLUCOSE SERPL-MCNC: 193 MG/DL — HIGH (ref 70–99)
GLUCOSE UR QL: 100 MG/DL
HCG SERPL-ACNC: 2 MIU/ML — SIGNIFICANT CHANGE UP
HCT VFR BLD CALC: 35.7 % — SIGNIFICANT CHANGE UP (ref 34.5–45)
HCT VFR BLD CALC: 36.1 % — SIGNIFICANT CHANGE UP (ref 34.5–45)
HGB BLD-MCNC: 11.4 G/DL — LOW (ref 11.5–15.5)
HGB BLD-MCNC: 11.4 G/DL — LOW (ref 11.5–15.5)
IMM GRANULOCYTES NFR BLD AUTO: 0.2 % — SIGNIFICANT CHANGE UP (ref 0–0.9)
IMM GRANULOCYTES NFR BLD AUTO: 0.3 % — SIGNIFICANT CHANGE UP (ref 0–0.9)
INR BLD: 1 RATIO — SIGNIFICANT CHANGE UP (ref 0.85–1.18)
KETONES UR-MCNC: NEGATIVE MG/DL — SIGNIFICANT CHANGE UP
LACTATE SERPL-SCNC: 1.3 MMOL/L — SIGNIFICANT CHANGE UP (ref 0.7–2)
LEUKOCYTE ESTERASE UR-ACNC: ABNORMAL
LYMPHOCYTES # BLD AUTO: 2.43 K/UL — SIGNIFICANT CHANGE UP (ref 1–3.3)
LYMPHOCYTES # BLD AUTO: 2.56 K/UL — SIGNIFICANT CHANGE UP (ref 1–3.3)
LYMPHOCYTES # BLD AUTO: 37.2 % — SIGNIFICANT CHANGE UP (ref 13–44)
LYMPHOCYTES # BLD AUTO: 42.9 % — SIGNIFICANT CHANGE UP (ref 13–44)
MCHC RBC-ENTMCNC: 27.9 PG — SIGNIFICANT CHANGE UP (ref 27–34)
MCHC RBC-ENTMCNC: 28.2 PG — SIGNIFICANT CHANGE UP (ref 27–34)
MCHC RBC-ENTMCNC: 31.6 GM/DL — LOW (ref 32–36)
MCHC RBC-ENTMCNC: 31.9 GM/DL — LOW (ref 32–36)
MCV RBC AUTO: 88.4 FL — SIGNIFICANT CHANGE UP (ref 80–100)
MCV RBC AUTO: 88.5 FL — SIGNIFICANT CHANGE UP (ref 80–100)
MONOCYTES # BLD AUTO: 0.43 K/UL — SIGNIFICANT CHANGE UP (ref 0–0.9)
MONOCYTES # BLD AUTO: 0.45 K/UL — SIGNIFICANT CHANGE UP (ref 0–0.9)
MONOCYTES NFR BLD AUTO: 6.5 % — SIGNIFICANT CHANGE UP (ref 2–14)
MONOCYTES NFR BLD AUTO: 7.6 % — SIGNIFICANT CHANGE UP (ref 2–14)
NEUTROPHILS # BLD AUTO: 2.61 K/UL — SIGNIFICANT CHANGE UP (ref 1.8–7.4)
NEUTROPHILS # BLD AUTO: 3.66 K/UL — SIGNIFICANT CHANGE UP (ref 1.8–7.4)
NEUTROPHILS NFR BLD AUTO: 46 % — SIGNIFICANT CHANGE UP (ref 43–77)
NEUTROPHILS NFR BLD AUTO: 53.1 % — SIGNIFICANT CHANGE UP (ref 43–77)
NITRITE UR-MCNC: NEGATIVE — SIGNIFICANT CHANGE UP
NRBC # BLD: 0 /100 WBCS — SIGNIFICANT CHANGE UP (ref 0–0)
NRBC # BLD: 0 /100 WBCS — SIGNIFICANT CHANGE UP (ref 0–0)
PH UR: 6.5 — SIGNIFICANT CHANGE UP (ref 5–8)
PLATELET # BLD AUTO: 385 K/UL — SIGNIFICANT CHANGE UP (ref 150–400)
PLATELET # BLD AUTO: 412 K/UL — HIGH (ref 150–400)
POTASSIUM SERPL-MCNC: 4.3 MMOL/L — SIGNIFICANT CHANGE UP (ref 3.5–5.3)
POTASSIUM SERPL-SCNC: 4.3 MMOL/L — SIGNIFICANT CHANGE UP (ref 3.5–5.3)
PROT SERPL-MCNC: 7.4 G/DL — SIGNIFICANT CHANGE UP (ref 6–8.3)
PROT UR-MCNC: NEGATIVE MG/DL — SIGNIFICANT CHANGE UP
PROTHROM AB SERPL-ACNC: 11.4 SEC — SIGNIFICANT CHANGE UP (ref 9.5–13)
RBC # BLD: 4.04 M/UL — SIGNIFICANT CHANGE UP (ref 3.8–5.2)
RBC # BLD: 4.08 M/UL — SIGNIFICANT CHANGE UP (ref 3.8–5.2)
RBC # FLD: 12.4 % — SIGNIFICANT CHANGE UP (ref 10.3–14.5)
RBC # FLD: 12.5 % — SIGNIFICANT CHANGE UP (ref 10.3–14.5)
RBC CASTS # UR COMP ASSIST: 1 /HPF — SIGNIFICANT CHANGE UP (ref 0–4)
SARS-COV-2 RNA SPEC QL NAA+PROBE: SIGNIFICANT CHANGE UP
SODIUM SERPL-SCNC: 140 MMOL/L — SIGNIFICANT CHANGE UP (ref 135–145)
SP GR SPEC: 1.01 — SIGNIFICANT CHANGE UP (ref 1–1.03)
UROBILINOGEN FLD QL: 0.2 MG/DL — SIGNIFICANT CHANGE UP (ref 0.2–1)
WBC # BLD: 5.67 K/UL — SIGNIFICANT CHANGE UP (ref 3.8–10.5)
WBC # BLD: 6.89 K/UL — SIGNIFICANT CHANGE UP (ref 3.8–10.5)
WBC # FLD AUTO: 5.67 K/UL — SIGNIFICANT CHANGE UP (ref 3.8–10.5)
WBC # FLD AUTO: 6.89 K/UL — SIGNIFICANT CHANGE UP (ref 3.8–10.5)
WBC UR QL: 2 /HPF — SIGNIFICANT CHANGE UP (ref 0–5)

## 2023-10-01 PROCEDURE — 71275 CT ANGIOGRAPHY CHEST: CPT | Mod: 26,MA

## 2023-10-01 RX ORDER — PIPERACILLIN AND TAZOBACTAM 4; .5 G/20ML; G/20ML
3.38 INJECTION, POWDER, LYOPHILIZED, FOR SOLUTION INTRAVENOUS ONCE
Refills: 0 | Status: COMPLETED | OUTPATIENT
Start: 2023-10-01 | End: 2023-10-01

## 2023-10-01 RX ORDER — ASPIRIN/CALCIUM CARB/MAGNESIUM 324 MG
81 TABLET ORAL DAILY
Refills: 0 | Status: DISCONTINUED | OUTPATIENT
Start: 2023-10-01 | End: 2023-10-05

## 2023-10-01 RX ORDER — PIPERACILLIN AND TAZOBACTAM 4; .5 G/20ML; G/20ML
3.38 INJECTION, POWDER, LYOPHILIZED, FOR SOLUTION INTRAVENOUS ONCE
Refills: 0 | Status: DISCONTINUED | OUTPATIENT
Start: 2023-10-01 | End: 2023-10-01

## 2023-10-01 RX ORDER — INSULIN LISPRO 100/ML
12 VIAL (ML) SUBCUTANEOUS
Refills: 0 | Status: DISCONTINUED | OUTPATIENT
Start: 2023-10-01 | End: 2023-10-04

## 2023-10-01 RX ORDER — LOSARTAN POTASSIUM 100 MG/1
50 TABLET, FILM COATED ORAL DAILY
Refills: 0 | Status: DISCONTINUED | OUTPATIENT
Start: 2023-10-01 | End: 2023-10-05

## 2023-10-01 RX ORDER — INSULIN GLARGINE 100 [IU]/ML
35 INJECTION, SOLUTION SUBCUTANEOUS AT BEDTIME
Refills: 0 | Status: DISCONTINUED | OUTPATIENT
Start: 2023-10-01 | End: 2023-10-05

## 2023-10-01 RX ORDER — INSULIN LISPRO 100/ML
VIAL (ML) SUBCUTANEOUS
Refills: 0 | Status: DISCONTINUED | OUTPATIENT
Start: 2023-10-01 | End: 2023-10-05

## 2023-10-01 RX ORDER — PIPERACILLIN AND TAZOBACTAM 4; .5 G/20ML; G/20ML
3.38 INJECTION, POWDER, LYOPHILIZED, FOR SOLUTION INTRAVENOUS EVERY 8 HOURS
Refills: 0 | Status: DISCONTINUED | OUTPATIENT
Start: 2023-10-01 | End: 2023-10-01

## 2023-10-01 RX ORDER — SEMAGLUTIDE 0.68 MG/ML
0.5 INJECTION, SOLUTION SUBCUTANEOUS
Refills: 0 | DISCHARGE

## 2023-10-01 RX ORDER — ATORVASTATIN CALCIUM 80 MG/1
80 TABLET, FILM COATED ORAL AT BEDTIME
Refills: 0 | Status: DISCONTINUED | OUTPATIENT
Start: 2023-10-01 | End: 2023-10-05

## 2023-10-01 RX ORDER — PANTOPRAZOLE SODIUM 20 MG/1
40 TABLET, DELAYED RELEASE ORAL
Refills: 0 | Status: DISCONTINUED | OUTPATIENT
Start: 2023-10-01 | End: 2023-10-05

## 2023-10-01 RX ORDER — PIOGLITAZONE HYDROCHLORIDE 15 MG/1
1 TABLET ORAL
Qty: 0 | Refills: 0 | DISCHARGE

## 2023-10-01 RX ORDER — PIPERACILLIN AND TAZOBACTAM 4; .5 G/20ML; G/20ML
3.38 INJECTION, POWDER, LYOPHILIZED, FOR SOLUTION INTRAVENOUS EVERY 8 HOURS
Refills: 0 | Status: DISCONTINUED | OUTPATIENT
Start: 2023-10-01 | End: 2023-10-05

## 2023-10-01 RX ORDER — INFLUENZA VIRUS VACCINE 15; 15; 15; 15 UG/.5ML; UG/.5ML; UG/.5ML; UG/.5ML
0.5 SUSPENSION INTRAMUSCULAR ONCE
Refills: 0 | Status: DISCONTINUED | OUTPATIENT
Start: 2023-10-01 | End: 2023-10-05

## 2023-10-01 RX ORDER — OMEPRAZOLE 10 MG/1
1 CAPSULE, DELAYED RELEASE ORAL
Refills: 0 | DISCHARGE

## 2023-10-01 RX ORDER — AZITHROMYCIN 500 MG/1
500 TABLET, FILM COATED ORAL ONCE
Refills: 0 | Status: COMPLETED | OUTPATIENT
Start: 2023-10-01 | End: 2023-10-01

## 2023-10-01 RX ORDER — CEFTRIAXONE 500 MG/1
1000 INJECTION, POWDER, FOR SOLUTION INTRAMUSCULAR; INTRAVENOUS ONCE
Refills: 0 | Status: COMPLETED | OUTPATIENT
Start: 2023-10-01 | End: 2023-10-01

## 2023-10-01 RX ADMIN — Medication 81 MILLIGRAM(S): at 11:47

## 2023-10-01 RX ADMIN — Medication 12 UNIT(S): at 17:23

## 2023-10-01 RX ADMIN — Medication 1: at 11:47

## 2023-10-01 RX ADMIN — CEFTRIAXONE 100 MILLIGRAM(S): 500 INJECTION, POWDER, FOR SOLUTION INTRAMUSCULAR; INTRAVENOUS at 07:05

## 2023-10-01 RX ADMIN — INSULIN GLARGINE 35 UNIT(S): 100 INJECTION, SOLUTION SUBCUTANEOUS at 22:45

## 2023-10-01 RX ADMIN — AZITHROMYCIN 255 MILLIGRAM(S): 500 TABLET, FILM COATED ORAL at 07:05

## 2023-10-01 RX ADMIN — ATORVASTATIN CALCIUM 80 MILLIGRAM(S): 80 TABLET, FILM COATED ORAL at 22:45

## 2023-10-01 RX ADMIN — PIPERACILLIN AND TAZOBACTAM 200 GRAM(S): 4; .5 INJECTION, POWDER, LYOPHILIZED, FOR SOLUTION INTRAVENOUS at 10:30

## 2023-10-01 RX ADMIN — PIPERACILLIN AND TAZOBACTAM 25 GRAM(S): 4; .5 INJECTION, POWDER, LYOPHILIZED, FOR SOLUTION INTRAVENOUS at 22:45

## 2023-10-01 RX ADMIN — Medication 12 UNIT(S): at 11:47

## 2023-10-01 NOTE — CONSULT NOTE ADULT - ASSESSMENT
53 y/o  female with pmhx  of DM, HTN,  recent acute CVA on baby aspirin and Plavix presenting with hemoptysis since Monday.  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  Reports having six episodes since being discharged home, mucus with streaks of blood,pneumonia.  1.Pneumonia-abx.  2.Pulm eval noted.  3.HTN-cozaar.  4.CVA-asa, statin, plavix on hold.  5.DM-Insulin.  6.Lipid d/o-statin.  7.Gi and DVT prophylaxis.

## 2023-10-01 NOTE — H&P ADULT - PROBLEM SELECTOR PLAN 2
CT showing LLL consolidation   s/p azithromycin and Rocephin   f/u Bcux  start Zosyn   ID Dr. Mojica

## 2023-10-01 NOTE — H&P ADULT - ASSESSMENT
This is a 53 y/o  female with pmhx  of DM, HTN,  recent acute CVA on baby aspirin and Plavix presenting with hemoptysis since Monday.  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  Reports having six episodes since being discharged home, mucus with streaks of blood. Admitted for hemoptysis r/o TB   In ED  vitals: BP: 124/72, HR: 88, T: 36 on RA  f/u Ucx, Bcux   CTA no PE, LLL opacity   CXR: LLL opacity

## 2023-10-01 NOTE — PATIENT PROFILE ADULT - FUNCTIONAL ASSESSMENT - BASIC MOBILITY 3.
[FreeTextEntry3] : I, Julieta Cantor, personally transcribed these services in the presence of Dr. Nova Vogel MD. I, Dr. Nova Vogel MD, personally performed the services described in this documentation as scribed by Julieta Cantor in my presence and it is both accurate and complete.  4 = No assist / stand by assistance

## 2023-10-01 NOTE — PATIENT PROFILE ADULT - FALL HARM RISK - HARM RISK INTERVENTIONS

## 2023-10-01 NOTE — H&P ADULT - HISTORY OF PRESENT ILLNESS
This is a 53 y/o  female with pmhx  of DM, HTN,  recent acute CVA on baby aspirin and Plavix presenting with hemoptysis since Monday.  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  Reports having six episodes since being discharged home, mucus with streaks of blood. Endorses chest tightness and dizziness when standing.  Denies any shortness of breath, fevers, chills or epistaxis.  Denies any history of bronchiectasis, TB or embolism.     In ED  vitals: BP: 124/72, HR: 88, T: 36 on RA  f/u Ucx, Bcux   CTA no PE, LLL opacity   CXR: LLL opacity

## 2023-10-01 NOTE — H&P ADULT - PROBLEM SELECTOR PLAN 6
Recent Visits  Date Type Provider Dept   02/08/22 Office Visit Ty Georges MD [facility]   11/02/21 Office Visit Ty Georges MD [facility]   08/31/21 Office Visit Ty Georges MD [facility]   06/08/21 Office Visit Ty Georges MD [facility]   03/01/21 Office Visit Ty Georges MD [facility]   10/27/20 Office Visit Merlinda Rolls, APRN - CNP [facility]   Showing recent visits within past 540 days with a meds authorizing provider and meeting all other requirements  Future Appointments  Date Type Provider Dept   05/10/22 Appointment Ty Georges MD [facility]   Showing future appointments within next 150 days with a meds authorizing provider and meeting all other requirements     2/8/2022
ON Ozempic, lantus 25u, and admelog 12 u TID  ISS +lantus 35 u + amdelog 12u

## 2023-10-01 NOTE — H&P ADULT - PROBLEM SELECTOR PLAN 1
p/w hemoptysis since september 26 x 6 epsiodes  Hb: 11.4  r/o TB, PE, AC SE   recent admission for CVA started ASA and plavix   hol dpalvix, c/w ASA   r/o TB x3 sputum cultures, isolation precaution  CTA negative for PE  Dr. Sheikh consulted  ID Dr. Mojica   Pulrima Hughes

## 2023-10-01 NOTE — CONSULT NOTE ADULT - SUBJECTIVE AND OBJECTIVE BOX
PULMONARY CONSULT NOTE      JOANNE ROGERS  MRN-[id number]      History of Present Illness:  History of Present Illness:   Patient is a 51 y/o F who ambulates with a walker (only for past few weeks) with PMH of DM, HTN, HLD who presented with productive cough and chest pain  HISTORY OF PRESENT ILLNESS: As Above. Awake, alert, comfortabl ein bed in NAD    MEDICATIONS  (STANDING):  aspirin enteric coated 81 milliGRAM(s) Oral daily  atorvastatin 80 milliGRAM(s) Oral at bedtime  influenza   Vaccine 0.5 milliLiter(s) IntraMuscular once  insulin glargine Injectable (LANTUS) 35 Unit(s) SubCutaneous at bedtime  insulin lispro (ADMELOG) corrective regimen sliding scale   SubCutaneous three times a day before meals  insulin lispro Injectable (ADMELOG) 12 Unit(s) SubCutaneous three times a day before meals  losartan 50 milliGRAM(s) Oral daily  pantoprazole    Tablet 40 milliGRAM(s) Oral before breakfast  piperacillin/tazobactam IVPB.. 3.375 Gram(s) IV Intermittent every 8 hours      MEDICATIONS  (PRN):      Allergies    NSAIDs (Short breath)  peppers (Rash)    Intolerances        PAST MEDICAL & SURGICAL HISTORY:  Hypertension      Hyperlipidemia      Acute appendicitis      Hand fracture      Type 2 diabetes mellitus      Acute appendicitis          FAMILY HISTORY:  Family history of diabetes mellitus (Mother)    Family history of hypertension (Mother)        SOCIAL HISTORY  Smoking History:     REVIEW OF SYSTEMS:    CONSTITUTIONAL:  No fevers, chills, sweats    HEENT:  Eyes:  No diplopia or blurred vision. ENT:  No earache, sore throat or runny nose.    CARDIOVASCULAR:  No pressure, squeezing, tightness, or heaviness about the chest; no palpitations.    RESPIRATORY:  Per HPI    GASTROINTESTINAL:  No abdominal pain, nausea, vomiting or diarrhea.    GENITOURINARY:  No dysuria, frequency or urgency.    NEUROLOGIC:  No paresthesias, fasciculations, seizures or weakness.    PSYCHIATRIC:  No disorder of thought or mood.    Vital Signs Last 24 Hrs  T(C): 37.4 (01 Oct 2023 11:19), Max: 37.4 (01 Oct 2023 11:19)  T(F): 99.3 (01 Oct 2023 11:19), Max: 99.3 (01 Oct 2023 11:19)  HR: 81 (01 Oct 2023 11:19) (81 - 94)  BP: 142/72 (01 Oct 2023 11:19) (124/72 - 142/72)  BP(mean): --  RR: 17 (01 Oct 2023 11:19) (17 - 18)  SpO2: 95% (01 Oct 2023 11:19) (94% - 95%)    Parameters below as of 01 Oct 2023 11:19  Patient On (Oxygen Delivery Method): room air      I&O's Detail      PHYSICAL EXAMINATION:    GENERAL: The patient is a well-developed, well-nourished _____in no apparent distress.     HEENT: Head is normocephalic and atraumatic. Extraocular muscles are intact. Mucous membranes are moist.     NECK: Supple.     LUNGS: Clear to auscultation without wheezing, rales, or rhonchi. Respirations unlabored    HEART: Regular rate and rhythm without murmur.    ABDOMEN: Soft, nontender, and nondistended.  No hepatosplenomegaly is noted.    EXTREMITIES: Without any cyanosis, clubbing, rash, lesions or edema.    NEUROLOGIC: Grossly intact.      LABS:                        11.4   6.89  )-----------( 412      ( 01 Oct 2023 01:04 )             36.1     10-    140  |  105  |  21<H>  ----------------------------<  193<H>  4.3   |  31  |  0.73    Ca    9.0      01 Oct 2023 01:04    TPro  7.4  /  Alb  2.7<L>  /  TBili  0.2  /  DBili  x   /  AST  16  /  ALT  42  /  AlkPhos  98  10-    PT/INR - ( 01 Oct 2023 01:04 )   PT: 11.4 sec;   INR: 1.00 ratio         PTT - ( 01 Oct 2023 01:04 )  PTT:34.9 sec  Urinalysis Basic - ( 01 Oct 2023 03:04 )    Color: Yellow / Appearance: Clear / S.014 / pH: x  Gluc: x / Ketone: Negative mg/dL  / Bili: Negative / Urobili: 0.2 mg/dL   Blood: x / Protein: Negative mg/dL / Nitrite: Negative   Leuk Esterase: Small / RBC: 1 /HPF / WBC 2 /HPF   Sq Epi: x / Non Sq Epi: x / Bacteria: Few /HPF                Lactate, Blood: 1.3 mmol/L (10-01-23 @ 01:02)        MICROBIOLOGY:    RADIOLOGY & ADDITIONAL STUDIES:  < from: CT Angio Chest PE Protocol w/ IV Cont (10.01.23 @ 03:01) >  IMPRESSION:  No pulmonary embolism.    Left lateral base opacity may represent atelectasis or pneumonia.   Recommend follow-up in 6 weeks for resolution.  Dependent atelectatic changes.      < end of copied text >    CXR:    Ct scan chest;    ekg;    echo:

## 2023-10-01 NOTE — ED ADULT NURSE NOTE - OBJECTIVE STATEMENT
I have been coughing up blood since 9/26/2023. Pt. stated that the blood started . Denies all other symptoms

## 2023-10-01 NOTE — CONSULT NOTE ADULT - ASSESSMENT
Patient is a 52y old  Female with pmhx  of DM, HTN,  recent acute CVA on baby aspirin and Plavix, now presents to the ER for evaluation of hemoptysis since Monday.  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  Reports having six episodes since being discharged home, mucus with streaks of blood. Endorses chest tightness and dizziness when standing.  Denies any shortness of breath, fevers, chills or epistaxis.  Denies any history of bronchiectasis, TB or embolism. On admission, she found to have no fever, no Leukocytosis and Persistent left lower lobe opacity suspicious for pneumonia on CXR. She has started on Zosyn and the ID consult requested to assist with further evaluation and antibiotic  management.     # Pneumonia  # Hemoptysis- R/O TB    would recommend:    1. Obtain sputum for AFB smear and culture  2. Please obtain MRSA PCR and Legionella urine AG  3. Continue Zosyn until work up is done    will follow the patient with you and make further recommendation based on the clinical course and Lab results  Thank you for the opportunity to participate in Ms. ROGERS's care    Attending Attestation:     Spent more than 65 minutes on total encounter, more than 50 % of the visit was spent counseling and/or coordinating care by the Attending physician.   Patient is a 52y old  Female with pmhx  of DM, HTN,  recent acute CVA on baby aspirin and Plavix, now presents to the ER for evaluation of hemoptysis since Monday.  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  Reports having six episodes since being discharged home, mucus with streaks of blood. Endorses chest tightness and dizziness when standing.  Denies any shortness of breath, fevers, chills or epistaxis.  Denies any history of bronchiectasis, TB or embolism. On admission, she found to have no fever, no Leukocytosis and Persistent left lower lobe opacity suspicious for pneumonia on CXR. She has started on Zosyn and the ID consult requested to assist with further evaluation and antibiotic  management.     # Pneumonia  # Hemoptysis- R/O TB    would recommend:    1. Obtain sputum for AFB smear and culture  2. Please obtain MRSA PCR and Legionella urine AG  3. Continue Zosyn until work up is done  4. Procalcitonin level     d/w patient and admitting  resident      will follow the patient with you and make further recommendation based on the clinical course and Lab results  Thank you for the opportunity to participate in Ms. ROGERS's care    Attending Attestation:     Spent more than 65 minutes on total encounter, more than 50 % of the visit was spent counseling and/or coordinating care by the Attending physician.

## 2023-10-01 NOTE — H&P ADULT - PROBLEM SELECTOR PLAN 3
hx CVA dc on september 26 on ASA , Plavix and atorvastatin   c/w asa and atorvastatin   hold Plavix in setting of hemoptysis

## 2023-10-01 NOTE — H&P ADULT - ATTENDING COMMENTS
"Pt reports root canal 1.5 weeks ago on antibiotics and noted abd pain and nausea last week. She notes fever and body aches last night that she did not treat. Concerned about ' sepsis\"  " 53 y/o  female with pmhx  of DM, HTN,  recent acute CVA on baby aspirin and Plavix presenting with hemoptysis since Monday.  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  Reports having six episodes since being discharged home, mucus with streaks of blood. Endorses chest tightness and dizziness when standing.  Denies any shortness of breath, fevers, chills or epistaxis.  Denies any history of bronchiectasis, TB or embolism.     In ED  vitals: BP: 124/72, HR: 88, T: 36 on RA  f/u Ucx, Bcux   CTA no PE, LLL opacity   CXR: LLL opacity        assessment  --  pneumonia with hemoptysis, pe r/o,  h/o DM, HTN,  recent acute CVA on baby aspirin and Plavix    plan  --  admit to med, zosyn, atrov and prov nebs, supplemental oxygen, hold plavix, lantus 35 qhs, lispro 12 actid, lispro ss, cont preadmit home meds, gi and dvt prophylaxis,   cbc, bmp, mg, phos, lipids, tsh, bld cx,      id cons  pulm cons   cardio cons   endo cons

## 2023-10-01 NOTE — H&P ADULT - NSHPPHYSICALEXAM_GEN_ALL_CORE
GENERAL: NAD, well-developed, well-groomed  HEAD:  Atraumatic, Normocephalic  EYES:  conjunctiva and sclera clear  NECK: Supple, No JVD, Normal thyroid  CHEST/LUNG: Clear to auscultation. No rales, rhonchi, wheezing, or rubs  HEART: Regular rate and rhythm; No murmurs, rubs, or gallops  ABDOMEN: Soft, Nontender, Nondistended; Bowel sounds present  NERVOUS SYSTEM:  Alert & Oriented X3,    EXTREMITIES:  2+ Peripheral Pulses, No clubbing, cyanosis, or (+) 1 b/l pitting edema  SKIN: warm dry

## 2023-10-01 NOTE — ED ADULT NURSE NOTE - NSFALLUNIVINTERV_ED_ALL_ED
Bed/Stretcher in lowest position, wheels locked, appropriate side rails in place/Call bell, personal items and telephone in reach/Instruct patient to call for assistance before getting out of bed/chair/stretcher/Non-slip footwear applied when patient is off stretcher/[place] to call system/Physically safe environment - no spills, clutter or unnecessary equipment/Purposeful proactive rounding/Room/bathroom lighting operational, light cord in reach

## 2023-10-01 NOTE — CONSULT NOTE ADULT - SUBJECTIVE AND OBJECTIVE BOX
Patient is a 52y old  Female with pmhx  of DM, HTN,  recent acute CVA on baby aspirin and Plavix, now presents to the ER for evaluation of hemoptysis since Monday.  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  Reports having six episodes since being discharged home, mucus with streaks of blood. Endorses chest tightness and dizziness when standing.  Denies any shortness of breath, fevers, chills or epistaxis.  Denies any history of bronchiectasis, TB or embolism. On admission, she found to have no fever, no Leukocytosis and Persistent left lower lobe opacity suspicious for pneumonia on CXR. She has started on Zosyn and the ID consult requested to assist with further evaluation and antibiotic  management.       REVIEW OF SYSTEMS: Total of twelve systems have been reviewed with patient and found to be negative unless mentioned in HPI        PAST MEDICAL & SURGICAL HISTORY:  Hypertension  Hyperlipidemia  Acute appendicitis  Hand fracture  Type 2 diabetes mellitus  Acute appendicitis        SOCIAL HISTORY  Alcohol: Does not drink  Tobacco: Does not smoke  Illicit substance use: None      FAMILY HISTORY: Non contributory to the present illness        ALLERGIES: NSAIDs (Short breath)  peppers (Rash)        Vital Signs Last 24 Hrs  T(C): 36.3 (01 Oct 2023 15:10), Max: 37.4 (01 Oct 2023 11:19)  T(F): 97.3 (01 Oct 2023 15:10), Max: 99.3 (01 Oct 2023 11:19)  HR: 888 (01 Oct 2023 15:10) (81 - 888)  BP: 124/65 (01 Oct 2023 15:10) (124/65 - 142/72)  BP(mean): --  RR: 18 (01 Oct 2023 15:10) (17 - 18)  SpO2: 94% (01 Oct 2023 15:10) (94% - 95%)    Parameters below as of 01 Oct 2023 15:10  Patient On (Oxygen Delivery Method): room air      PHYSICAL EXAM:  GENERAL: Not in distress   CHEST/LUNG:  Not using accessory muscles   HEART: s1 and s2 present  ABDOMEN:  Nontender and  Nondistended  EXTREMITIES: No pedal  edema  CNS: Awake and Alert      LABS:                        11.4   6.89  )-----------( 412      ( 01 Oct 2023 01:04 )             36.1       10-    140  |  105  |  21<H>  ----------------------------<  193<H>  4.3   |  31  |  0.73    Ca    9.0      01 Oct 2023 01:04    TPro  7.4  /  Alb  2.7<L>  /  TBili  0.2  /  DBili  x   /  AST  16  /  ALT  42  /  AlkPhos  98  10-01    PT/INR - ( 01 Oct 2023 01:04 )   PT: 11.4 sec;   INR: 1.00 ratio      PTT - ( 01 Oct 2023 01:04 )  PTT:34.9 sec        CAPILLARY BLOOD GLUCOSE  POCT Blood Glucose.: 169 mg/dL (01 Oct 2023 11:39)        Urinalysis Basic - ( 01 Oct 2023 03:04 )  Color: Yellow / Appearance: Clear / S.014 / pH: x  Gluc: x / Ketone: Negative mg/dL  / Bili: Negative / Urobili: 0.2 mg/dL   Blood: x / Protein: Negative mg/dL / Nitrite: Negative   Leuk Esterase: Small / RBC: 1 /HPF / WBC 2 /HPF   Sq Epi: x / Non Sq Epi: x / Bacteria: Few /HPF        MEDICATIONS  (STANDING):  aspirin enteric coated 81 milliGRAM(s) Oral daily  atorvastatin 80 milliGRAM(s) Oral at bedtime  influenza   Vaccine 0.5 milliLiter(s) IntraMuscular once  insulin glargine Injectable (LANTUS) 35 Unit(s) SubCutaneous at bedtime  insulin lispro (ADMELOG) corrective regimen sliding scale   SubCutaneous three times a day before meals  insulin lispro Injectable (ADMELOG) 12 Unit(s) SubCutaneous three times a day before meals  losartan 50 milliGRAM(s) Oral daily  pantoprazole    Tablet 40 milliGRAM(s) Oral before breakfast  piperacillin/tazobactam IVPB.. 3.375 Gram(s) IV Intermittent every 8 hours    MEDICATIONS  (PRN):        RADIOLOGY & ADDITIONAL TESTS:    10/1/23 : CT Angio Chest PE Protocol w/ IV Cont (10.01.23 @ 03:01) >  IMPRESSION:  No pulmonary embolism.    Left lateral base opacity may represent atelectasis or pneumonia. Recommend follow-up in 6 weeks for resolution.  Dependent atelectatic changes.      23 : Xray Chest 1 View- PORTABLE-Urgent (23 @ 23:43) >    IMPRESSION: Persistent left lower lobe opacity suspicious for pneumonia.      MICROBIOLOGY DATA:  COVID-19 PCR (10.01.23 @ 01:04)   COVID-19 PCR: NotDetec:               Patient is a 52y old  Female with pmhx  of DM, HTN,  recent acute CVA on baby aspirin and Plavix, now presents to the ER for evaluation of hemoptysis since Monday.  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  Reports having six episodes since being discharged home, mucus with streaks of blood. Endorses chest tightness and dizziness when standing.  Denies any shortness of breath, fevers, chills or epistaxis.  Denies any history of bronchiectasis, TB or embolism. On admission, she found to have no fever, no Leukocytosis and Persistent left lower lobe opacity suspicious for pneumonia on CXR. She has started on Zosyn and the ID consult requested to assist with further evaluation and antibiotic  management.       REVIEW OF SYSTEMS: Total of twelve systems have been reviewed with patient and found to be negative unless mentioned in HPI      PAST MEDICAL & SURGICAL HISTORY:  Hypertension  Hyperlipidemia  Acute appendicitis  Hand fracture  Type 2 diabetes mellitus  Acute appendicitis        SOCIAL HISTORY  Alcohol: Does not drink  Tobacco: Does not smoke  Illicit substance use: None      FAMILY HISTORY: Non contributory to the present illness        ALLERGIES: NSAIDs (Short breath)  peppers (Rash)        Vital Signs Last 24 Hrs  T(C): 36.3 (01 Oct 2023 15:10), Max: 37.4 (01 Oct 2023 11:19)  T(F): 97.3 (01 Oct 2023 15:10), Max: 99.3 (01 Oct 2023 11:19)  HR: 888 (01 Oct 2023 15:10) (81 - 888)  BP: 124/65 (01 Oct 2023 15:10) (124/65 - 142/72)  BP(mean): --  RR: 18 (01 Oct 2023 15:10) (17 - 18)  SpO2: 94% (01 Oct 2023 15:10) (94% - 95%)    Parameters below as of 01 Oct 2023 15:10  Patient On (Oxygen Delivery Method): room air      PHYSICAL EXAM:  GENERAL: Not in distress   CHEST/LUNG:  Not using accessory muscles   HEART: s1 and s2 present  ABDOMEN:  Nontender and  Nondistended  EXTREMITIES: No pedal  edema  CNS: Awake and Alert      LABS:                        11.4   6.89  )-----------( 412      ( 01 Oct 2023 01:04 )             36.1       10-    140  |  105  |  21<H>  ----------------------------<  193<H>  4.3   |  31  |  0.73    Ca    9.0      01 Oct 2023 01:04    TPro  7.4  /  Alb  2.7<L>  /  TBili  0.2  /  DBili  x   /  AST  16  /  ALT  42  /  AlkPhos  98  10-01    PT/INR - ( 01 Oct 2023 01:04 )   PT: 11.4 sec;   INR: 1.00 ratio      PTT - ( 01 Oct 2023 01:04 )  PTT:34.9 sec        CAPILLARY BLOOD GLUCOSE  POCT Blood Glucose.: 169 mg/dL (01 Oct 2023 11:39)        Urinalysis Basic - ( 01 Oct 2023 03:04 )  Color: Yellow / Appearance: Clear / S.014 / pH: x  Gluc: x / Ketone: Negative mg/dL  / Bili: Negative / Urobili: 0.2 mg/dL   Blood: x / Protein: Negative mg/dL / Nitrite: Negative   Leuk Esterase: Small / RBC: 1 /HPF / WBC 2 /HPF   Sq Epi: x / Non Sq Epi: x / Bacteria: Few /HPF        MEDICATIONS  (STANDING):  aspirin enteric coated 81 milliGRAM(s) Oral daily  atorvastatin 80 milliGRAM(s) Oral at bedtime  influenza   Vaccine 0.5 milliLiter(s) IntraMuscular once  insulin glargine Injectable (LANTUS) 35 Unit(s) SubCutaneous at bedtime  insulin lispro (ADMELOG) corrective regimen sliding scale   SubCutaneous three times a day before meals  insulin lispro Injectable (ADMELOG) 12 Unit(s) SubCutaneous three times a day before meals  losartan 50 milliGRAM(s) Oral daily  pantoprazole    Tablet 40 milliGRAM(s) Oral before breakfast  piperacillin/tazobactam IVPB.. 3.375 Gram(s) IV Intermittent every 8 hours    MEDICATIONS  (PRN):        RADIOLOGY & ADDITIONAL TESTS:    10/1/23 : CT Angio Chest PE Protocol w/ IV Cont (10.01.23 @ 03:01) >  IMPRESSION:  No pulmonary embolism.    Left lateral base opacity may represent atelectasis or pneumonia. Recommend follow-up in 6 weeks for resolution.  Dependent atelectatic changes.      23 : Xray Chest 1 View- PORTABLE-Urgent (23 @ 23:43) >    IMPRESSION: Persistent left lower lobe opacity suspicious for pneumonia.      MICROBIOLOGY DATA:  COVID-19 PCR (10.01.23 @ 01:04)   COVID-19 PCR: NotDetec:

## 2023-10-01 NOTE — CONSULT NOTE ADULT - SUBJECTIVE AND OBJECTIVE BOX
Date of Service  10-01-23 @ 13:29    CHIEF COMPLAINT:Patient is a 52y old  Female who presents with a chief complaint of Pneumonia .      HPI:  This is a 51 y/o  female with pmhx  of DM, HTN,  recent acute CVA on baby aspirin and Plavix presenting with hemoptysis since Monday.  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  Reports having six episodes since being discharged home, mucus with streaks of blood. Endorses chest tightness and dizziness when standing.  Denies any shortness of breath, fevers, chills or epistaxis.  Denies any history of bronchiectasis, TB or embolism.     In ED  vitals: BP: 124/72, HR: 88, T: 36 on RA  f/u Ucx, Bcux   CTA no PE, LLL opacity   CXR: LLL opacity   (01 Oct 2023 10:35)      PAST MEDICAL & SURGICAL HISTORY:  Hypertension      Hyperlipidemia      Acute appendicitis      Hand fracture      Type 2 diabetes mellitus      Acute appendicitis          MEDICATIONS  (STANDING):  aspirin enteric coated 81 milliGRAM(s) Oral daily  atorvastatin 80 milliGRAM(s) Oral at bedtime  influenza   Vaccine 0.5 milliLiter(s) IntraMuscular once  insulin glargine Injectable (LANTUS) 35 Unit(s) SubCutaneous at bedtime  insulin lispro (ADMELOG) corrective regimen sliding scale   SubCutaneous three times a day before meals  insulin lispro Injectable (ADMELOG) 12 Unit(s) SubCutaneous three times a day before meals  losartan 50 milliGRAM(s) Oral daily  pantoprazole    Tablet 40 milliGRAM(s) Oral before breakfast  piperacillin/tazobactam IVPB.. 3.375 Gram(s) IV Intermittent every 8 hours    MEDICATIONS  (PRN):      FAMILY HISTORY:  Family history of diabetes mellitus (Mother)    Family history of hypertension (Mother)        SOCIAL HISTORY:    [x ] Non-smoker    [x ] Alcohol-denies    Allergies    NSAIDs (Short breath)  peppers (Rash)    Intolerances    	    REVIEW OF SYSTEMS:  CONSTITUTIONAL: No fever, weight loss, or fatigue  EYES: No eye pain, visual disturbances, or discharge  ENT:  No difficulty hearing, tinnitus, vertigo; No sinus or throat pain  NECK: No pain or stiffness  RESPIRATORY: No cough, wheezing, chills + hemoptysis; No Shortness of Breath  CARDIOVASCULAR: No chest pain, palpitations, passing out, dizziness, or leg swelling  GASTROINTESTINAL: No abdominal or epigastric pain. No nausea, vomiting, or hematemesis; No diarrhea or constipation. No melena or hematochezia.  GENITOURINARY: No dysuria, frequency, hematuria, or incontinence  NEUROLOGICAL: No headaches, memory loss, loss of strength, numbness, or tremors  SKIN: No itching, burning, rashes, or lesions   LYMPH Nodes: No enlarged glands  ENDOCRINE: No heat or cold intolerance; No hair loss  MUSCULOSKELETAL: No joint pain or swelling; No muscle, back, or extremity pain  PSYCHIATRIC: No depression, anxiety, mood swings, or difficulty sleeping  HEME/LYMPH: No easy bruising, or bleeding gums  ALLERGY AND IMMUNOLOGIC: No hives or eczema	      PHYSICAL EXAM:  T(C): 37.4 (10-01-23 @ 11:19), Max: 37.4 (10-01-23 @ 11:19)  HR: 81 (10-01-23 @ 11:19) (81 - 94)  BP: 142/72 (10-01-23 @ 11:19) (124/72 - 142/72)  RR: 17 (10-01-23 @ 11:19) (17 - 18)  SpO2: 95% (10-01-23 @ 11:19) (94% - 95%)  Wt(kg): --  I&O's Summary      Appearance: Normal	  HEENT:   Normal oral mucosa, PERRL, EOMI	  Lymphatic: No lymphadenopathy  Cardiovascular: Normal S1 S2, No JVD, No murmurs, No edema  Respiratory: B/L [facility]	  Psychiatry: A & O x 3, Mood & affect appropriate  Gastrointestinal:  Soft, Non-tender, + BS	  Skin: No rashes, No ecchymoses, No cyanosis	  Neurologic: Non-focal  Extremities: Normal range of motion, No clubbing, cyanosis or edema  Vascular: Peripheral pulses palpable 2+ bilaterally    	  	  	  LABS:	 	                                  11.4   6.89  )-----------( 412      ( 01 Oct 2023 01:04 )             36.1     10-01    140  |  105  |  21<H>  ----------------------------<  193<H>  4.3   |  31  |  0.73    Ca    9.0      01 Oct 2023 01:04    TPro  7.4  /  Alb  2.7<L>  /  TBili  0.2  /  DBili  x   /  AST  16  /  ALT  42  /  AlkPhos  98  10-01    < from: CT Angio Chest PE Protocol w/ IV Cont (10.01.23 @ 03:01) >  ACC: [id number] EXAM:  CT ANGIO CHEST PULM ART [facility]   ORDERED BY: JEAN ESPAÑA     PROCEDURE DATE:  10/01/2023          INTERPRETATION:  CLINICAL INFORMATION: Hemoptysis    COMPARISON: None.    CONTRAST/COMPLICATIONS:  IV Contrast: Omnipaque 350  90 cc administered   10 cc discarded  Oral Contrast: NONE  Complications: None reported at time of study completion    PROCEDURE:  CT Angiography of the Chest.  Sagittal and coronal reformats were performed as well as 3D (MIP)   reconstructions.    FINDINGS:    LUNGS AND AIRWAYS: Patent central airways.  At the lung bases, there are   scattered groundglass opacities in septal thickening. Left lateral lower   lobe dense opacity may represent atelectasis or pneumonia. Recommend   follow-up to resolution.  PLEURA: No pleural effusion.  MEDIASTINUM AND MILLA: No lymphadenopathy.  VESSELS: No pulmonary embolism. No aortic dissection. Coronary   atherosclerosis.  HEART: Heart size is normal. No pericardial effusion.  CHEST WALL AND LOWER NECK: Within normal limits.  VISUALIZED UPPER ABDOMEN: Within normal limits.  BONES: Within normal limits.    IMPRESSION:  No pulmonary embolism.    Left lateral base opacity may represent atelectasis or pneumonia.   Recommend follow-up in 6 weeks for resolution.  Dependent atelectatic changes.        --- End of Report ---            GISELA ORTEGA MD; Attending Radiologist  This document has been electronically signed. Oct  1 2023  3:43AM    < end of copied text >  < from: Transthoracic Echocardiogram (09.20.23 @ 07:17) >  OBSERVATIONS:  Mitral Valve: Normal mitral valve. Trace mitral  regurgitation.  Aortic Root: Normal aortic root.  Aortic Valve: Calcified trileaflet aortic valve with normal  opening.  Left Atrium: LA volume index = 7 cc/m2.  Left Ventricle: Endocardium not well visualized; grossly  normal left ventricular systolic function.   Segmental wall  motion could not be assessed. Normal left ventricular  internal dimensions and wall thicknesses.  Right Heart: Normal right atrium. Normal right ventricular  size and function. There is trace tricuspid regurgitation.  There is mild pulmonic regurgitation.  Pericardium/PleuraNormal pericardium with no pericardial  effusion.  Hemodynamic: RA Pressure is 10 mm Hg. RV systolic pressure  is 33 mm Hg. Agitated saline injection was negative for  intracardiac shunt.  ------------------------------------------------------------------------

## 2023-10-02 LAB
ANION GAP SERPL CALC-SCNC: 1 MMOL/L — LOW (ref 5–17)
BASOPHILS # BLD AUTO: 0.05 K/UL — SIGNIFICANT CHANGE UP (ref 0–0.2)
BASOPHILS NFR BLD AUTO: 0.9 % — SIGNIFICANT CHANGE UP (ref 0–2)
BLD GP AB SCN SERPL QL: SIGNIFICANT CHANGE UP
BUN SERPL-MCNC: 18 MG/DL — SIGNIFICANT CHANGE UP (ref 7–18)
CALCIUM SERPL-MCNC: 9.2 MG/DL — SIGNIFICANT CHANGE UP (ref 8.4–10.5)
CHLORIDE SERPL-SCNC: 105 MMOL/L — SIGNIFICANT CHANGE UP (ref 96–108)
CO2 SERPL-SCNC: 31 MMOL/L — SIGNIFICANT CHANGE UP (ref 22–31)
CREAT SERPL-MCNC: 0.68 MG/DL — SIGNIFICANT CHANGE UP (ref 0.5–1.3)
CULTURE RESULTS: SIGNIFICANT CHANGE UP
EGFR: 105 ML/MIN/1.73M2 — SIGNIFICANT CHANGE UP
EOSINOPHIL # BLD AUTO: 0.15 K/UL — SIGNIFICANT CHANGE UP (ref 0–0.5)
EOSINOPHIL NFR BLD AUTO: 2.8 % — SIGNIFICANT CHANGE UP (ref 0–6)
GLUCOSE BLDC GLUCOMTR-MCNC: 122 MG/DL — HIGH (ref 70–99)
GLUCOSE BLDC GLUCOMTR-MCNC: 144 MG/DL — HIGH (ref 70–99)
GLUCOSE BLDC GLUCOMTR-MCNC: 163 MG/DL — HIGH (ref 70–99)
GLUCOSE BLDC GLUCOMTR-MCNC: 318 MG/DL — HIGH (ref 70–99)
GLUCOSE SERPL-MCNC: 187 MG/DL — HIGH (ref 70–99)
HCT VFR BLD CALC: 36.3 % — SIGNIFICANT CHANGE UP (ref 34.5–45)
HGB BLD-MCNC: 11.4 G/DL — LOW (ref 11.5–15.5)
IMM GRANULOCYTES NFR BLD AUTO: 0.2 % — SIGNIFICANT CHANGE UP (ref 0–0.9)
LYMPHOCYTES # BLD AUTO: 2.35 K/UL — SIGNIFICANT CHANGE UP (ref 1–3.3)
LYMPHOCYTES # BLD AUTO: 44.2 % — HIGH (ref 13–44)
MAGNESIUM SERPL-MCNC: 1.6 MG/DL — SIGNIFICANT CHANGE UP (ref 1.6–2.6)
MCHC RBC-ENTMCNC: 28.2 PG — SIGNIFICANT CHANGE UP (ref 27–34)
MCHC RBC-ENTMCNC: 31.4 GM/DL — LOW (ref 32–36)
MCV RBC AUTO: 89.9 FL — SIGNIFICANT CHANGE UP (ref 80–100)
MONOCYTES # BLD AUTO: 0.42 K/UL — SIGNIFICANT CHANGE UP (ref 0–0.9)
MONOCYTES NFR BLD AUTO: 7.9 % — SIGNIFICANT CHANGE UP (ref 2–14)
NEUTROPHILS # BLD AUTO: 2.34 K/UL — SIGNIFICANT CHANGE UP (ref 1.8–7.4)
NEUTROPHILS NFR BLD AUTO: 44 % — SIGNIFICANT CHANGE UP (ref 43–77)
NIGHT BLUE STAIN TISS: SIGNIFICANT CHANGE UP
NRBC # BLD: 0 /100 WBCS — SIGNIFICANT CHANGE UP (ref 0–0)
PHOSPHATE SERPL-MCNC: 3.4 MG/DL — SIGNIFICANT CHANGE UP (ref 2.5–4.5)
PLATELET # BLD AUTO: 402 K/UL — HIGH (ref 150–400)
POTASSIUM SERPL-MCNC: 4.2 MMOL/L — SIGNIFICANT CHANGE UP (ref 3.5–5.3)
POTASSIUM SERPL-SCNC: 4.2 MMOL/L — SIGNIFICANT CHANGE UP (ref 3.5–5.3)
PROCALCITONIN SERPL-MCNC: 0.05 NG/ML — SIGNIFICANT CHANGE UP (ref 0.02–0.1)
RBC # BLD: 4.04 M/UL — SIGNIFICANT CHANGE UP (ref 3.8–5.2)
RBC # FLD: 12.4 % — SIGNIFICANT CHANGE UP (ref 10.3–14.5)
SODIUM SERPL-SCNC: 137 MMOL/L — SIGNIFICANT CHANGE UP (ref 135–145)
SPECIMEN SOURCE: SIGNIFICANT CHANGE UP
SPECIMEN SOURCE: SIGNIFICANT CHANGE UP
WBC # BLD: 5.32 K/UL — SIGNIFICANT CHANGE UP (ref 3.8–10.5)
WBC # FLD AUTO: 5.32 K/UL — SIGNIFICANT CHANGE UP (ref 3.8–10.5)

## 2023-10-02 RX ADMIN — ATORVASTATIN CALCIUM 80 MILLIGRAM(S): 80 TABLET, FILM COATED ORAL at 21:03

## 2023-10-02 RX ADMIN — Medication 12 UNIT(S): at 08:29

## 2023-10-02 RX ADMIN — Medication 12 UNIT(S): at 13:22

## 2023-10-02 RX ADMIN — PIPERACILLIN AND TAZOBACTAM 25 GRAM(S): 4; .5 INJECTION, POWDER, LYOPHILIZED, FOR SOLUTION INTRAVENOUS at 06:18

## 2023-10-02 RX ADMIN — INSULIN GLARGINE 35 UNIT(S): 100 INJECTION, SOLUTION SUBCUTANEOUS at 21:03

## 2023-10-02 RX ADMIN — LOSARTAN POTASSIUM 50 MILLIGRAM(S): 100 TABLET, FILM COATED ORAL at 06:18

## 2023-10-02 RX ADMIN — PIPERACILLIN AND TAZOBACTAM 25 GRAM(S): 4; .5 INJECTION, POWDER, LYOPHILIZED, FOR SOLUTION INTRAVENOUS at 17:50

## 2023-10-02 RX ADMIN — Medication 1: at 08:30

## 2023-10-02 RX ADMIN — Medication 81 MILLIGRAM(S): at 13:23

## 2023-10-02 RX ADMIN — PANTOPRAZOLE SODIUM 40 MILLIGRAM(S): 20 TABLET, DELAYED RELEASE ORAL at 06:18

## 2023-10-02 RX ADMIN — Medication 12 UNIT(S): at 17:31

## 2023-10-02 NOTE — PROGRESS NOTE ADULT - ASSESSMENT
Patient is a 52y old  Female with pmhx  of DM, HTN,  recent acute CVA on baby aspirin and Plavix, now presents to the ER for evaluation of hemoptysis since Monday.  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  Reports having six episodes since being discharged home, mucus with streaks of blood. Endorses chest tightness and dizziness when standing.  Denies any shortness of breath, fevers, chills or epistaxis.  Denies any history of bronchiectasis, TB or embolism. On admission, she found to have no fever, no Leukocytosis and Persistent left lower lobe opacity suspicious for pneumonia on CXR. She has started on Zosyn and the ID consult requested to assist with further evaluation and antibiotic  management.     # Pneumonia - CXR from 9/30 shows : Persistent left lower lobe opacity suspicious for pneumonia.  # Hemoptysis- R/O TB    would recommend:    1. Follow up sputum for AFB smears and culture  2. Please obtain MRSA PCR and Legionella urine AG  3. Continue Zosyn until work up is done  4. Agree with holding AC  5. Airborne Isolation until work up is done    d/w DR. Otto and patient    Attending Attestation:     Spent more than 45 minutes on total encounter, more than 50 % of the visit was spent counseling and/or coordinating care by the Attending physician.

## 2023-10-02 NOTE — PROGRESS NOTE ADULT - PROBLEM SELECTOR PLAN 2
- S/p CT chest showing LLL consolidation   - S/p Azithromycin & Rocephin.  - Currently on Zosyn  - Bld cx pending-f/u results   - ID-Dr. Mojica consulted, appreciated - S/p CT chest showing LLL consolidation   - S/p Azithromycin & Rocephin.  - Currently on Zosyn  - Bld & Ucx's pending-f/u results   - ID-Dr. Mojica consulted, appreciated - S/p CT chest showing LLL consolidation   - S/p Azithromycin & Rocephin.  - Currently on Zosyn  - Bld & Ucx's NGTD   - ID-Dr. Mojica consulted, appreciated - S/p CT chest showing LLL consolidation   - S/p Azithromycin & Rocephin.  - Currently on Zosyn  - Bld & Ucx's NGTD   - Mycoplasma p. pending-f/u results   - Legionella and strep not sent   - ID-Dr. Mojica consulted, appreciated

## 2023-10-02 NOTE — PROGRESS NOTE ADULT - SUBJECTIVE AND OBJECTIVE BOX
Patient is seen and examined at the bed side, is afebrile. The Hemoptysis is resolving, Plavix held by Primary team.      REVIEW OF SYSTEMS: All other review systems are negative      ALLERGIES: NSAIDs (Short breath)  peppers (Rash)      Vital Signs Last 24 Hrs  T(C): 37.2 (02 Oct 2023 14:23), Max: 37.2 (01 Oct 2023 22:34)  T(F): 99 (02 Oct 2023 14:23), Max: 99 (01 Oct 2023 22:34)  HR: 88 (02 Oct 2023 14:23) (84 - 93)  BP: 107/65 (02 Oct 2023 14:23) (107/65 - 138/78)  BP(mean): --  RR: 18 (02 Oct 2023 14:23) (18 - 18)  SpO2: 98% (02 Oct 2023 14:23) (95% - 98%)    Parameters below as of 02 Oct 2023 14:23  Patient On (Oxygen Delivery Method): room air        PHYSICAL EXAM:  GENERAL: Not in distress   CHEST/LUNG:  Not using accessory muscles   HEART: s1 and s2 present  ABDOMEN:  Nontender and  Nondistended  EXTREMITIES: No pedal  edema  CNS: Awake and Alert      LABS:                        11.4   5.32  )-----------( 402      ( 02 Oct 2023 07:05 )             36.3                           11.4   6.89  )-----------( 412      ( 01 Oct 2023 01:04 )             36.1         10-    137  |  105  |  18  ----------------------------<  187<H>  4.2   |  31  |  0.68    Ca    9.2      02 Oct 2023 07:05  Phos  3.4     10-  Mg     1.6     10-    TPro  7.4  /  Alb  2.7<L>  /  TBili  0.2  /  DBili  x   /  AST  16  /  ALT  42  /  AlkPhos  98  10-    10-    140  |  105  |  21<H>  ----------------------------<  193<H>  4.3   |  31  |  0.73    Ca    9.0      01 Oct 2023 01:04    TPro  7.4  /  Alb  2.7<L>  /  TBili  0.2  /  DBili  x   /  AST  16  /  ALT  42  /  AlkPhos  98  10-    PT/INR - ( 01 Oct 2023 01:04 )   PT: 11.4 sec;   INR: 1.00 ratio      PTT - ( 01 Oct 2023 01:04 )  PTT:34.9 sec        CAPILLARY BLOOD GLUCOSE  POCT Blood Glucose.: 169 mg/dL (01 Oct 2023 11:39)        Urinalysis Basic - ( 01 Oct 2023 03:04 )  Color: Yellow / Appearance: Clear / S.014 / pH: x  Gluc: x / Ketone: Negative mg/dL  / Bili: Negative / Urobili: 0.2 mg/dL   Blood: x / Protein: Negative mg/dL / Nitrite: Negative   Leuk Esterase: Small / RBC: 1 /HPF / WBC 2 /HPF   Sq Epi: x / Non Sq Epi: x / Bacteria: Few /HPF      Procalcitonin, Serum (10.02.23 @ 07:05): 0.05    MEDICATIONS  (STANDING):    aspirin enteric coated 81 milliGRAM(s) Oral daily  atorvastatin 80 milliGRAM(s) Oral at bedtime  influenza   Vaccine 0.5 milliLiter(s) IntraMuscular once  insulin glargine Injectable (LANTUS) 35 Unit(s) SubCutaneous at bedtime  insulin lispro (ADMELOG) corrective regimen sliding scale   SubCutaneous three times a day before meals  insulin lispro Injectable (ADMELOG) 12 Unit(s) SubCutaneous three times a day before meals  losartan 50 milliGRAM(s) Oral daily  pantoprazole    Tablet 40 milliGRAM(s) Oral before breakfast  piperacillin/tazobactam IVPB.. 3.375 Gram(s) IV Intermittent every 8 hours        RADIOLOGY & ADDITIONAL TESTS:    10/1/23 : CT Angio Chest PE Protocol w/ IV Cont (10.01.23 @ 03:01) IMPRESSION:  No pulmonary embolism.    Left lateral base opacity may represent atelectasis or pneumonia. Recommend follow-up in 6 weeks for resolution.  Dependent atelectatic changes.      23 : Xray Chest 1 View- PORTABLE-Urgent (23 @ 23:43) IMPRESSION: Persistent left lower lobe opacity suspicious for pneumonia.      MICROBIOLOGY DATA:    COVID-19 PCR (10.01.23 @ 01:04)   COVID-19 PCR: NotDetec:

## 2023-10-02 NOTE — PROGRESS NOTE ADULT - SUBJECTIVE AND OBJECTIVE BOX
Patient is a 52y old  Female who presents with a chief complaint of Hemoptysis (02 Oct 2023 10:26)  Awake, alert, comfortable in bed in NAD    INTERVAL HPI/OVERNIGHT EVENTS:      VITAL SIGNS:  T(F): 98.4 (10-02-23 @ 10:28)  HR: 84 (10-02-23 @ 10:28)  BP: 133/65 (10-02-23 @ 10:28)  RR: 18 (10-02-23 @ 10:28)  SpO2: 95% (10-02-23 @ 10:28)  Wt(kg): --  I&O's Detail          REVIEW OF SYSTEMS:    CONSTITUTIONAL:  No fevers, chills, sweats    HEENT:  Eyes:  No diplopia or blurred vision. ENT:  No earache, sore throat or runny nose.    CARDIOVASCULAR:  No pressure, squeezing, tightness, or heaviness about the chest; no palpitations.    RESPIRATORY:  Per HPI    GASTROINTESTINAL:  No abdominal pain, nausea, vomiting or diarrhea.    GENITOURINARY:  No dysuria, frequency or urgency.    NEUROLOGIC:  No paresthesias, fasciculations, seizures or weakness.    PSYCHIATRIC:  No disorder of thought or mood.      PHYSICAL EXAM:    Constitutional: Well developed and nourished  Eyes:Perrla  ENMT: normal  Neck:supple  Respiratory: good air entry  Cardiovascular: S1 S2 regular  Gastrointestinal: Soft, Non tender  Extremities: No edema  Vascular:normal  Neurological:Awake, alert,Ox3  Musculoskeletal:Normal      MEDICATIONS  (STANDING):  aspirin enteric coated 81 milliGRAM(s) Oral daily  atorvastatin 80 milliGRAM(s) Oral at bedtime  influenza   Vaccine 0.5 milliLiter(s) IntraMuscular once  insulin glargine Injectable (LANTUS) 35 Unit(s) SubCutaneous at bedtime  insulin lispro (ADMELOG) corrective regimen sliding scale   SubCutaneous three times a day before meals  insulin lispro Injectable (ADMELOG) 12 Unit(s) SubCutaneous three times a day before meals  losartan 50 milliGRAM(s) Oral daily  pantoprazole    Tablet 40 milliGRAM(s) Oral before breakfast  piperacillin/tazobactam IVPB.. 3.375 Gram(s) IV Intermittent every 8 hours    MEDICATIONS  (PRN):      Allergies    NSAIDs (Short breath)  peppers (Rash)    Intolerances        LABS:                        11.4   5.32  )-----------( 402      ( 02 Oct 2023 07:05 )             36.3     10-02    137  |  105  |  18  ----------------------------<  187<H>  4.2   |  31  |  0.68    Ca    9.2      02 Oct 2023 07:05  Phos  3.4     10-02  Mg     1.6     10-02    TPro  7.4  /  Alb  2.7<L>  /  TBili  0.2  /  DBili  x   /  AST  16  /  ALT  42  /  AlkPhos  98  10-01    PT/INR - ( 01 Oct 2023 01:04 )   PT: 11.4 sec;   INR: 1.00 ratio         PTT - ( 01 Oct 2023 01:04 )  PTT:34.9 sec  Urinalysis Basic - ( 02 Oct 2023 07:05 )    Color: x / Appearance: x / SG: x / pH: x  Gluc: 187 mg/dL / Ketone: x  / Bili: x / Urobili: x   Blood: x / Protein: x / Nitrite: x   Leuk Esterase: x / RBC: x / WBC x   Sq Epi: x / Non Sq Epi: x / Bacteria: x            CAPILLARY BLOOD GLUCOSE      POCT Blood Glucose.: 122 mg/dL (02 Oct 2023 11:52)  POCT Blood Glucose.: 163 mg/dL (02 Oct 2023 07:50)  POCT Blood Glucose.: 104 mg/dL (01 Oct 2023 22:26)  POCT Blood Glucose.: 107 mg/dL (01 Oct 2023 17:03)        RADIOLOGY & ADDITIONAL TESTS:    < from: CT Angio Chest PE Protocol w/ IV Cont (10.01.23 @ 03:01) >  IMPRESSION:  No pulmonary embolism.    Left lateral base opacity may represent atelectasis or pneumonia.   Recommend follow-up in 6 weeks for resolution.  Dependent atelectatic changes.      < end of copied text >  CXR:    Ct scan chest:    ekg;    echo:

## 2023-10-02 NOTE — PROGRESS NOTE ADULT - ASSESSMENT
52 year old,  female, from Home, with PMH of DM, HTN,  recent acute CVA on baby aspirin and Plavix.  Presented with hemoptysis since Monday.  Patient stated that symptom onset was during her hospital stay on september 26.  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  Patient reported having six episodes since being discharged home, mucus with streaks of blood.  Admitted for Hemoptysis r/o TB.        52 year old,  female, from home, with PMH of DM, HTN,  recent acute CVA on baby aspirin and Plavix.  Presented with hemoptysis since Monday.  Patient stated that symptom onset was during her hospital stay on september 26.  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  Patient reported having six episodes since being discharged home, mucus with streaks of blood.  Admitted for Hemoptysis r/o TB.

## 2023-10-02 NOTE — PROGRESS NOTE ADULT - SUBJECTIVE AND OBJECTIVE BOX
Date of Service 10-02-23 @ 10:27    CHIEF COMPLAINT:Patient is a 52y old  Female who presents with a chief complaint of Hemoptysis .Pt still having hemoptysis.    	  REVIEW OF SYSTEMS:  CONSTITUTIONAL: No fever, weight loss, or fatigue  EYES: No eye pain, visual disturbances, or discharge  ENT:  No difficulty hearing, tinnitus, vertigo; No sinus or throat pain  NECK: No pain or stiffness  RESPIRATORY: No cough, wheezing, chills or hemoptysis; No Shortness of Breath  CARDIOVASCULAR: No chest pain, palpitations, passing out, dizziness, or leg swelling  GASTROINTESTINAL: No abdominal or epigastric pain. No nausea, vomiting, or hematemesis; No diarrhea or constipation. No melena or hematochezia.  GENITOURINARY: No dysuria, frequency, hematuria, or incontinence  NEUROLOGICAL: No headaches, memory loss, loss of strength, numbness, or tremors  SKIN: No itching, burning, rashes, or lesions   LYMPH Nodes: No enlarged glands  ENDOCRINE: No heat or cold intolerance; No hair loss  MUSCULOSKELETAL: No joint pain or swelling; No muscle, back, or extremity pain  PSYCHIATRIC: No depression, anxiety, mood swings, or difficulty sleeping  HEME/LYMPH: No easy bruising, or bleeding gums  ALLERGY AND IMMUNOLOGIC: No hives or eczema	    PHYSICAL EXAM:  T(C): 36.5 (10-02-23 @ 06:21), Max: 37.4 (10-01-23 @ 11:19)  HR: 93 (10-02-23 @ 06:21) (81 - 93)  BP: 138/78 (10-02-23 @ 06:21) (124/65 - 142/72)  RR: 18 (10-02-23 @ 06:21) (17 - 18)  SpO2: 95% (10-02-23 @ 06:21) (94% - 96%)  Wt(kg): --  I&O's Summary      Appearance: Normal	  HEENT:   Normal oral mucosa, PERRL, EOMI	  Lymphatic: No lymphadenopathy  Cardiovascular: Normal S1 S2, No JVD, No murmurs, No edema  Respiratory: B/L ronchi  Psychiatry: A & O x 3, Mood & affect appropriate  Gastrointestinal:  Soft, Non-tender, + BS	  Skin: No rashes, No ecchymoses, No cyanosis	  Neurologic: Non-focal  Extremities: Normal range of motion, No clubbing, cyanosis or edema  Vascular: Peripheral pulses palpable 2+ bilaterally    MEDICATIONS  (STANDING):  aspirin enteric coated 81 milliGRAM(s) Oral daily  atorvastatin 80 milliGRAM(s) Oral at bedtime  influenza   Vaccine 0.5 milliLiter(s) IntraMuscular once  insulin glargine Injectable (LANTUS) 35 Unit(s) SubCutaneous at bedtime  insulin lispro (ADMELOG) corrective regimen sliding scale   SubCutaneous three times a day before meals  insulin lispro Injectable (ADMELOG) 12 Unit(s) SubCutaneous three times a day before meals  losartan 50 milliGRAM(s) Oral daily  pantoprazole    Tablet 40 milliGRAM(s) Oral before breakfast  piperacillin/tazobactam IVPB.. 3.375 Gram(s) IV Intermittent every 8 hours    	  	  LABS:	 	                       11.4   5.32  )-----------( 402      ( 02 Oct 2023 07:05 )             36.3     10-02    137  |  105  |  18  ----------------------------<  187<H>  4.2   |  31  |  0.68    Ca    9.2      02 Oct 2023 07:05  Phos  3.4     10-02  Mg     1.6     10-02    TPro  7.4  /  Alb  2.7<L>  /  TBili  0.2  /  DBili  x   /  AST  16  /  ALT  42  /  AlkPhos  98  10-01    Lipid Profile: Cholesterol 214  HDL 36    Ldl calc 153    TSH: Thyroid Stimulating Hormone, Serum: 0.59 uU/mL (09-21 @ 07:02)

## 2023-10-02 NOTE — PROGRESS NOTE ADULT - PROBLEM SELECTOR PLAN 1
R/o Atelectasis  check pending cultures  antibiotics  monitor temp and wbc  follow up CT chest in 6 weeks  incentive spirometry  Bronchodilators  chest pt

## 2023-10-02 NOTE — PROGRESS NOTE ADULT - SUBJECTIVE AND OBJECTIVE BOX
++++++++++++INCOMPLETE++++++++++++++++++++++++    NP Note discussed with  primary attending    Patient is a 52y old  Female who presents with a chief complaint of Hemoptysis (02 Oct 2023 12:22)      INTERVAL HPI/OVERNIGHT EVENTS: no new complaints    MEDICATIONS  (STANDING):  aspirin enteric coated 81 milliGRAM(s) Oral daily  atorvastatin 80 milliGRAM(s) Oral at bedtime  influenza   Vaccine 0.5 milliLiter(s) IntraMuscular once  insulin glargine Injectable (LANTUS) 35 Unit(s) SubCutaneous at bedtime  insulin lispro (ADMELOG) corrective regimen sliding scale   SubCutaneous three times a day before meals  insulin lispro Injectable (ADMELOG) 12 Unit(s) SubCutaneous three times a day before meals  losartan 50 milliGRAM(s) Oral daily  pantoprazole    Tablet 40 milliGRAM(s) Oral before breakfast  piperacillin/tazobactam IVPB.. 3.375 Gram(s) IV Intermittent every 8 hours    MEDICATIONS  (PRN):      __________________________________________________  REVIEW OF SYSTEMS:    CONSTITUTIONAL: No fever  EYES: No acute visual disturbances  NECK: No pain or stiffness  RESPIRATORY: No cough; No shortness of breath  CARDIOVASCULAR: No chest pain, no palpitations  GASTROINTESTINAL: No pain. No nausea or vomiting.  No diarrhea   NEUROLOGICAL: No headache or numbness, no tremors  MUSCULOSKELETAL: No joint pain, no muscle pain  GENITOURINARY: No dysuria, no frequency, no hesitancy  PSYCHIATRY: No depression , no anxiety  ALL OTHER  ROS negative        Vital Signs Last 24 Hrs  T(C): 36.9 (02 Oct 2023 10:28), Max: 37.2 (01 Oct 2023 22:34)  T(F): 98.4 (02 Oct 2023 10:28), Max: 99 (01 Oct 2023 22:34)  HR: 84 (02 Oct 2023 10:28) (84 - 93)  BP: 133/65 (02 Oct 2023 10:28) (124/65 - 138/78)  BP(mean): --  RR: 18 (02 Oct 2023 10:28) (18 - 18)  SpO2: 95% (02 Oct 2023 10:28) (94% - 96%)    Parameters below as of 02 Oct 2023 10:28  Patient On (Oxygen Delivery Method): room air        ________________________________________________  PHYSICAL EXAM:  GENERAL: NAD  HEENT: Normocephalic;  conjunctivae and sclerae clear; moist mucous membranes   NECK : Supple  CHEST/LUNG: Clear to auscultation bilaterally with good air entry   HEART: S1 S2  regular; no murmurs, gallops or rubs  ABDOMEN: Soft, Nontender, Nondistended; Bowel sounds present x 4 quad  EXTREMITIES: No cyanosis; no edema; no calf tenderness  SKIN: Warm and dry; no rash  NERVOUS SYSTEM:  Awake and alert; Oriented to place, person and time; no new deficits    _________________________________________________  LABS:                        11.4   5.32  )-----------( 402      ( 02 Oct 2023 07:05 )             36.3     10-02    137  |  105  |  18  ----------------------------<  187<H>  4.2   |  31  |  0.68    Ca    9.2      02 Oct 2023 07:05  Phos  3.4     10-02  Mg     1.6     10-02    TPro  7.4  /  Alb  2.7<L>  /  TBili  0.2  /  DBili  x   /  AST  16  /  ALT  42  /  AlkPhos  98  10-01    PT/INR - ( 01 Oct 2023 01:04 )   PT: 11.4 sec;   INR: 1.00 ratio         PTT - ( 01 Oct 2023 01:04 )  PTT:34.9 sec  Urinalysis Basic - ( 02 Oct 2023 07:05 )    Color: x / Appearance: x / SG: x / pH: x  Gluc: 187 mg/dL / Ketone: x  / Bili: x / Urobili: x   Blood: x / Protein: x / Nitrite: x   Leuk Esterase: x / RBC: x / WBC x   Sq Epi: x / Non Sq Epi: x / Bacteria: x      CAPILLARY BLOOD GLUCOSE      POCT Blood Glucose.: 122 mg/dL (02 Oct 2023 11:52)  POCT Blood Glucose.: 163 mg/dL (02 Oct 2023 07:50)  POCT Blood Glucose.: 104 mg/dL (01 Oct 2023 22:26)  POCT Blood Glucose.: 107 mg/dL (01 Oct 2023 17:03)        RADIOLOGY & ADDITIONAL TESTS:    Imaging Personally Reviewed:  YES/NO    Consultant(s) Notes Reviewed:   YES/ No    Care Discussed with Consultants :     Plan of care was discussed with patient and /or primary care giver; all questions and concerns were addressed and care was aligned with patient's wishes.     NP Note discussed with  primary attending    Patient is a 52y old  Female who presents with a chief complaint of Hemoptysis (02 Oct 2023 12:22)      INTERVAL HPI/OVERNIGHT EVENTS: Pt seen.  Reports a "little HA" but states she does not need medication b/c she does not want to become dependent.  Denies SOB, CP or abdominal pain.      MEDICATIONS  (STANDING):  aspirin enteric coated 81 milliGRAM(s) Oral daily  atorvastatin 80 milliGRAM(s) Oral at bedtime  influenza   Vaccine 0.5 milliLiter(s) IntraMuscular once  insulin glargine Injectable (LANTUS) 35 Unit(s) SubCutaneous at bedtime  insulin lispro (ADMELOG) corrective regimen sliding scale   SubCutaneous three times a day before meals  insulin lispro Injectable (ADMELOG) 12 Unit(s) SubCutaneous three times a day before meals  losartan 50 milliGRAM(s) Oral daily  pantoprazole    Tablet 40 milliGRAM(s) Oral before breakfast  piperacillin/tazobactam IVPB.. 3.375 Gram(s) IV Intermittent every 8 hours    MEDICATIONS  (PRN):      __________________________________________________  REVIEW OF SYSTEMS:    CONSTITUTIONAL: No fever  EYES: No acute visual disturbances  NECK: No pain or stiffness  RESPIRATORY: No cough; No shortness of breath  CARDIOVASCULAR: No chest pain, no palpitations  GASTROINTESTINAL: No pain. No nausea or vomiting.  No diarrhea   NEUROLOGICAL: No headache or numbness, no tremors  MUSCULOSKELETAL: No joint pain, no muscle pain  GENITOURINARY: No dysuria, no frequency, no hesitancy  PSYCHIATRY: No depression , no anxiety  ALL OTHER  ROS negative        Vital Signs Last 24 Hrs  T(C): 36.9 (02 Oct 2023 10:28), Max: 37.2 (01 Oct 2023 22:34)  T(F): 98.4 (02 Oct 2023 10:28), Max: 99 (01 Oct 2023 22:34)  HR: 84 (02 Oct 2023 10:28) (84 - 93)  BP: 133/65 (02 Oct 2023 10:28) (124/65 - 138/78)  BP(mean): --  RR: 18 (02 Oct 2023 10:28) (18 - 18)  SpO2: 95% (02 Oct 2023 10:28) (94% - 96%)    Parameters below as of 02 Oct 2023 10:28  Patient On (Oxygen Delivery Method): room air        ________________________________________________  PHYSICAL EXAM:  GENERAL: NAD  HEENT: Normocephalic;  conjunctivae and sclerae clear; moist mucous membranes   NECK : Supple  CHEST/LUNG: Clear to auscultation bilaterally with good air entry   HEART: S1 S2  regular; no murmurs, gallops or rubs  ABDOMEN: Soft, Nontender, Nondistended; Bowel sounds present x 4 quad  EXTREMITIES: No cyanosis; no edema; no calf tenderness  SKIN: Warm and dry; no rash  NERVOUS SYSTEM:  Awake and alert; Oriented to place, person and time; no new deficits    _________________________________________________  LABS:                        11.4   5.32  )-----------( 402      ( 02 Oct 2023 07:05 )             36.3     10-02    137  |  105  |  18  ----------------------------<  187<H>  4.2   |  31  |  0.68    Ca    9.2      02 Oct 2023 07:05  Phos  3.4     10-02  Mg     1.6     10-02    TPro  7.4  /  Alb  2.7<L>  /  TBili  0.2  /  DBili  x   /  AST  16  /  ALT  42  /  AlkPhos  98  10-01    PT/INR - ( 01 Oct 2023 01:04 )   PT: 11.4 sec;   INR: 1.00 ratio         PTT - ( 01 Oct 2023 01:04 )  PTT:34.9 sec  Urinalysis Basic - ( 02 Oct 2023 07:05 )    Color: x / Appearance: x / SG: x / pH: x  Gluc: 187 mg/dL / Ketone: x  / Bili: x / Urobili: x   Blood: x / Protein: x / Nitrite: x   Leuk Esterase: x / RBC: x / WBC x   Sq Epi: x / Non Sq Epi: x / Bacteria: x      CAPILLARY BLOOD GLUCOSE      POCT Blood Glucose.: 122 mg/dL (02 Oct 2023 11:52)  POCT Blood Glucose.: 163 mg/dL (02 Oct 2023 07:50)  POCT Blood Glucose.: 104 mg/dL (01 Oct 2023 22:26)  POCT Blood Glucose.: 107 mg/dL (01 Oct 2023 17:03)        RADIOLOGY & ADDITIONAL TESTS:    Imaging Personally Reviewed:  YES    Consultant(s) Notes Reviewed:   YES    Care Discussed with Consultants :     Plan of care was discussed with patient and /or primary care giver; all questions and concerns were addressed and care was aligned with patient's wishes.

## 2023-10-02 NOTE — PROGRESS NOTE ADULT - ASSESSMENT
51 y/o  female with pmhx  of DM, HTN,  recent acute CVA on baby aspirin and Plavix presenting with hemoptysis since Monday.  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  Reports having six episodes since being discharged home, mucus with streaks of blood,pneumonia.  1.Pneumonia-abx.  2.Pulm f/u, R/O TB.  3.HTN-cozaar.  4.CVA-asa, statin, plavix on hold.  5.DM-Insulin.  6.Lipid d/o-statin.  7.Gi and DVT prophylaxis.

## 2023-10-02 NOTE — PROGRESS NOTE ADULT - PROBLEM SELECTOR PLAN 1
- P/w Hemoptysis since september 26 x 6 episodes.  Hgb=11.4  - Ruling out TB  - S/p CTA negative for PE  - Awaiting AFB x3  - Maintain isolation precaution  - ID-Dr. Mojica consulted, appreciated    - Pulm-Dr. Hughes consulted, appreciated - P/w Hemoptysis since september 26 x 6 episodes.  Hgb=11.4  - Ruling out TB  - S/p CTA negative for PE  - Awaiting AFB x3.  AFB #1 negative   - Maintain isolation precaution  - ID-Dr. Mojica consulted, appreciated    - Pulm-Dr. Hughes consulted, appreciated

## 2023-10-03 LAB
ANION GAP SERPL CALC-SCNC: 6 MMOL/L — SIGNIFICANT CHANGE UP (ref 5–17)
BASOPHILS # BLD AUTO: 0.06 K/UL — SIGNIFICANT CHANGE UP (ref 0–0.2)
BASOPHILS NFR BLD AUTO: 1.2 % — SIGNIFICANT CHANGE UP (ref 0–2)
BUN SERPL-MCNC: 18 MG/DL — SIGNIFICANT CHANGE UP (ref 7–18)
CALCIUM SERPL-MCNC: 9.4 MG/DL — SIGNIFICANT CHANGE UP (ref 8.4–10.5)
CHLORIDE SERPL-SCNC: 102 MMOL/L — SIGNIFICANT CHANGE UP (ref 96–108)
CO2 SERPL-SCNC: 29 MMOL/L — SIGNIFICANT CHANGE UP (ref 22–31)
CREAT SERPL-MCNC: 0.79 MG/DL — SIGNIFICANT CHANGE UP (ref 0.5–1.3)
EGFR: 90 ML/MIN/1.73M2 — SIGNIFICANT CHANGE UP
EOSINOPHIL # BLD AUTO: 0.16 K/UL — SIGNIFICANT CHANGE UP (ref 0–0.5)
EOSINOPHIL NFR BLD AUTO: 3.1 % — SIGNIFICANT CHANGE UP (ref 0–6)
GLUCOSE BLDC GLUCOMTR-MCNC: 137 MG/DL — HIGH (ref 70–99)
GLUCOSE BLDC GLUCOMTR-MCNC: 215 MG/DL — HIGH (ref 70–99)
GLUCOSE BLDC GLUCOMTR-MCNC: 236 MG/DL — HIGH (ref 70–99)
GLUCOSE BLDC GLUCOMTR-MCNC: 84 MG/DL — SIGNIFICANT CHANGE UP (ref 70–99)
GLUCOSE SERPL-MCNC: 225 MG/DL — HIGH (ref 70–99)
GRAM STN FLD: SIGNIFICANT CHANGE UP
HCT VFR BLD CALC: 37.5 % — SIGNIFICANT CHANGE UP (ref 34.5–45)
HGB BLD-MCNC: 12 G/DL — SIGNIFICANT CHANGE UP (ref 11.5–15.5)
IMM GRANULOCYTES NFR BLD AUTO: 0.2 % — SIGNIFICANT CHANGE UP (ref 0–0.9)
LEGIONELLA AG UR QL: NEGATIVE — SIGNIFICANT CHANGE UP
LYMPHOCYTES # BLD AUTO: 2.85 K/UL — SIGNIFICANT CHANGE UP (ref 1–3.3)
LYMPHOCYTES # BLD AUTO: 55.8 % — HIGH (ref 13–44)
MAGNESIUM SERPL-MCNC: 1.6 MG/DL — SIGNIFICANT CHANGE UP (ref 1.6–2.6)
MCHC RBC-ENTMCNC: 28.2 PG — SIGNIFICANT CHANGE UP (ref 27–34)
MCHC RBC-ENTMCNC: 32 GM/DL — SIGNIFICANT CHANGE UP (ref 32–36)
MCV RBC AUTO: 88 FL — SIGNIFICANT CHANGE UP (ref 80–100)
MONOCYTES # BLD AUTO: 0.39 K/UL — SIGNIFICANT CHANGE UP (ref 0–0.9)
MONOCYTES NFR BLD AUTO: 7.6 % — SIGNIFICANT CHANGE UP (ref 2–14)
MRSA PCR RESULT.: SIGNIFICANT CHANGE UP
NEUTROPHILS # BLD AUTO: 1.64 K/UL — LOW (ref 1.8–7.4)
NEUTROPHILS NFR BLD AUTO: 32.1 % — LOW (ref 43–77)
NIGHT BLUE STAIN TISS: SIGNIFICANT CHANGE UP
NRBC # BLD: 0 /100 WBCS — SIGNIFICANT CHANGE UP (ref 0–0)
PHOSPHATE SERPL-MCNC: 3.5 MG/DL — SIGNIFICANT CHANGE UP (ref 2.5–4.5)
PLATELET # BLD AUTO: 407 K/UL — HIGH (ref 150–400)
POTASSIUM SERPL-MCNC: 3.9 MMOL/L — SIGNIFICANT CHANGE UP (ref 3.5–5.3)
POTASSIUM SERPL-SCNC: 3.9 MMOL/L — SIGNIFICANT CHANGE UP (ref 3.5–5.3)
RBC # BLD: 4.26 M/UL — SIGNIFICANT CHANGE UP (ref 3.8–5.2)
RBC # FLD: 12.5 % — SIGNIFICANT CHANGE UP (ref 10.3–14.5)
S AUREUS DNA NOSE QL NAA+PROBE: SIGNIFICANT CHANGE UP
S PNEUM AG UR QL: NEGATIVE — SIGNIFICANT CHANGE UP
SODIUM SERPL-SCNC: 137 MMOL/L — SIGNIFICANT CHANGE UP (ref 135–145)
SPECIMEN SOURCE: SIGNIFICANT CHANGE UP
SPECIMEN SOURCE: SIGNIFICANT CHANGE UP
WBC # BLD: 5.11 K/UL — SIGNIFICANT CHANGE UP (ref 3.8–10.5)
WBC # FLD AUTO: 5.11 K/UL — SIGNIFICANT CHANGE UP (ref 3.8–10.5)

## 2023-10-03 RX ADMIN — Medication 2: at 12:05

## 2023-10-03 RX ADMIN — Medication 2: at 09:13

## 2023-10-03 RX ADMIN — INSULIN GLARGINE 35 UNIT(S): 100 INJECTION, SOLUTION SUBCUTANEOUS at 22:48

## 2023-10-03 RX ADMIN — Medication 12 UNIT(S): at 12:04

## 2023-10-03 RX ADMIN — Medication 12 UNIT(S): at 09:05

## 2023-10-03 RX ADMIN — PIPERACILLIN AND TAZOBACTAM 25 GRAM(S): 4; .5 INJECTION, POWDER, LYOPHILIZED, FOR SOLUTION INTRAVENOUS at 09:08

## 2023-10-03 RX ADMIN — ATORVASTATIN CALCIUM 80 MILLIGRAM(S): 80 TABLET, FILM COATED ORAL at 22:48

## 2023-10-03 RX ADMIN — Medication 81 MILLIGRAM(S): at 12:04

## 2023-10-03 RX ADMIN — PANTOPRAZOLE SODIUM 40 MILLIGRAM(S): 20 TABLET, DELAYED RELEASE ORAL at 05:57

## 2023-10-03 RX ADMIN — PIPERACILLIN AND TAZOBACTAM 25 GRAM(S): 4; .5 INJECTION, POWDER, LYOPHILIZED, FOR SOLUTION INTRAVENOUS at 01:21

## 2023-10-03 RX ADMIN — LOSARTAN POTASSIUM 50 MILLIGRAM(S): 100 TABLET, FILM COATED ORAL at 05:57

## 2023-10-03 RX ADMIN — PIPERACILLIN AND TAZOBACTAM 25 GRAM(S): 4; .5 INJECTION, POWDER, LYOPHILIZED, FOR SOLUTION INTRAVENOUS at 16:44

## 2023-10-03 NOTE — PROGRESS NOTE ADULT - PROBLEM SELECTOR PLAN 1
- P/w Hemoptysis since september 26 x 6 episodes.  Hgb=11.4  - Ruling out TB  - S/p CTA negative for PE  - Awaiting AFB x3.  AFB #1 & #2 negative   - Maintain isolation precaution  - ID-Dr. Mojica consulted, appreciated    - Pulm-Dr. Hughes consulted, appreciated - P/w Hemoptysis since september 26 x 6 episodes.  Hgb=11.4  - Ruling out TB, less likely.  Most likely d/t Plavix, continue to hold Plavix.   - S/p CTA negative for PE  - Awaiting AFB x3.  AFB #1 & #2 negative   - Maintain isolation precaution  - ID-Dr. Mojica consulted, appreciated    - Pulm-Dr. Hughes consulted, appreciated

## 2023-10-03 NOTE — PROGRESS NOTE ADULT - SUBJECTIVE AND OBJECTIVE BOX
Patient is seen and examined at the bed side, is afebrile. The AFB smears are negative X 2 from 10/2/23.      REVIEW OF SYSTEMS: All other review systems are negative      ALLERGIES: NSAIDs (Short breath)  peppers (Rash)      Vital Signs Last 24 Hrs  T(C): 37.1 (03 Oct 2023 16:08), Max: 37.1 (03 Oct 2023 16:08)  T(F): 98.8 (03 Oct 2023 16:08), Max: 98.8 (03 Oct 2023 16:08)  HR: 91 (03 Oct 2023 16:08) (81 - 92)  BP: 123/71 (03 Oct 2023 16:08) (99/88 - 130/58)  BP(mean): 74 (03 Oct 2023 05:55) (74 - 74)  RR: 18 (03 Oct 2023 16:08) (18 - 18)  SpO2: 96% (03 Oct 2023 16:08) (94% - 96%)    Parameters below as of 03 Oct 2023 16:08  Patient On (Oxygen Delivery Method): room air        PHYSICAL EXAM:  GENERAL: Not in distress   CHEST/LUNG:  Not using accessory muscles   HEART: s1 and s2 present  ABDOMEN:  Nontender and  Nondistended  EXTREMITIES: No pedal  edema  CNS: Awake and Alert      LABS:                        12.0   5.11  )-----------( 407      ( 03 Oct 2023 06:49 )             37.5                           11.4   5.32  )-----------( 402      ( 02 Oct 2023 07:05 )             36.3         10-03    137  |  102  |  18  ----------------------------<  225<H>  3.9   |  29  |  0.79    Ca    9.4      03 Oct 2023 06:49  Phos  3.5     10-03  Mg     1.6     10-03      10-02    137  |  105  |  18  ----------------------------<  187<H>  4.2   |  31  |  0.68    Ca    9.2      02 Oct 2023 07:05  Phos  3.4     10-02  Mg     1.6     10-02    TPro  7.4  /  Alb  2.7<L>  /  TBili  0.2  /  DBili  x   /  AST  16  /  ALT  42  /  AlkPhos  98  10-    PT/INR - ( 01 Oct 2023 01:04 )   PT: 11.4 sec;   INR: 1.00 ratio      PTT - ( 01 Oct 2023 01:04 )  PTT:34.9 sec        CAPILLARY BLOOD GLUCOSE  POCT Blood Glucose.: 169 mg/dL (01 Oct 2023 11:39)        Urinalysis Basic - ( 01 Oct 2023 03:04 )  Color: Yellow / Appearance: Clear / S.014 / pH: x  Gluc: x / Ketone: Negative mg/dL  / Bili: Negative / Urobili: 0.2 mg/dL   Blood: x / Protein: Negative mg/dL / Nitrite: Negative   Leuk Esterase: Small / RBC: 1 /HPF / WBC 2 /HPF   Sq Epi: x / Non Sq Epi: x / Bacteria: Few /HPF      Procalcitonin, Serum (10.02.23 @ 07:05): 0.05    MEDICATIONS  (STANDING):    aspirin enteric coated 81 milliGRAM(s) Oral daily  atorvastatin 80 milliGRAM(s) Oral at bedtime  influenza   Vaccine 0.5 milliLiter(s) IntraMuscular once  insulin glargine Injectable (LANTUS) 35 Unit(s) SubCutaneous at bedtime  insulin lispro (ADMELOG) corrective regimen sliding scale   SubCutaneous three times a day before meals  insulin lispro Injectable (ADMELOG) 12 Unit(s) SubCutaneous three times a day before meals  losartan 50 milliGRAM(s) Oral daily  pantoprazole    Tablet 40 milliGRAM(s) Oral before breakfast  piperacillin/tazobactam IVPB.. 3.375 Gram(s) IV Intermittent every 8 hours        RADIOLOGY & ADDITIONAL TESTS:    10/1/23 : CT Angio Chest PE Protocol w/ IV Cont (10.01.23 @ 03:01) IMPRESSION:  No pulmonary embolism.    Left lateral base opacity may represent atelectasis or pneumonia. Recommend follow-up in 6 weeks for resolution.  Dependent atelectatic changes.      23 : Xray Chest 1 View- PORTABLE-Urgent (23 @ 23:43) IMPRESSION: Persistent left lower lobe opacity suspicious for pneumonia.      MICROBIOLOGY DATA:    Culture - Acid Fast - Sputum w/Smear . (10.02.23 @ 22:50)   Specimen Source: .Sputum Sputum  Acid Fast Bacilli Smear:   No acid-fast bacilli seen by fluorochrome stain    Culture - Acid Fast - Sputum w/Smear . (10.02.23 @ 06:30)   Specimen Source: .Sputum Sputum  Acid Fast Bacilli Smear:   No acid-fast bacilli seen by fluorochrome stain      MRSA/MSSA PCR (10.02.23 @ 19:50)   MRSA PCR Result.: NotDetec  Legionella pneumophila Antigen, Urine (10.02.23 @ 22:20)   Legionella Antigen, Urine: Negative    COVID-19 PCR (10.01.23 @ 01:04)   COVID-19 PCR: NotDetec:

## 2023-10-03 NOTE — PROGRESS NOTE ADULT - ASSESSMENT
52 year old,  female, from home, with PMH of DM, HTN,  recent acute CVA on baby aspirin and Plavix.  Presented with hemoptysis since Monday.  Patient stated that symptom onset was during her hospital stay on September 26.  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  Patient reported having six episodes since being discharged home, mucus with streaks of blood.  Admitted for Hemoptysis r/o TB.

## 2023-10-03 NOTE — CONSULT NOTE ADULT - ASSESSMENT
This is a 53 y/o  female with pmhx  of DM, HTN,  recent acute CVA on baby aspirin and Plavix presenting with hemoptysis since Monday.   Found to have uncont dm. Pt is on basal/bolus insulin as out pt.Admits to taking lantus 35 qhs and admelog 12-18 premeals with ? fsg low to mid 100ss. Denies hypoglycemia.

## 2023-10-03 NOTE — PROGRESS NOTE ADULT - SUBJECTIVE AND OBJECTIVE BOX
Patient is a 52y old  Female who presents with a chief complaint of Hemoptysis (03 Oct 2023 10:17)  Awake, alert, comfortable in bed in NAD. Doing well on RA    INTERVAL HPI/OVERNIGHT EVENTS:      VITAL SIGNS:  T(F): 98.2 (10-03-23 @ 05:55)  HR: 81 (10-03-23 @ 05:55)  BP: 127/57 (10-03-23 @ 05:55)  RR: 18 (10-03-23 @ 05:55)  SpO2: 94% (10-03-23 @ 05:55)  Wt(kg): --  I&O's Detail          REVIEW OF SYSTEMS:    CONSTITUTIONAL:  No fevers, chills, sweats    HEENT:  Eyes:  No diplopia or blurred vision. ENT:  No earache, sore throat or runny nose.    CARDIOVASCULAR:  No pressure, squeezing, tightness, or heaviness about the chest; no palpitations.    RESPIRATORY:  Per HPI    GASTROINTESTINAL:  No abdominal pain, nausea, vomiting or diarrhea.    GENITOURINARY:  No dysuria, frequency or urgency.    NEUROLOGIC:  No paresthesias, fasciculations, seizures or weakness.    PSYCHIATRIC:  No disorder of thought or mood.      PHYSICAL EXAM:    Constitutional: Well developed and nourished  Eyes:Perrla  ENMT: normal  Neck:supple  Respiratory: good air entry  Cardiovascular: S1 S2 regular  Gastrointestinal: Soft, Non tender  Extremities: No edema  Vascular:normal  Neurological:Awake, alert,Ox3  Musculoskeletal:Normal      MEDICATIONS  (STANDING):  aspirin enteric coated 81 milliGRAM(s) Oral daily  atorvastatin 80 milliGRAM(s) Oral at bedtime  influenza   Vaccine 0.5 milliLiter(s) IntraMuscular once  insulin glargine Injectable (LANTUS) 35 Unit(s) SubCutaneous at bedtime  insulin lispro (ADMELOG) corrective regimen sliding scale   SubCutaneous three times a day before meals  insulin lispro Injectable (ADMELOG) 12 Unit(s) SubCutaneous three times a day before meals  losartan 50 milliGRAM(s) Oral daily  pantoprazole    Tablet 40 milliGRAM(s) Oral before breakfast  piperacillin/tazobactam IVPB.. 3.375 Gram(s) IV Intermittent every 8 hours    MEDICATIONS  (PRN):      Allergies    NSAIDs (Short breath)  peppers (Rash)    Intolerances        LABS:                        12.0   5.11  )-----------( 407      ( 03 Oct 2023 06:49 )             37.5     10-03    137  |  102  |  18  ----------------------------<  225<H>  3.9   |  29  |  0.79    Ca    9.4      03 Oct 2023 06:49  Phos  3.5     10-03  Mg     1.6     10-03        Urinalysis Basic - ( 03 Oct 2023 06:49 )    Color: x / Appearance: x / SG: x / pH: x  Gluc: 225 mg/dL / Ketone: x  / Bili: x / Urobili: x   Blood: x / Protein: x / Nitrite: x   Leuk Esterase: x / RBC: x / WBC x   Sq Epi: x / Non Sq Epi: x / Bacteria: x    Culture - Urine (10.01.23 @ 03:05)   Specimen Source: Clean Catch Clean Catch (Midstream)  Culture Results:   <10,000 CFU/mL Normal Urogenital Esha    Culture - Blood (10.01.23 @ 00:40)   Specimen Source: .Blood Blood-Peripheral  Culture Results:   No growth at 48 HoursCulture - Blood (10.01.23 @ 00:30)   Specimen Source: .Blood Blood-Peripheral  Culture Results:   No growth at 48 Hours  Culture - Acid Fast - Sputum w/Smear . (10.02.23 @ 06:30)   Specimen Source: .Sputum Sputum  Acid Fast Bacilli Smear:   No acid-fast bacilli seen by fluorochrome stain      CAPILLARY BLOOD GLUCOSE      POCT Blood Glucose.: 215 mg/dL (03 Oct 2023 07:30)  POCT Blood Glucose.: 318 mg/dL (02 Oct 2023 20:46)  POCT Blood Glucose.: 144 mg/dL (02 Oct 2023 17:03)  POCT Blood Glucose.: 122 mg/dL (02 Oct 2023 11:52)        RADIOLOGY & ADDITIONAL TESTS:    CXR:    Ct scan chest:    ekg;    echo:

## 2023-10-03 NOTE — PROGRESS NOTE ADULT - PROBLEM SELECTOR PLAN 2
- S/p CT chest showing LLL consolidation   - S/p Azithromycin & Rocephin.  - Currently on Zosyn  - Bld & Ucx's NGTD   - Mycoplasma p. & Sputum cx pending-f/u results   - Legionella and Strep negative   - ID-Dr. Mojica consulted, appreciated - S/p CT chest showing LLL consolidation   - S/p Azithromycin & Rocephin.  - Currently on Zosyn  - Bld & Ucx's NGTD   - Mycoplasma p. & Sputum cx pending-f/u results   - MRSA, Legionella and Strep negative   - ID-Dr. Mojica consulted, appreciated

## 2023-10-03 NOTE — PROGRESS NOTE ADULT - SUBJECTIVE AND OBJECTIVE BOX
NP Note discussed with  primary attending    Patient is a 52y old  Female who presents with a chief complaint of Hemoptysis (03 Oct 2023 11:12)      INTERVAL HPI/OVERNIGHT EVENTS: no new complaints    MEDICATIONS  (STANDING):  aspirin enteric coated 81 milliGRAM(s) Oral daily  atorvastatin 80 milliGRAM(s) Oral at bedtime  influenza   Vaccine 0.5 milliLiter(s) IntraMuscular once  insulin glargine Injectable (LANTUS) 35 Unit(s) SubCutaneous at bedtime  insulin lispro (ADMELOG) corrective regimen sliding scale   SubCutaneous three times a day before meals  insulin lispro Injectable (ADMELOG) 12 Unit(s) SubCutaneous three times a day before meals  losartan 50 milliGRAM(s) Oral daily  pantoprazole    Tablet 40 milliGRAM(s) Oral before breakfast  piperacillin/tazobactam IVPB.. 3.375 Gram(s) IV Intermittent every 8 hours    MEDICATIONS  (PRN):      __________________________________________________  REVIEW OF SYSTEMS:    CONSTITUTIONAL: No fever  EYES: No acute visual disturbances  NECK: No pain or stiffness  RESPIRATORY: No cough; No shortness of breath  CARDIOVASCULAR: No chest pain, no palpitations  GASTROINTESTINAL: No pain. No nausea or vomiting.  No diarrhea   NEUROLOGICAL: No headache or numbness, no tremors  MUSCULOSKELETAL: No joint pain, no muscle pain  GENITOURINARY: No dysuria, no frequency, no hesitancy  PSYCHIATRY: No depression , no anxiety  ALL OTHER  ROS negative        Vital Signs Last 24 Hrs  T(C): 36.8 (03 Oct 2023 11:48), Max: 37.2 (02 Oct 2023 14:23)  T(F): 98.2 (03 Oct 2023 11:48), Max: 99 (02 Oct 2023 14:23)  HR: 91 (03 Oct 2023 11:48) (81 - 92)  BP: 99/88 (03 Oct 2023 11:48) (99/88 - 130/58)  BP(mean): 74 (03 Oct 2023 05:55) (74 - 74)  RR: 18 (03 Oct 2023 11:48) (18 - 18)  SpO2: 95% (03 Oct 2023 11:48) (94% - 98%)    Parameters below as of 03 Oct 2023 11:48  Patient On (Oxygen Delivery Method): room air        ________________________________________________  PHYSICAL EXAM:  GENERAL: NAD  HEENT: Normocephalic;  conjunctivae and sclerae clear; moist mucous membranes   NECK : Supple  CHEST/LUNG: Clear to auscultation bilaterally with good air entry   HEART: S1 S2  regular; no murmurs, gallops or rubs  ABDOMEN: Soft, Nontender, Nondistended; Bowel sounds present x 4 quad  EXTREMITIES: No cyanosis; no edema; no calf tenderness  SKIN: Warm and dry; no rash  NERVOUS SYSTEM:  Awake and alert; Oriented to place, person and time; no new deficits    _________________________________________________  LABS:                        12.0   5.11  )-----------( 407      ( 03 Oct 2023 06:49 )             37.5     10-03    137  |  102  |  18  ----------------------------<  225<H>  3.9   |  29  |  0.79    Ca    9.4      03 Oct 2023 06:49  Phos  3.5     10-03  Mg     1.6     10-03        Urinalysis Basic - ( 03 Oct 2023 06:49 )    Color: x / Appearance: x / SG: x / pH: x  Gluc: 225 mg/dL / Ketone: x  / Bili: x / Urobili: x   Blood: x / Protein: x / Nitrite: x   Leuk Esterase: x / RBC: x / WBC x   Sq Epi: x / Non Sq Epi: x / Bacteria: x      CAPILLARY BLOOD GLUCOSE      POCT Blood Glucose.: 236 mg/dL (03 Oct 2023 11:34)  POCT Blood Glucose.: 215 mg/dL (03 Oct 2023 07:30)  POCT Blood Glucose.: 318 mg/dL (02 Oct 2023 20:46)  POCT Blood Glucose.: 144 mg/dL (02 Oct 2023 17:03)        RADIOLOGY & ADDITIONAL TESTS:    Imaging Personally Reviewed:  YES    Consultant(s) Notes Reviewed:   YES    Care Discussed with Consultants :     Plan of care was discussed with patient and /or primary care giver; all questions and concerns were addressed and care was aligned with patient's wishes.     NP Note discussed with  primary attending    Patient is a 52y old  Female who presents with a chief complaint of Hemoptysis (03 Oct 2023 11:12)      INTERVAL HPI/OVERNIGHT EVENTS: Pt denies coughing or spitting blood today.  No new complaints or concerns.      MEDICATIONS  (STANDING):  aspirin enteric coated 81 milliGRAM(s) Oral daily  atorvastatin 80 milliGRAM(s) Oral at bedtime  influenza   Vaccine 0.5 milliLiter(s) IntraMuscular once  insulin glargine Injectable (LANTUS) 35 Unit(s) SubCutaneous at bedtime  insulin lispro (ADMELOG) corrective regimen sliding scale   SubCutaneous three times a day before meals  insulin lispro Injectable (ADMELOG) 12 Unit(s) SubCutaneous three times a day before meals  losartan 50 milliGRAM(s) Oral daily  pantoprazole    Tablet 40 milliGRAM(s) Oral before breakfast  piperacillin/tazobactam IVPB.. 3.375 Gram(s) IV Intermittent every 8 hours    MEDICATIONS  (PRN):      __________________________________________________  REVIEW OF SYSTEMS:    CONSTITUTIONAL: No fever  EYES: No acute visual disturbances  NECK: No pain or stiffness  RESPIRATORY: No cough; No shortness of breath  CARDIOVASCULAR: No chest pain, no palpitations  GASTROINTESTINAL: No pain. No nausea or vomiting.  No diarrhea   NEUROLOGICAL: No headache or numbness, no tremors  MUSCULOSKELETAL: No joint pain, no muscle pain  GENITOURINARY: No dysuria, no frequency, no hesitancy  PSYCHIATRY: No depression , no anxiety  ALL OTHER  ROS negative        Vital Signs Last 24 Hrs  T(C): 36.8 (03 Oct 2023 11:48), Max: 37.2 (02 Oct 2023 14:23)  T(F): 98.2 (03 Oct 2023 11:48), Max: 99 (02 Oct 2023 14:23)  HR: 91 (03 Oct 2023 11:48) (81 - 92)  BP: 99/88 (03 Oct 2023 11:48) (99/88 - 130/58)  BP(mean): 74 (03 Oct 2023 05:55) (74 - 74)  RR: 18 (03 Oct 2023 11:48) (18 - 18)  SpO2: 95% (03 Oct 2023 11:48) (94% - 98%)    Parameters below as of 03 Oct 2023 11:48  Patient On (Oxygen Delivery Method): room air        ________________________________________________  PHYSICAL EXAM:  GENERAL: NAD  HEENT: Normocephalic;  conjunctivae and sclerae clear; moist mucous membranes   NECK : Supple  CHEST/LUNG: Clear to auscultation bilaterally with good air entry   HEART: S1 S2  regular; no murmurs, gallops or rubs  ABDOMEN: Soft, Nontender, Nondistended; Bowel sounds present x 4 quad  EXTREMITIES: No cyanosis; no edema; no calf tenderness  SKIN: Warm and dry; no rash  NERVOUS SYSTEM:  Awake and alert; Oriented to place, person and time; no new deficits    _________________________________________________  LABS:                        12.0   5.11  )-----------( 407      ( 03 Oct 2023 06:49 )             37.5     10-03    137  |  102  |  18  ----------------------------<  225<H>  3.9   |  29  |  0.79    Ca    9.4      03 Oct 2023 06:49  Phos  3.5     10-03  Mg     1.6     10-03        Urinalysis Basic - ( 03 Oct 2023 06:49 )    Color: x / Appearance: x / SG: x / pH: x  Gluc: 225 mg/dL / Ketone: x  / Bili: x / Urobili: x   Blood: x / Protein: x / Nitrite: x   Leuk Esterase: x / RBC: x / WBC x   Sq Epi: x / Non Sq Epi: x / Bacteria: x      CAPILLARY BLOOD GLUCOSE      POCT Blood Glucose.: 236 mg/dL (03 Oct 2023 11:34)  POCT Blood Glucose.: 215 mg/dL (03 Oct 2023 07:30)  POCT Blood Glucose.: 318 mg/dL (02 Oct 2023 20:46)  POCT Blood Glucose.: 144 mg/dL (02 Oct 2023 17:03)        RADIOLOGY & ADDITIONAL TESTS:    Imaging Personally Reviewed:  YES    Consultant(s) Notes Reviewed:   YES    Care Discussed with Consultants :     Plan of care was discussed with patient and /or primary care giver; all questions and concerns were addressed and care was aligned with patient's wishes.

## 2023-10-03 NOTE — CONSULT NOTE ADULT - SUBJECTIVE AND OBJECTIVE BOX
Patient is a 52y old  Female who presents with a chief complaint of Hemoptysis (03 Oct 2023 06:41)      HPI:  This is a 53 y/o  female with pmhx  of DM, HTN,  recent acute CVA on baby aspirin and Plavix presenting with hemoptysis since Monday.  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  Reports having six episodes since being discharged home, mucus with streaks of blood. Endorses chest tightness and dizziness when standing.  Denies any shortness of breath, fevers, chills or epistaxis.  Denies any history of bronchiectasis, TB or embolism.     In ED  vitals: BP: 124/72, HR: 88, T: 36 on RA  f/u Ucx, Bcux   CTA no PE, LLL opacity   CXR: LLL opacity   (01 Oct 2023 10:35)      PAST MEDICAL & SURGICAL HISTORY:  Hypertension      Hyperlipidemia      Acute appendicitis      Hand fracture      Type 2 diabetes mellitus      Acute appendicitis             MEDICATIONS  (STANDING):  aspirin enteric coated 81 milliGRAM(s) Oral daily  atorvastatin 80 milliGRAM(s) Oral at bedtime  influenza   Vaccine 0.5 milliLiter(s) IntraMuscular once  insulin glargine Injectable (LANTUS) 35 Unit(s) SubCutaneous at bedtime  insulin lispro (ADMELOG) corrective regimen sliding scale   SubCutaneous three times a day before meals  insulin lispro Injectable (ADMELOG) 12 Unit(s) SubCutaneous three times a day before meals  losartan 50 milliGRAM(s) Oral daily  pantoprazole    Tablet 40 milliGRAM(s) Oral before breakfast  piperacillin/tazobactam IVPB.. 3.375 Gram(s) IV Intermittent every 8 hours    MEDICATIONS  (PRN):      FAMILY HISTORY:  Family history of diabetes mellitus (Mother)    Family history of hypertension (Mother)        SOCIAL HISTORY:      REVIEW OF SYSTEMS:  CONSTITUTIONAL: No fever, weight loss, or fatigue  EYES: No eye pain, visual disturbances, or discharge  ENT:  No difficulty hearing, tinnitus, vertigo; No sinus or throat pain  NECK: No pain or stiffness  RESPIRATORY: No cough, wheezing, chills or hemoptysis; No Shortness of Breath  CARDIOVASCULAR: No chest pain, palpitations, passing out, dizziness, or leg swelling  GASTROINTESTINAL: No abdominal or epigastric pain. No nausea, vomiting, or hematemesis; No diarrhea or constipation. No melena or hematochezia.  GENITOURINARY: No dysuria, frequency, hematuria, or incontinence  NEUROLOGICAL: No headaches, memory loss, loss of strength, numbness, or tremors  SKIN: No itching, burning, rashes, or lesions   LYMPH Nodes: No enlarged glands  ENDOCRINE: No heat or cold intolerance; No hair loss  MUSCULOSKELETAL: No joint pain or swelling; No muscle, back, or extremity pain  PSYCHIATRIC: No depression, anxiety, mood swings, or difficulty sleeping  HEME/LYMPH: No easy bruising, or bleeding gums  ALLERGY AND IMMUNOLOGIC: No hives or eczema	        Vital Signs Last 24 Hrs  T(C): 36.8 (03 Oct 2023 05:55), Max: 37.2 (02 Oct 2023 14:23)  T(F): 98.2 (03 Oct 2023 05:55), Max: 99 (02 Oct 2023 14:23)  HR: 81 (03 Oct 2023 05:55) (81 - 92)  BP: 127/57 (03 Oct 2023 05:55) (107/65 - 133/65)  BP(mean): 74 (03 Oct 2023 05:55) (74 - 74)  RR: 18 (03 Oct 2023 05:55) (18 - 18)  SpO2: 94% (03 Oct 2023 05:55) (94% - 98%)    Parameters below as of 03 Oct 2023 05:55  Patient On (Oxygen Delivery Method): room air          Constitutional:    NC/AT:    HEENT:    Neck:  No JVD, bruits or thyromegaly    Respiratory:  Clear without rales or rhonchi    Cardiovascular:  RR without murmur, rub or gallop.    Gastrointestinal: Soft without hepatosplenomegaly.    Extremities: without cyanosis, clubbing or edema.    Neurological:  Oriented   x      . No gross sensory or motor defects.        LABS:                        12.0   5.11  )-----------( 407      ( 03 Oct 2023 06:49 )             37.5     10-03    137  |  102  |  18  ----------------------------<  225<H>  3.9   |  29  |  0.79    Ca    9.4      03 Oct 2023 06:49  Phos  3.5     10-03  Mg     1.6     10-03            Urinalysis Basic - ( 03 Oct 2023 06:49 )    Color: x / Appearance: x / SG: x / pH: x  Gluc: 225 mg/dL / Ketone: x  / Bili: x / Urobili: x   Blood: x / Protein: x / Nitrite: x   Leuk Esterase: x / RBC: x / WBC x   Sq Epi: x / Non Sq Epi: x / Bacteria: x      CAPILLARY BLOOD GLUCOSE      POCT Blood Glucose.: 215 mg/dL (03 Oct 2023 07:30)  POCT Blood Glucose.: 318 mg/dL (02 Oct 2023 20:46)  POCT Blood Glucose.: 144 mg/dL (02 Oct 2023 17:03)  POCT Blood Glucose.: 122 mg/dL (02 Oct 2023 11:52)      RADIOLOGY & ADDITIONAL STUDIES: Patient is a 52y old  Female who presents with a chief complaint of Hemoptysis (03 Oct 2023 06:41)      HPI:  This is a 53 y/o  female with pmhx  of DM, HTN,  recent acute CVA on baby aspirin and Plavix presenting with hemoptysis since Monday.  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  Reports having six episodes since being discharged home, mucus with streaks of blood. Endorses chest tightness and dizziness when standing.  Denies any shortness of breath, fevers, chills or epistaxis.  Denies any history of bronchiectasis, TB or embolism. Found to have uncont dm. Pt is on basal/bolus insulin as out pt.Admits to taking lantus 35 qhs and admelog 12-18 premeals with ? fsg low to mid 100ss. Denies hypoglycemia.    In ED  vitals: BP: 124/72, HR: 88, T: 36 on RA  f/u Ucx, Bcux   CTA no PE, LLL opacity   CXR: LLL opacity   (01 Oct 2023 10:35)      PAST MEDICAL & SURGICAL HISTORY:  to mid 100s      Hyperlipidemia      Acute appendicitis      Hand fracture      Type 2 diabetes mellitus      Acute appendicitis             MEDICATIONS  (STANDING):  aspirin enteric coated 81 milliGRAM(s) Oral daily  atorvastatin 80 milliGRAM(s) Oral at bedtime  influenza   Vaccine 0.5 milliLiter(s) IntraMuscular once  insulin glargine Injectable (LANTUS) 35 Unit(s) SubCutaneous at bedtime  insulin lispro (ADMELOG) corrective regimen sliding scale   SubCutaneous three times a day before meals  insulin lispro Injectable (ADMELOG) 12 Unit(s) SubCutaneous three times a day before meals  losartan 50 milliGRAM(s) Oral daily  pantoprazole    Tablet 40 milliGRAM(s) Oral before breakfast  piperacillin/tazobactam IVPB.. 3.375 Gram(s) IV Intermittent every 8 hours    MEDICATIONS  (PRN):      FAMILY HISTORY:  Family history of diabetes mellitus (Mother)    Family history of hypertension (Mother)        SOCIAL HISTORY:      REVIEW OF SYSTEMS:  CONSTITUTIONAL: No fever, weight loss, or fatigue  EYES: No eye pain, visual disturbances, or discharge  ENT:  No difficulty hearing, tinnitus, vertigo; No sinus or throat pain  NECK: No pain or stiffness  RESPIRATORY: No cough, wheezing, chills or hemoptysis; No Shortness of Breath  CARDIOVASCULAR: No chest pain, palpitations, passing out, dizziness, or leg swelling  GASTROINTESTINAL: No abdominal or epigastric pain. No nausea, vomiting, or hematemesis; No diarrhea or constipation. No melena or hematochezia.  GENITOURINARY: No dysuria, frequency, hematuria, or incontinence  NEUROLOGICAL: No headaches, memory loss, loss of strength, numbness, or tremors  SKIN: No itching, burning, rashes, or lesions   LYMPH Nodes: No enlarged glands  ENDOCRINE: No heat or cold intolerance; No hair loss  MUSCULOSKELETAL: No joint pain or swelling; No muscle, back, or extremity pain  PSYCHIATRIC: No depression, anxiety, mood swings, or difficulty sleeping  HEME/LYMPH: No easy bruising, or bleeding gums  ALLERGY AND IMMUNOLOGIC: No hives or eczema	        Vital Signs Last 24 Hrs  T(C): 36.8 (03 Oct 2023 05:55), Max: 37.2 (02 Oct 2023 14:23)  T(F): 98.2 (03 Oct 2023 05:55), Max: 99 (02 Oct 2023 14:23)  HR: 81 (03 Oct 2023 05:55) (81 - 92)  BP: 127/57 (03 Oct 2023 05:55) (107/65 - 133/65)  BP(mean): 74 (03 Oct 2023 05:55) (74 - 74)  RR: 18 (03 Oct 2023 05:55) (18 - 18)  SpO2: 94% (03 Oct 2023 05:55) (94% - 98%)    Parameters below as of 03 Oct 2023 05:55  Patient On (Oxygen Delivery Method): room air          Constitutional:    HEENT: nad    Neck:  No JVD, bruits or thyromegaly    Respiratory:  Clear without rales or rhonchi    Cardiovascular:  RR without murmur, rub or gallop.    Gastrointestinal: Soft without hepatosplenomegaly.    Extremities: without cyanosis, clubbing or edema.    Neurological:  Oriented   x 3     . No gross sensory or motor defects.        LABS:                        12.0   5.11  )-----------( 407      ( 03 Oct 2023 06:49 )             37.5     10-03    137  |  102  |  18  ----------------------------<  225<H>  3.9   |  29  |  0.79    Ca    9.4      03 Oct 2023 06:49  Phos  3.5     10-03  Mg     1.6     10-03            Urinalysis Basic - ( 03 Oct 2023 06:49 )    Color: x / Appearance: x / SG: x / pH: x  Gluc: 225 mg/dL / Ketone: x  / Bili: x / Urobili: x   Blood: x / Protein: x / Nitrite: x   Leuk Esterase: x / RBC: x / WBC x   Sq Epi: x / Non Sq Epi: x / Bacteria: x      CAPILLARY BLOOD GLUCOSE      POCT Blood Glucose.: 215 mg/dL (03 Oct 2023 07:30)  POCT Blood Glucose.: 318 mg/dL (02 Oct 2023 20:46)  POCT Blood Glucose.: 144 mg/dL (02 Oct 2023 17:03)  POCT Blood Glucose.: 122 mg/dL (02 Oct 2023 11:52)      RADIOLOGY & ADDITIONAL STUDIES:

## 2023-10-03 NOTE — CONSULT NOTE ADULT - PROBLEM SELECTOR RECOMMENDATION 9
uncont with hyperglycemia  check a1c level  agree with lantus 35 units  admelog 12 ac tid- hold if npo  fsg ac and hs  nutrition eval  d/w prim team
panculture  antibiotics  monitor WBC and temp  follow up CXR  R/o Atelectasis  incentive spirometry  Bronchodilators  chest pt

## 2023-10-03 NOTE — PROGRESS NOTE ADULT - ASSESSMENT
53 y/o  female with pmhx  of DM, HTN,  recent acute CVA on baby aspirin and Plavix presenting with hemoptysis since Monday.  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  Reports having six episodes since being discharged home, mucus with streaks of blood,pneumonia.  1.Pneumonia-abx.  2.Pulm f/u, R/O TB.  3.HTN-cozaar.  4.CVA-asa, statin, plavix on hold.  5.DM-Insulin.  6.Lipid d/o-statin.  7.Gi and DVT prophylaxis.

## 2023-10-03 NOTE — PROGRESS NOTE ADULT - SUBJECTIVE AND OBJECTIVE BOX
Date of Service 10-03-23 @ 11:12    CHIEF COMPLAINT:Patient is a 52y old  Female who presents with a chief complaint of Hemoptysis (03 Oct 2023 10:50)    	  REVIEW OF SYSTEMS:  CONSTITUTIONAL: No fever, weight loss, or fatigue  EYES: No eye pain, visual disturbances, or discharge  ENT:  No difficulty hearing, tinnitus, vertigo; No sinus or throat pain  NECK: No pain or stiffness  RESPIRATORY: No cough, wheezing, chills or hemoptysis; No Shortness of Breath  CARDIOVASCULAR: No chest pain, palpitations, passing out, dizziness, or leg swelling  GASTROINTESTINAL: No abdominal or epigastric pain. No nausea, vomiting, or hematemesis; No diarrhea or constipation. No melena or hematochezia.  GENITOURINARY: No dysuria, frequency, hematuria, or incontinence  NEUROLOGICAL: No headaches, memory loss, loss of strength, numbness, or tremors  SKIN: No itching, burning, rashes, or lesions   LYMPH Nodes: No enlarged glands  ENDOCRINE: No heat or cold intolerance; No hair loss  MUSCULOSKELETAL: No joint pain or swelling; No muscle, back, or extremity pain  PSYCHIATRIC: No depression, anxiety, mood swings, or difficulty sleeping  HEME/LYMPH: No easy bruising, or bleeding gums  ALLERGY AND IMMUNOLOGIC: No hives or eczema	      PHYSICAL EXAM:  T(C): 36.8 (10-03-23 @ 05:55), Max: 37.2 (10-02-23 @ 14:23)  HR: 81 (10-03-23 @ 05:55) (81 - 92)  BP: 127/57 (10-03-23 @ 05:55) (107/65 - 130/58)  RR: 18 (10-03-23 @ 05:55) (18 - 18)  SpO2: 94% (10-03-23 @ 05:55) (94% - 98%)  Wt(kg): --  I&O's Summary      Appearance: Normal	  HEENT:   Normal oral mucosa, PERRL, EOMI	  Lymphatic: No lymphadenopathy  Cardiovascular: Normal S1 S2, No JVD, No murmurs, No edema  Respiratory: Lungs clear to auscultation	  Psychiatry: A & O x 3, Mood & affect appropriate  Gastrointestinal:  Soft, Non-tender, + BS	  Skin: No rashes, No ecchymoses, No cyanosis	  Neurologic: Non-focal  Extremities: Normal range of motion, No clubbing, cyanosis or edema  Vascular: Peripheral pulses palpable 2+ bilaterally    MEDICATIONS  (STANDING):  aspirin enteric coated 81 milliGRAM(s) Oral daily  atorvastatin 80 milliGRAM(s) Oral at bedtime  influenza   Vaccine 0.5 milliLiter(s) IntraMuscular once  insulin glargine Injectable (LANTUS) 35 Unit(s) SubCutaneous at bedtime  insulin lispro (ADMELOG) corrective regimen sliding scale   SubCutaneous three times a day before meals  insulin lispro Injectable (ADMELOG) 12 Unit(s) SubCutaneous three times a day before meals  losartan 50 milliGRAM(s) Oral daily  pantoprazole    Tablet 40 milliGRAM(s) Oral before breakfast  piperacillin/tazobactam IVPB.. 3.375 Gram(s) IV Intermittent every 8 hours      	  	  LABS:	 	                       12.0   5.11  )-----------( 407      ( 03 Oct 2023 06:49 )             37.5     10-03    137  |  102  |  18  ----------------------------<  225<H>  3.9   |  29  |  0.79    Ca    9.4      03 Oct 2023 06:49  Phos  3.5     10-03  Mg     1.6     10-03        Lipid Profile: Cholesterol 214  HDL 36    Ldl calc 153    TSH: Thyroid Stimulating Hormone, Serum: 0.59 uU/mL (09-21 @ 07:02)

## 2023-10-03 NOTE — PROGRESS NOTE ADULT - ASSESSMENT
Patient is a 52y old  Female with pmhx  of DM, HTN,  recent acute CVA on baby aspirin and Plavix, now presents to the ER for evaluation of hemoptysis since Monday.  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  Reports having six episodes since being discharged home, mucus with streaks of blood. Endorses chest tightness and dizziness when standing.  Denies any shortness of breath, fevers, chills or epistaxis.  Denies any history of bronchiectasis, TB or embolism. On admission, she found to have no fever, no Leukocytosis and Persistent left lower lobe opacity suspicious for pneumonia on CXR. She has started on Zosyn and the ID consult requested to assist with further evaluation and antibiotic  management.     # Pneumonia - CXR from 9/30 shows : Persistent left lower lobe opacity suspicious for pneumonia.- MRSA PCR and Legionella urine AG are negative  # Hemoptysis- R/O TB - Two AFB smears are negative from 10/2/23    would recommend:    1. Follow up sputum for AFB smears and culture X 1 more  2. Continue Zosyn until work up is done  3. Agree with holding AC  4. Airborne Isolation until work up is done    d/w Covering Treence NAIR and patient    Attending Attestation:     Spent more than 35 minutes on total encounter, more than 50 % of the visit was spent counseling and/or coordinating care by the Attending physician.

## 2023-10-03 NOTE — PROGRESS NOTE ADULT - PROBLEM SELECTOR PLAN 1
R/o Atelectasis  cultures noted  AFB smear neg x one  Remains on airborne isolation  Check Quantiferon TB Gold test  antibiotics  monitor temp and wbc  follow up CT chest in 6 weeks  incentive spirometry  Bronchodilators  chest pt

## 2023-10-03 NOTE — PROGRESS NOTE ADULT - SUBJECTIVE AND OBJECTIVE BOX
Patient is a 52y old  Female who presents with a chief complaint of Hemoptysis (02 Oct 2023 14:02)    pt seen in icu [  ], reg med floor [  x ], bed [x  ], chair at bedside [   ], a+o x3 [x  ], lethargic [  ],    nad [ x ]      Allergies    NSAIDs (Short breath)  peppers (Rash)        Vitals    T(F): 98.2 (10-03-23 @ 05:55), Max: 99 (10-02-23 @ 14:23)  HR: 81 (10-03-23 @ 05:55) (81 - 92)  BP: 127/57 (10-03-23 @ 05:55) (107/65 - 133/65)  RR: 18 (10-03-23 @ 05:55) (18 - 18)  SpO2: 94% (10-03-23 @ 05:55) (94% - 98%)  Wt(kg): --  CAPILLARY BLOOD GLUCOSE      POCT Blood Glucose.: 318 mg/dL (02 Oct 2023 20:46)      Labs                          11.4   5.32  )-----------( 402      ( 02 Oct 2023 07:05 )             36.3       10-02    137  |  105  |  18  ----------------------------<  187<H>  4.2   |  31  |  0.68    Ca    9.2      02 Oct 2023 07:05  Phos  3.4     10-02  Mg     1.6     10-02              .Sputum Sputum  10-02 @ 06:30 --  --  --      Clean Catch Clean Catch (Midstream)  10-01 @ 03:05   <10,000 CFU/mL Normal Urogenital Esha  --  --      .Blood Blood-Peripheral  10-01 @ 00:40   No growth at 24 hours  --  --      .Blood Blood-Peripheral  10-01 @ 00:30   No growth at 24 hours  --  --          Radiology Results      Meds    MEDICATIONS  (STANDING):  aspirin enteric coated 81 milliGRAM(s) Oral daily  atorvastatin 80 milliGRAM(s) Oral at bedtime  influenza   Vaccine 0.5 milliLiter(s) IntraMuscular once  insulin glargine Injectable (LANTUS) 35 Unit(s) SubCutaneous at bedtime  insulin lispro (ADMELOG) corrective regimen sliding scale   SubCutaneous three times a day before meals  insulin lispro Injectable (ADMELOG) 12 Unit(s) SubCutaneous three times a day before meals  losartan 50 milliGRAM(s) Oral daily  pantoprazole    Tablet 40 milliGRAM(s) Oral before breakfast  piperacillin/tazobactam IVPB.. 3.375 Gram(s) IV Intermittent every 8 hours      MEDICATIONS  (PRN):      Physical Exam    Neuro :  no focal deficits  Respiratory: CTA B/L  CV: RRR, S1S2, no murmurs,   Abdominal: Soft, NT, ND +BS,  Extremities: No edema, + peripheral pulses      ASSESSMENT    pneumonia with hemoptysis,   pe r/o,    h/o DM,   HTN,    recent acute CVA on baby aspirin and Plavix  Hyperlipidemia      PLAN    hemoptysis resolving   f/u Procalcitonin level ,   id f/u   f/u blood cx  Obtain sputum for AFB smear and culture  obtain MRSA PCR and Legionella urine AG  Continue Zosyn until work up is done  pulm f/u   incentive spirometer  atrov and prov nebs,   supplemental oxygen,   hold plavix,    cardio f/u   cont asa, statin  lantus 35 qhs,   lispro 12 actid,   lispro ss,   cont current meds       Patient is a 52y old  Female who presents with a chief complaint of Hemoptysis (02 Oct 2023 14:02)    pt seen in icu [  ], reg med floor [  x ], bed [x  ], chair at bedside [   ], a+o x3 [x  ], lethargic [  ],    nad [ x ]      Allergies    NSAIDs (Short breath)  peppers (Rash)        Vitals    T(F): 98.2 (10-03-23 @ 05:55), Max: 99 (10-02-23 @ 14:23)  HR: 81 (10-03-23 @ 05:55) (81 - 92)  BP: 127/57 (10-03-23 @ 05:55) (107/65 - 133/65)  RR: 18 (10-03-23 @ 05:55) (18 - 18)  SpO2: 94% (10-03-23 @ 05:55) (94% - 98%)  Wt(kg): --  CAPILLARY BLOOD GLUCOSE      POCT Blood Glucose.: 318 mg/dL (02 Oct 2023 20:46)      Labs                          11.4   5.32  )-----------( 402      ( 02 Oct 2023 07:05 )             36.3       10-02    137  |  105  |  18  ----------------------------<  187<H>  4.2   |  31  |  0.68    Ca    9.2      02 Oct 2023 07:05  Phos  3.4     10-02  Mg     1.6     10-02      MRSA/MSSA PCR (10.02.23 @ 19:50)   MRSA PCR Result.: NotDetec:  Staph aureus PCR Result: NotDetec    Legionella pneumophila Antigen, Urine (10.02.23 @ 22:20)   Legionella Antigen, Urine: Negative:       .Sputum Sputum  10-02 @ 06:30 --  --  --    Acid Fast Bacilli Smear:   No acid-fast bacilli seen by fluorochrome stain  Clean Catch Clean Catch (Midstream)  10-01 @ 03:05   <10,000 CFU/mL Normal Urogenital Esha  --  --      .Blood Blood-Peripheral  10-01 @ 00:40   No growth at 24 hours  --  --      .Blood Blood-Peripheral  10-01 @ 00:30   No growth at 24 hours  --  --          Radiology Results      Meds    MEDICATIONS  (STANDING):  aspirin enteric coated 81 milliGRAM(s) Oral daily  atorvastatin 80 milliGRAM(s) Oral at bedtime  influenza   Vaccine 0.5 milliLiter(s) IntraMuscular once  insulin glargine Injectable (LANTUS) 35 Unit(s) SubCutaneous at bedtime  insulin lispro (ADMELOG) corrective regimen sliding scale   SubCutaneous three times a day before meals  insulin lispro Injectable (ADMELOG) 12 Unit(s) SubCutaneous three times a day before meals  losartan 50 milliGRAM(s) Oral daily  pantoprazole    Tablet 40 milliGRAM(s) Oral before breakfast  piperacillin/tazobactam IVPB.. 3.375 Gram(s) IV Intermittent every 8 hours      MEDICATIONS  (PRN):      Physical Exam    Neuro :  no focal deficits  Respiratory: CTA B/L  CV: RRR, S1S2, no murmurs,   Abdominal: Soft, NT, ND +BS,  Extremities: No edema, + peripheral pulses      ASSESSMENT    pneumonia with hemoptysis,   pe r/o,    h/o DM,   HTN,    recent acute CVA on baby aspirin and Plavix  Hyperlipidemia      PLAN    hemoptysis resolved   f/u Procalcitonin level ,   id f/u   blood cx neg noted above   MRSA PCR neg noted above   Legionella urine AG neg noted above  AFB sputum smear x1 neg noted above   f/u afb cx culture   id f/u  Airborne Isolation   Continue Zosyn until work up is done  pulm f/u   incentive spirometer  atrov and prov nebs,   supplemental oxygen,   follow up CT chest in 6 weeks  hold plavix,    cardio f/u   cont asa, statin  lantus 35 qhs,   lispro 12 actid,   lispro ss,   cont current meds

## 2023-10-04 ENCOUNTER — TRANSCRIPTION ENCOUNTER (OUTPATIENT)
Age: 53
End: 2023-10-04

## 2023-10-04 LAB
ANION GAP SERPL CALC-SCNC: 7 MMOL/L — SIGNIFICANT CHANGE UP (ref 5–17)
BASOPHILS # BLD AUTO: 0.06 K/UL — SIGNIFICANT CHANGE UP (ref 0–0.2)
BASOPHILS NFR BLD AUTO: 1.1 % — SIGNIFICANT CHANGE UP (ref 0–2)
BUN SERPL-MCNC: 18 MG/DL — SIGNIFICANT CHANGE UP (ref 7–18)
CALCIUM SERPL-MCNC: 9.3 MG/DL — SIGNIFICANT CHANGE UP (ref 8.4–10.5)
CHLORIDE SERPL-SCNC: 103 MMOL/L — SIGNIFICANT CHANGE UP (ref 96–108)
CO2 SERPL-SCNC: 27 MMOL/L — SIGNIFICANT CHANGE UP (ref 22–31)
CREAT SERPL-MCNC: 0.76 MG/DL — SIGNIFICANT CHANGE UP (ref 0.5–1.3)
EGFR: 94 ML/MIN/1.73M2 — SIGNIFICANT CHANGE UP
EOSINOPHIL # BLD AUTO: 0.14 K/UL — SIGNIFICANT CHANGE UP (ref 0–0.5)
EOSINOPHIL NFR BLD AUTO: 2.6 % — SIGNIFICANT CHANGE UP (ref 0–6)
GLUCOSE BLDC GLUCOMTR-MCNC: 106 MG/DL — HIGH (ref 70–99)
GLUCOSE BLDC GLUCOMTR-MCNC: 118 MG/DL — HIGH (ref 70–99)
GLUCOSE BLDC GLUCOMTR-MCNC: 137 MG/DL — HIGH (ref 70–99)
GLUCOSE BLDC GLUCOMTR-MCNC: 203 MG/DL — HIGH (ref 70–99)
GLUCOSE SERPL-MCNC: 198 MG/DL — HIGH (ref 70–99)
HCT VFR BLD CALC: 37.1 % — SIGNIFICANT CHANGE UP (ref 34.5–45)
HGB BLD-MCNC: 11.9 G/DL — SIGNIFICANT CHANGE UP (ref 11.5–15.5)
IMM GRANULOCYTES NFR BLD AUTO: 0.2 % — SIGNIFICANT CHANGE UP (ref 0–0.9)
LYMPHOCYTES # BLD AUTO: 2.85 K/UL — SIGNIFICANT CHANGE UP (ref 1–3.3)
LYMPHOCYTES # BLD AUTO: 52.7 % — HIGH (ref 13–44)
M PNEUMO IGM SER-ACNC: 1.04 INDEX — HIGH (ref 0–0.9)
MCHC RBC-ENTMCNC: 28.1 PG — SIGNIFICANT CHANGE UP (ref 27–34)
MCHC RBC-ENTMCNC: 32.1 GM/DL — SIGNIFICANT CHANGE UP (ref 32–36)
MCV RBC AUTO: 87.5 FL — SIGNIFICANT CHANGE UP (ref 80–100)
MONOCYTES # BLD AUTO: 0.39 K/UL — SIGNIFICANT CHANGE UP (ref 0–0.9)
MONOCYTES NFR BLD AUTO: 7.2 % — SIGNIFICANT CHANGE UP (ref 2–14)
MYCOPLASMA AG SPEC QL: ABNORMAL
NEUTROPHILS # BLD AUTO: 1.96 K/UL — SIGNIFICANT CHANGE UP (ref 1.8–7.4)
NEUTROPHILS NFR BLD AUTO: 36.2 % — LOW (ref 43–77)
NIGHT BLUE STAIN TISS: SIGNIFICANT CHANGE UP
NRBC # BLD: 0 /100 WBCS — SIGNIFICANT CHANGE UP (ref 0–0)
PLATELET # BLD AUTO: 410 K/UL — HIGH (ref 150–400)
POTASSIUM SERPL-MCNC: 3.9 MMOL/L — SIGNIFICANT CHANGE UP (ref 3.5–5.3)
POTASSIUM SERPL-SCNC: 3.9 MMOL/L — SIGNIFICANT CHANGE UP (ref 3.5–5.3)
RBC # BLD: 4.24 M/UL — SIGNIFICANT CHANGE UP (ref 3.8–5.2)
RBC # FLD: 12.6 % — SIGNIFICANT CHANGE UP (ref 10.3–14.5)
SODIUM SERPL-SCNC: 137 MMOL/L — SIGNIFICANT CHANGE UP (ref 135–145)
SPECIMEN SOURCE: SIGNIFICANT CHANGE UP
WBC # BLD: 5.41 K/UL — SIGNIFICANT CHANGE UP (ref 3.8–10.5)
WBC # FLD AUTO: 5.41 K/UL — SIGNIFICANT CHANGE UP (ref 3.8–10.5)

## 2023-10-04 RX ORDER — INSULIN LISPRO 100/ML
8 VIAL (ML) SUBCUTANEOUS
Refills: 0 | Status: DISCONTINUED | OUTPATIENT
Start: 2023-10-04 | End: 2023-10-05

## 2023-10-04 RX ADMIN — Medication 12 UNIT(S): at 08:49

## 2023-10-04 RX ADMIN — Medication 8 UNIT(S): at 17:32

## 2023-10-04 RX ADMIN — PIPERACILLIN AND TAZOBACTAM 25 GRAM(S): 4; .5 INJECTION, POWDER, LYOPHILIZED, FOR SOLUTION INTRAVENOUS at 08:50

## 2023-10-04 RX ADMIN — INSULIN GLARGINE 35 UNIT(S): 100 INJECTION, SOLUTION SUBCUTANEOUS at 22:23

## 2023-10-04 RX ADMIN — Medication 81 MILLIGRAM(S): at 12:18

## 2023-10-04 RX ADMIN — PIPERACILLIN AND TAZOBACTAM 25 GRAM(S): 4; .5 INJECTION, POWDER, LYOPHILIZED, FOR SOLUTION INTRAVENOUS at 16:41

## 2023-10-04 RX ADMIN — LOSARTAN POTASSIUM 50 MILLIGRAM(S): 100 TABLET, FILM COATED ORAL at 05:17

## 2023-10-04 RX ADMIN — PANTOPRAZOLE SODIUM 40 MILLIGRAM(S): 20 TABLET, DELAYED RELEASE ORAL at 05:17

## 2023-10-04 RX ADMIN — PIPERACILLIN AND TAZOBACTAM 25 GRAM(S): 4; .5 INJECTION, POWDER, LYOPHILIZED, FOR SOLUTION INTRAVENOUS at 00:59

## 2023-10-04 RX ADMIN — ATORVASTATIN CALCIUM 80 MILLIGRAM(S): 80 TABLET, FILM COATED ORAL at 22:23

## 2023-10-04 RX ADMIN — Medication 2: at 08:49

## 2023-10-04 RX ADMIN — Medication 8 UNIT(S): at 12:18

## 2023-10-04 NOTE — PROGRESS NOTE ADULT - ASSESSMENT
52 year old,  female, from home, with PMH of DM, HTN,  recent acute CVA on baby aspirin and Plavix.  Presented with hemoptysis since Monday.  Patient stated that symptom onset was during her hospital stay on September 26.  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  Patient reported having six episodes since being discharged home, mucus with streaks of blood.  Admitted for Hemoptysis r/o TB.

## 2023-10-04 NOTE — PROGRESS NOTE ADULT - PROBLEM/PLAN-4
A catheter (Catheter 6fr 145d Pgtl Crv 110cm 6 Sh Radopq Braid)  was used to cross the aortic valve and perform left ventriculography by power injection. 
DISPLAY PLAN FREE TEXT
Dr. West talking with Dr. Veras
Patient Transferred to: [id number]  Handoff Report Given to: Gabby TAVARES  
Patient is in the supine position.   The body was positioned using the following devices: safety strap and blanket.  The head was positioned using the following devices: regular pillow.  The left arm was positioned using the following devices: arm board and gel arm pads.  The right arm was positioned using the following devices: arm board and gel arm pads.  The patient was positioned by Juvenal Johnson RN 
Pre-procedure checklist reviewed, AUC complete and pre-sedation note complete.    MD aware of maximum contrast dose of 236.8 mL. 
Pre-procedure teaching reinforced.  
Right femoral artery angiogram was performed  using a Introducer Shth 6fr 50cm 11cm Grn Hub Snpft Dil by hand injection.  The access site was deemed to be appropriate for closure. 
Routine safety standards initiated.  Routine nursing standards initiated.  
The ECG revealed sinus bradycardia. The ECG rate was 50 bpm. 
The ECG revealed sinus bradycardia. The ECG rate was 52 bpm. 
The left coronary artery was selectively engaged and injected. Multiple views of the injected vessel were taken. 
The right coronary artery was selectively engaged and injected. 
The right coronary artery was selectively engaged and injected. 
The right coronary artery was selectively engaged and injected. Multiple views of the injected vessel were taken. 
The skin at the access site was anesthetized. Using a micropuncture needle with the Modified Seldinger technique the right femoral artery was succesfully accessed in a retrograde fashion over the guidewire, under fluoroscopic guidance using a Introducer Shth 6fr 50cm 11cm Grn Hub Snpft Dil. 
The skin of the bilateral groins was clipped, prepped and draped in the usual sterile manner. (If not otherwise specified, skin prep was bilateral.) 
DISPLAY PLAN FREE TEXT

## 2023-10-04 NOTE — DISCHARGE NOTE NURSING/CASE MANAGEMENT/SOCIAL WORK - PATIENT PORTAL LINK FT
You can access the FollowMyHealth Patient Portal offered by [facility] by registering at the following website: [email or web address]. By joining [employer]’s FollowMyHealth portal, you will also be able to view your health information using other applications (apps) compatible with our system.

## 2023-10-04 NOTE — PROGRESS NOTE ADULT - SUBJECTIVE AND OBJECTIVE BOX
Patient is seen and examined at the bed side, is afebrile. The AFB smears are negative X 3  are negative. The Hemoptysis  resolved.       REVIEW OF SYSTEMS: All other review systems are negative      ALLERGIES: NSAIDs (Short breath)  peppers (Rash)      Vital Signs Last 24 Hrs  T(C): 36.5 (04 Oct 2023 13:26), Max: 36.7 (04 Oct 2023 05:07)  T(F): 97.7 (04 Oct 2023 13:26), Max: 98 (04 Oct 2023 05:07)  HR: 87 (04 Oct 2023 13:) (80 - 87)  BP: 111/58 (04 Oct 2023 13:) (111/58 - 136/60)  BP(mean): 65 (04 Oct 2023 13:) (65 - 65)  RR: 18 (04 Oct 2023 13:26) (18 - 18)  SpO2: 97% (04 Oct 2023 13:) (95% - 97%)    Parameters below as of 04 Oct 2023 13:26  Patient On (Oxygen Delivery Method): room air        PHYSICAL EXAM:  GENERAL: Not in distress   CHEST/LUNG:  Not using accessory muscles   HEART: s1 and s2 present  ABDOMEN:  Nontender and  Nondistended  EXTREMITIES: No pedal  edema  CNS: Awake and Alert      LABS:                        11.9   5.41  )-----------( 410      ( 04 Oct 2023 07:11 )             37.1                           12.0   5.11  )-----------( 407      ( 03 Oct 2023 06:49 )             37.5       10-    137  |  103  |  18  ----------------------------<  198<H>  3.9   |  27  |  0.76    Ca    9.3      04 Oct 2023 07:11  Phos  3.5     10-03  Mg     1.6     10-03      10-03    137  |  102  |  18  ----------------------------<  225<H>  3.9   |  29  |  0.79    Ca    9.4      03 Oct 2023 06:49  Phos  3.5     10-03  Mg     1.6     10-03    TPro  7.4  /  Alb  2.7<L>  /  TBili  0.2  /  DBili  x   /  AST  16  /  ALT  42  /  AlkPhos  98  10-    PT/INR - ( 01 Oct 2023 01:04 )   PT: 11.4 sec;   INR: 1.00 ratio      PTT - ( 01 Oct 2023 01:04 )  PTT:34.9 sec        CAPILLARY BLOOD GLUCOSE  POCT Blood Glucose.: 169 mg/dL (01 Oct 2023 11:39)        Urinalysis Basic - ( 01 Oct 2023 03:04 )  Color: Yellow / Appearance: Clear / S.014 / pH: x  Gluc: x / Ketone: Negative mg/dL  / Bili: Negative / Urobili: 0.2 mg/dL   Blood: x / Protein: Negative mg/dL / Nitrite: Negative   Leuk Esterase: Small / RBC: 1 /HPF / WBC 2 /HPF   Sq Epi: x / Non Sq Epi: x / Bacteria: Few /HPF      Procalcitonin, Serum (10.02.23 @ 07:05): 0.05        MEDICATIONS  (STANDING):    aspirin enteric coated 81 milliGRAM(s) Oral daily  atorvastatin 80 milliGRAM(s) Oral at bedtime  influenza   Vaccine 0.5 milliLiter(s) IntraMuscular once  insulin glargine Injectable (LANTUS) 35 Unit(s) SubCutaneous at bedtime  insulin lispro (ADMELOG) corrective regimen sliding scale   SubCutaneous three times a day before meals  insulin lispro Injectable (ADMELOG) 8 Unit(s) SubCutaneous three times a day before meals  losartan 50 milliGRAM(s) Oral daily  pantoprazole    Tablet 40 milliGRAM(s) Oral before breakfast  piperacillin/tazobactam IVPB.. 3.375 Gram(s) IV Intermittent every 8 hours        RADIOLOGY & ADDITIONAL TESTS:    10/1/23 : CT Angio Chest PE Protocol w/ IV Cont (10.01.23 @ 03:01) IMPRESSION:  No pulmonary embolism.    Left lateral base opacity may represent atelectasis or pneumonia. Recommend follow-up in 6 weeks for resolution.  Dependent atelectatic changes.      23 : Xray Chest 1 View- PORTABLE-Urgent (23 @ 23:43) IMPRESSION: Persistent left lower lobe opacity suspicious for pneumonia.      MICROBIOLOGY DATA:    Culture - Sputum . (10.03.23 @ 15:50)   Gram Stain:   Rare polymorphonuclear leukocytes per low power field   Moderate Squamous epithelial cells per low power field   Moderate Gram Positive Cocci in Pairs and Chains per oil power field   Few Gram Negative Rods per oil power field  Specimen Source: .Sputum Sputum  Culture Results:   Normal Respiratory Esha present    Culture - Acid Fast - Sputum w/Smear . (10.03.23 @ 14:50)   Specimen Source: .Sputum Sputum  Acid Fast Bacilli Smear:   No acid-fast bacilli seen by fluorochrome stain  Culture - Acid Fast - Sputum w/Smear . (10.02.23 @ 22:50)   Specimen Source: .Sputum Sputum  Acid Fast Bacilli Smear:   No acid-fast bacilli seen by fluorochrome stain    Culture - Acid Fast - Sputum w/Smear . (10.02.23 @ 06:30)   Specimen Source: .Sputum Sputum  Acid Fast Bacilli Smear:   No acid-fast bacilli seen by fluorochrome stain      MRSA/MSSA PCR (10.02.23 @ 19:50)   MRSA PCR Result.: NotDetec  Legionella pneumophila Antigen, Urine (10.02.23 @ 22:20)   Legionella Antigen, Urine: Negative    COVID-19 PCR (10.01.23 @ 01:04)   COVID-19 PCR: NotDetec:

## 2023-10-04 NOTE — PROGRESS NOTE ADULT - PROBLEM SELECTOR PLAN 5
H/o HTN on Losartan   - BP stable   - C/w Losartan  - CV-Dr. Sheikh consulted, appreciated

## 2023-10-04 NOTE — PROGRESS NOTE ADULT - PROBLEM SELECTOR PLAN 4
lipid panel  statin
lipid panel  statin
- Noted last admission, seen on 9/25 MR Head w/ IV cont   - Pt to f/u out patient follow up with Vascular Neurologist-Dr. Pollack.

## 2023-10-04 NOTE — DISCHARGE NOTE PROVIDER - HOSPITAL COURSE
52 year old,  female, from home, with PMH of DM, HTN,  recent acute CVA on baby aspirin and Plavix.  Presented with hemoptysis since Monday.  Patient stated that symptom onset was during her hospital stay on September 26.  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  Patient reported having six episodes since being discharged home, mucus with streaks of blood.  Admitted for Hemoptysis r/o TB.       THIS IS A BRIEF SUMMARY.  FOR A FULL HOSPITAL COURSE SEE MEDICAL RECORDS OR [facility] PORTAL.    Patient is medically cleared by Attending for discharge.    a/o 10/4     52 year old,  female, from home, with PMH of DM, HTN,  recent acute CVA on baby aspirin and Plavix.  Presented with hemoptysis since Monday.  Patient stated that symptom onset was during her hospital stay on September 26.  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  Patient reported having six episodes since being discharged home, mucus with streaks of blood.  Admitted for Hemoptysis r/o TB.       THIS IS A BRIEF SUMMARY.  FOR A FULL HOSPITAL COURSE SEE MEDICAL RECORDS OR [facility] PORTAL.    Patient is medically cleared by Attending for discharge.    a/o 10/4     52 year old,  female, from home, with PMH of DM, HTN,  recent acute CVA on baby aspirin and Plavix.  Presented with hemoptysis since Monday.  Patient stated that symptom onset was during her hospital stay on September 26.  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  Patient reported having six episodes since being discharged home, mucus with streaks of blood.  Admitted for Hemoptysis r/o TB.   CXR from 9/30 shows : Persistent left lower lobe opacity suspicious for pneumonia.- MRSA PCR and Legionella urine AG are negative    CTA negative for PE  CT chest showing LLL consolidation   - AFB negative x3  - legionella, strep, MRSA, mycoplasma negative   - blood and urine cultures negative     Pt will continue augmentin 875 mg twice a day until 10/7/23     Pt is now medically optimized for discharge. 52 year old,  female, from home, with PMH of DM, HTN,  recent acute CVA on baby aspirin and Plavix.  Presented with hemoptysis since Monday.  Patient stated that symptom onset was during her hospital stay on September 26.  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  Patient reported having six episodes since being discharged home, mucus with streaks of blood.  Admitted for Hemoptysis r/o TB.   CXR from 9/30 shows : Persistent left lower lobe opacity suspicious for pneumonia.- MRSA PCR and Legionella urine AG are negative    CTA negative for PE  CT chest showing LLL consolidation   - AFB negative x3  - legionella, strep, MRSA, mycoplasma negative   - blood and urine cultures negative     Pt will continue augmentin 875 mg twice a day until 10/7/23     home medication changes for discharge:   losartan decreased to 50 mg daily  admelog changed to 6 units tid before meals    Pt is now medically optimized for discharge.     pt already has script for outpatient physical therapy from prior admission. 52 year old,  female, from home, with PMH of DM, HTN,  recent acute CVA on baby aspirin and Plavix.  Presented with hemoptysis since Monday.  Patient stated that symptom onset was during her hospital stay on September 26.  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  Patient reported having six episodes since being discharged home, mucus with streaks of blood.  Admitted for Hemoptysis r/o TB.   CXR from 9/30 shows : Persistent left lower lobe opacity suspicious for pneumonia.- MRSA PCR and Legionella urine AG are negative    CTA negative for PE  CT chest showing LLL consolidation likely pneumonia  - AFB negative x3  - legionella, strep, MRSA, mycoplasma negative   - blood and urine cultures negative     Pt will continue augmentin 875 mg twice a day until 10/7/23     home medication changes for discharge:   losartan decreased to 50 mg daily  admelog changed to 6 units tid before meals    Pt is now medically optimized for discharge.     pt already has script for outpatient physical therapy from prior admission. 52 year old,  female, from home, with PMH of DM, HTN,  recent CVA on baby aspirin and Plavix.  Presented with hemoptysis since Monday.  Patient stated that symptom onset was during her hospital stay on September 26.  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  Patient reported having six episodes since being discharged home, mucus with streaks of blood.  Admitted for Hemoptysis r/o TB.   CXR from 9/30 shows : Persistent left lower lobe opacity suspicious for pneumonia.- MRSA PCR and Legionella urine AG are negative    CTA negative for PE  CT chest showing LLL consolidation likely pneumonia  - AFB negative x3  - legionella, strep, MRSA, mycoplasma negative   - blood and urine cultures negative     Pt will continue augmentin 875 mg twice a day until 10/7/23     home medication changes for discharge:   losartan decreased to 50 mg daily  admelog changed to 6 units tid before meals    Pt is now medically optimized for discharge.     pt already has script for outpatient physical therapy from prior admission.

## 2023-10-04 NOTE — PROGRESS NOTE ADULT - PROBLEM SELECTOR PLAN 1
- P/w Hemoptysis since september 26 x 6 episodes.  Hgb=11.4  - Ruling out TB, less likely.  Most likely d/t Plavix, continue to hold Plavix.   - S/p CTA negative for PE  - AFB x3 pneding-f/u results.  AFB #1 & #2 negative   - Maintain isolation precaution  - ID-Dr. Mojica consulted, appreciated    - Pulm-Dr. Hughes consulted, appreciated - P/w Hemoptysis since september 26 x 6 episodes.  Hgb=11.4  - Ruling out TB, less likely.  Most likely d/t Plavix, continue to hold Plavix.   - S/p CTA negative for PE  - AFB negative x3  - S/p Isolation   - ID-Dr. Mojica consulted, appreciated    - Pulm-Dr. Hughes consulted, appreciated

## 2023-10-04 NOTE — DISCHARGE NOTE PROVIDER - CARE PROVIDER_API CALL
Pierre Pollack.  Vascular Neurology  [address] [place] [address]  Phone: [phone]  Fax: [phone]  Follow Up Time:    Pierre Pollack  Vascular Neurology  [address] [place] [address]  Phone: [phone]  Fax: [phone]  Follow Up Time: Routine    Natacha Dietrich  Internal Medicine  [address] [place] [address]  Phone: [phone]  Fax: [phone]  Follow Up Time: 1 week   Pierre Pollack  Vascular Neurology  [address] [place] [address]  Phone: [phone]  Fax: [phone]  Follow Up Time: Routine    Natacha Dietrich  Internal Medicine  [address] [place] [address]  Phone: [phone]  Fax: [phone]  Follow Up Time: 1 week    Ellen Sheikh  Cardiology  [address] [place] [address]  Phone: [phone]  Fax: [phone]  Follow Up Time: 1 week    Mindi Oscar  Endocrinology/Metab/Diabetes  [address] [place] [address]  Phone: [phone]  Fax: [phone]  Follow Up Time: Routine

## 2023-10-04 NOTE — DISCHARGE NOTE PROVIDER - PROVIDER TOKENS
PROVIDER:[TOKEN:[[id number]]] PROVIDER:[TOKEN:[[id number]],FOLLOWUP:[Routine]],PROVIDER:[TOKEN:[[id number]],FOLLOWUP:[1 week]] PROVIDER:[TOKEN:[[id number]],FOLLOWUP:[Routine]],PROVIDER:[TOKEN:[[id number]],FOLLOWUP:[1 week]],PROVIDER:[TOKEN:[[id number]],FOLLOWUP:[1 week]],PROVIDER:[TOKEN:[[id number]],FOLLOWUP:[Routine]]

## 2023-10-04 NOTE — PROGRESS NOTE ADULT - SUBJECTIVE AND OBJECTIVE BOX
Patient is a 52y old  Female who presents with a chief complaint of Hemoptysis (03 Oct 2023 14:33)    pt seen in icu [  ], reg med floor [  x ], bed [x  ], chair at bedside [   ], a+o x3 [x  ], lethargic [  ],    nad [ x ]      Allergies    NSAIDs (Short breath)  peppers (Rash)        Vitals    T(F): 98 (10-04-23 @ 05:07), Max: 98.8 (10-03-23 @ 16:08)  HR: 80 (10-04-23 @ 05:07) (80 - 91)  BP: 136/60 (10-04-23 @ 05:07) (99/88 - 136/60)  RR: 18 (10-04-23 @ 05:07) (18 - 18)  SpO2: 95% (10-04-23 @ 05:07) (95% - 96%)  Wt(kg): --  CAPILLARY BLOOD GLUCOSE      POCT Blood Glucose.: 137 mg/dL (03 Oct 2023 22:27)      Labs                          12.0   5.11  )-----------( 407      ( 03 Oct 2023 06:49 )             37.5       10-03    137  |  102  |  18  ----------------------------<  225<H>  3.9   |  29  |  0.79    Ca    9.4      03 Oct 2023 06:49  Phos  3.5     10-03  Mg     1.6     10-03              .Sputum Sputum  10-03 @ 15:50 --  --    Rare polymorphonuclear leukocytes per low power field  Moderate Squamous epithelial cells per low power field  Moderate Gram Positive Cocci in Pairs and Chains per oil power field  Few Gram Negative Rods per oil power field      .Sputum Sputum  10-02 @ 22:50 --  --  --      .Sputum Sputum  10-02 @ 06:30 --  --  --      Clean Catch Clean Catch (Midstream)  10-01 @ 03:05   <10,000 CFU/mL Normal Urogenital Esha  --  --      .Blood Blood-Peripheral  10-01 @ 00:40   No growth at 48 Hours  --  --      .Blood Blood-Peripheral  10-01 @ 00:30   No growth at 48 Hours  --  --          Radiology Results      Meds    MEDICATIONS  (STANDING):  aspirin enteric coated 81 milliGRAM(s) Oral daily  atorvastatin 80 milliGRAM(s) Oral at bedtime  influenza   Vaccine 0.5 milliLiter(s) IntraMuscular once  insulin glargine Injectable (LANTUS) 35 Unit(s) SubCutaneous at bedtime  insulin lispro (ADMELOG) corrective regimen sliding scale   SubCutaneous three times a day before meals  insulin lispro Injectable (ADMELOG) 12 Unit(s) SubCutaneous three times a day before meals  losartan 50 milliGRAM(s) Oral daily  pantoprazole    Tablet 40 milliGRAM(s) Oral before breakfast  piperacillin/tazobactam IVPB.. 3.375 Gram(s) IV Intermittent every 8 hours      MEDICATIONS  (PRN):      Physical Exam    Neuro :  no focal deficits  Respiratory: CTA B/L  CV: RRR, S1S2, no murmurs,   Abdominal: Soft, NT, ND +BS,  Extremities: No edema, + peripheral pulses      ASSESSMENT    pneumonia with hemoptysis,   pe r/o,    h/o DM,   HTN,    recent acute CVA on baby aspirin and Plavix  Hyperlipidemia      PLAN    hemoptysis resolved   f/u Procalcitonin level ,   id f/u   blood cx neg noted above   MRSA PCR neg noted above   Legionella urine AG neg noted above  AFB sputum smear x1 neg noted above   f/u afb cx culture   id f/u  Airborne Isolation   Continue Zosyn until work up is done  pulm f/u   incentive spirometer  atrov and prov nebs,   supplemental oxygen,   follow up CT chest in 6 weeks  hold plavix,    cardio f/u   cont asa, statin  lantus 35 qhs,   lispro 12 actid,   lispro ss,   cont current meds         Patient is a 52y old  Female who presents with a chief complaint of Hemoptysis (03 Oct 2023 14:33)    pt seen in icu [  ], reg med floor [  x ], bed [x  ], chair at bedside [   ], a+o x3 [x  ], lethargic [  ],    nad [ x ]      Allergies    NSAIDs (Short breath)  peppers (Rash)        Vitals    T(F): 98 (10-04-23 @ 05:07), Max: 98.8 (10-03-23 @ 16:08)  HR: 80 (10-04-23 @ 05:07) (80 - 91)  BP: 136/60 (10-04-23 @ 05:07) (99/88 - 136/60)  RR: 18 (10-04-23 @ 05:07) (18 - 18)  SpO2: 95% (10-04-23 @ 05:07) (95% - 96%)  Wt(kg): --  CAPILLARY BLOOD GLUCOSE      POCT Blood Glucose.: 137 mg/dL (03 Oct 2023 22:27)      Labs                          12.0   5.11  )-----------( 407      ( 03 Oct 2023 06:49 )             37.5       10-03    137  |  102  |  18  ----------------------------<  225<H>  3.9   |  29  |  0.79    Ca    9.4      03 Oct 2023 06:49  Phos  3.5     10-03  Mg     1.6     10-03      Procalcitonin, Serum (10.02.23 @ 07:05)   Procalcitonin, Serum: 0.05:        .Sputum Sputum  10-03 @ 15:50 --  --    Rare polymorphonuclear leukocytes per low power field  Moderate Squamous epithelial cells per low power field  Moderate Gram Positive Cocci in Pairs and Chains per oil power field  Few Gram Negative Rods per oil power field      .Sputum Sputum  10-02 @ 22:50 --  --  --  Acid Fast Bacilli Smear:   No acid-fast bacilli seen by fluorochrome stain    .Sputum Sputum  10-02 @ 06:30 --  --  --  Acid Fast Bacilli Smear:   No acid-fast bacilli seen by fluorochrome stain    Clean Catch Clean Catch (Midstream)  10-01 @ 03:05   <10,000 CFU/mL Normal Urogenital Esha  --  --      .Blood Blood-Peripheral  10-01 @ 00:40   No growth at 48 Hours  --  --      .Blood Blood-Peripheral  10-01 @ 00:30   No growth at 48 Hours  --  --          Radiology Results      Meds    MEDICATIONS  (STANDING):  aspirin enteric coated 81 milliGRAM(s) Oral daily  atorvastatin 80 milliGRAM(s) Oral at bedtime  influenza   Vaccine 0.5 milliLiter(s) IntraMuscular once  insulin glargine Injectable (LANTUS) 35 Unit(s) SubCutaneous at bedtime  insulin lispro (ADMELOG) corrective regimen sliding scale   SubCutaneous three times a day before meals  insulin lispro Injectable (ADMELOG) 12 Unit(s) SubCutaneous three times a day before meals  losartan 50 milliGRAM(s) Oral daily  pantoprazole    Tablet 40 milliGRAM(s) Oral before breakfast  piperacillin/tazobactam IVPB.. 3.375 Gram(s) IV Intermittent every 8 hours      MEDICATIONS  (PRN):      Physical Exam    Neuro :  no focal deficits  Respiratory: CTA B/L  CV: RRR, S1S2, no murmurs,   Abdominal: Soft, NT, ND +BS,  Extremities: No edema, + peripheral pulses      ASSESSMENT    pneumonia with hemoptysis,   pe r/o,    h/o DM,   HTN,    recent acute CVA on baby aspirin and Plavix  Hyperlipidemia      PLAN    hemoptysis resolved   Procalcitonin neg noted above  id f/u   blood cx neg noted above   MRSA PCR neg noted    Legionella urine AG neg noted   AFB sputum smear x 2 neg noted above   id f/u  Airborne Isolation   Continue Zosyn until work up is done  pulm f/u   incentive spirometer  atrov and prov nebs,   supplemental oxygen,   follow up CT chest in 6 weeks  hold plavix,    cardio f/u   cont asa, statin  lantus 35 qhs,   lispro 12 actid,   lispro ss,   cont current meds

## 2023-10-04 NOTE — PROGRESS NOTE ADULT - PROBLEM SELECTOR PLAN 6
H/o DM on Ozempic, Lantus and Admelog   - C/w Lantus, HSS and Premeal  - Continue to monitor FS   - Endo-Dr. Oscar consulted, appreciated
H/o DM on Ozempic, Lantus and Admelog   - C/w Lantus, HSS and Premeal  - Continue to monitor FS   - Endo-Dr. Oscar consult pending-f/u recommendations
H/o DM on Ozempic, Lantus and Admelog   - C/w Lantus, HSS and Premeal  - Continue to monitor FS   - Endo-Dr. Oscar consult pending-f/u recommendations

## 2023-10-04 NOTE — DISCHARGE NOTE NURSING/CASE MANAGEMENT/SOCIAL WORK - NSDCPEFALRISK_GEN_ALL_CORE
For information on Fall & Injury Prevention, visit: [email or web address] OR  [email or web address] OR  https://www.cdc.gov/steadi/patient.html

## 2023-10-04 NOTE — PROGRESS NOTE ADULT - PROBLEM SELECTOR PLAN 3
H/o CVA recent 9/2023.  Pt dc'd from [facility] on September 26, 2023: ASA, Plavix and Atorvastatin   - Continue holding Plavix in setting of hemoptysis  - C/w ASA & Atorvastatin
Accuchecks with reg insulin coverage  HBA1C
Accuchecks with reg insulin coverage  HBA1C
H/o CVA recent 9/2023.  Pt dc'd from [facility] on September 26, 2023: ASA, Plavix and Atorvastatin   - Continue holding Plavix in setting of hemoptysis  - C/w ASA & Atorvastatin
H/o CVA recent 9/2023.  Pt dc'd from [facility] on September 26, 2023: ASA, Plavix and Atorvastatin   - Continue holding Plavix in setting of hemoptysis  - C/w ASA & Atorvastatin

## 2023-10-04 NOTE — DISCHARGE NOTE PROVIDER - NSDCMRMEDTOKEN_GEN_ALL_CORE_FT
Admelog SoloStar 100 units/mL injectable solution: 12 unit(s) injectable 3 times a day (with meals) use 14 units if glucose &gt;150, use 16 units if glucose &gt; 250, use 18 units if glucose &gt;350  alcohol swabs: Apply topically to affected area 4 times a day  Aspirin Enteric Coated 81 mg oral delayed release tablet: 1 tab(s) orally once a day  atorvastatin 80 mg oral tablet: 1 tab(s) orally once a day (at bedtime)  clopidogrel 75 mg oral tablet: 1 tab(s) orally once a day  Freestyle Precision Leon Strips: Test blood sugar four times a day when you see check blood glucose symbol or when symptoms don&#x27;t match Dimitri reading.  insulin glargine 100 units/mL subcutaneous solution: 35 unit(s) subcutaneous once a day  Insulin Pen Needles, 4mm: 1 application subcutaneously 4 times a day. ** Use with insulin pen **  lancets: 1 application subcutaneously 4 times a day  losartan 50 mg oral tablet: 1 tab(s) orally 2 times a day  omeprazole 40 mg oral delayed release capsule: 1 tab(s) orally once a day  pioglitazone 30 mg oral tablet: 1 tab(s) orally once a day  semaglutide 0.5 mg/0.5 mL (0.5 mg dose) subcutaneous solution: 0.5 milligram(s) subcutaneously once a week   Admelog SoloStar 100 units/mL injectable solution: 6 unit(s) injectable 3 times a day (with meals)  amoxicillin-clavulanate 875 mg-125 mg oral tablet: 1 tab(s) orally 2 times a day until 10/7/23  Aspirin Enteric Coated 81 mg oral delayed release tablet: 1 tab(s) orally once a day  atorvastatin 80 mg oral tablet: 1 tab(s) orally once a day (at bedtime)  insulin glargine 100 units/mL subcutaneous solution: 35 unit(s) subcutaneous once a day  losartan 50 mg oral tablet: 1 tab(s) orally once a day  omeprazole 40 mg oral delayed release capsule: 1 tab(s) orally once a day  pioglitazone 30 mg oral tablet: 1 tab(s) orally once a day  semaglutide 0.5 mg/0.5 mL (0.5 mg dose) subcutaneous solution: 0.5 milligram(s) subcutaneously once a week

## 2023-10-04 NOTE — PROGRESS NOTE ADULT - PROBLEM SELECTOR PROBLEM 3
DM (diabetes mellitus)
CVA (cerebrovascular accident)
DM (diabetes mellitus)
CVA (cerebrovascular accident)
CVA (cerebrovascular accident)

## 2023-10-04 NOTE — PROGRESS NOTE ADULT - PROBLEM SELECTOR PLAN 7
H/o HLD on Atorvastatin   - C/w Atorvastatin

## 2023-10-04 NOTE — PROGRESS NOTE ADULT - SUBJECTIVE AND OBJECTIVE BOX
Date of Service 10-04-23 @ 10:06    CHIEF COMPLAINT:Patient is a 52y old  Female who presents with a chief complaint of Hemoptysis.Pt reports no further hemoptysis.    	  REVIEW OF SYSTEMS:  CONSTITUTIONAL: No fever, weight loss, or fatigue  EYES: No eye pain, visual disturbances, or discharge  ENT:  No difficulty hearing, tinnitus, vertigo; No sinus or throat pain  NECK: No pain or stiffness  RESPIRATORY: No cough, wheezing, chills or hemoptysis; No Shortness of Breath  CARDIOVASCULAR: No chest pain, palpitations, passing out, dizziness, or leg swelling  GASTROINTESTINAL: No abdominal or epigastric pain. No nausea, vomiting, or hematemesis; No diarrhea or constipation. No melena or hematochezia.  GENITOURINARY: No dysuria, frequency, hematuria, or incontinence  NEUROLOGICAL: No headaches, memory loss, loss of strength, numbness, or tremors  SKIN: No itching, burning, rashes, or lesions   LYMPH Nodes: No enlarged glands  ENDOCRINE: No heat or cold intolerance; No hair loss  MUSCULOSKELETAL: No joint pain or swelling; No muscle, back, or extremity pain  PSYCHIATRIC: No depression, anxiety, mood swings, or difficulty sleeping  HEME/LYMPH: No easy bruising, or bleeding gums  ALLERGY AND IMMUNOLOGIC: No hives or eczema	        PHYSICAL EXAM:  T(C): 36.7 (10-04-23 @ 05:07), Max: 37.1 (10-03-23 @ 16:08)  HR: 80 (10-04-23 @ 05:07) (80 - 91)  BP: 136/60 (10-04-23 @ 05:07) (99/88 - 136/60)  RR: 18 (10-04-23 @ 05:07) (18 - 18)  SpO2: 95% (10-04-23 @ 05:07) (95% - 96%)  Wt(kg): --  I&O's Summary      Appearance: Normal	  HEENT:   Normal oral mucosa, PERRL, EOMI	  Lymphatic: No lymphadenopathy  Cardiovascular: Normal S1 S2, No JVD, No murmurs, No edema  Respiratory: Lungs clear to auscultation	  Psychiatry: A & O x 3, Mood & affect appropriate  Gastrointestinal:  Soft, Non-tender, + BS	  Skin: No rashes, No ecchymoses, No cyanosis	  Neurologic: Non-focal  Extremities: Normal range of motion, No clubbing, cyanosis or edema  Vascular: Peripheral pulses palpable 2+ bilaterally    MEDICATIONS  (STANDING):  aspirin enteric coated 81 milliGRAM(s) Oral daily  atorvastatin 80 milliGRAM(s) Oral at bedtime  influenza   Vaccine 0.5 milliLiter(s) IntraMuscular once  insulin glargine Injectable (LANTUS) 35 Unit(s) SubCutaneous at bedtime  insulin lispro (ADMELOG) corrective regimen sliding scale   SubCutaneous three times a day before meals  insulin lispro Injectable (ADMELOG) 12 Unit(s) SubCutaneous three times a day before meals  losartan 50 milliGRAM(s) Oral daily  pantoprazole    Tablet 40 milliGRAM(s) Oral before breakfast  piperacillin/tazobactam IVPB.. 3.375 Gram(s) IV Intermittent every 8 hours        LABS:	 	                       11.9   5.41  )-----------( 410      ( 04 Oct 2023 07:11 )             37.1     10-04    137  |  103  |  18  ----------------------------<  198<H>  3.9   |  27  |  0.76    Ca    9.3      04 Oct 2023 07:11  Phos  3.5     10-03  Mg     1.6     10-03        Lipid Profile: Cholesterol 214  LDL --  HDL 36    Ldl calc 153  Ratio --      TSH: Thyroid Stimulating Hormone, Serum: 0.59 uU/mL (09-21 @ 07:02)      	      AFBx2-.

## 2023-10-04 NOTE — PROGRESS NOTE ADULT - SUBJECTIVE AND OBJECTIVE BOX
Interval Events:  pt in nad    Allergies    NSAIDs (Short breath)  peppers (Rash)    Intolerances      Endocrine/Metabolic Medications:  atorvastatin 80 milliGRAM(s) Oral at bedtime  insulin glargine Injectable (LANTUS) 35 Unit(s) SubCutaneous at bedtime  insulin lispro (ADMELOG) corrective regimen sliding scale   SubCutaneous three times a day before meals  insulin lispro Injectable (ADMELOG) 12 Unit(s) SubCutaneous three times a day before meals      Vital Signs Last 24 Hrs  T(C): 36.7 (04 Oct 2023 05:07), Max: 37.1 (03 Oct 2023 16:08)  T(F): 98 (04 Oct 2023 05:07), Max: 98.8 (03 Oct 2023 16:08)  HR: 80 (04 Oct 2023 05:07) (80 - 91)  BP: 136/60 (04 Oct 2023 05:07) (99/88 - 136/60)  BP(mean): --  RR: 18 (04 Oct 2023 05:07) (18 - 18)  SpO2: 95% (04 Oct 2023 05:07) (95% - 96%)    Parameters below as of 04 Oct 2023 05:07  Patient On (Oxygen Delivery Method): room air          PHYSICAL EXAM  All physical exam findings normal, except those marked:  General:	Alert, active, cooperative, NAD, well hydrated  .		[] Abnormal:  Neck		Normal: supple, no cervical adenopathy, no palpable thyroid  .		[] Abnormal:  Cardiovascular	Normal: regular rate, normal S1, S2, no murmurs  .		[] Abnormal:  Respiratory	Normal: no chest wall deformity, normal respiratory pattern, CTA B/L  .		[] Abnormal:  Abdominal	Normal: soft, ND, NT, bowel sounds present, no masses, no organomegaly  .		[] Abnormal:  		Normal normal genitalia, testes descended, circumcised/uncircumcised  .		Garrett stage:			Breast garrett:  .		Menstrual history:  .		[] Abnormal:  Extremities	Normal: FROM x4  .		[] Abnormal:  Skin		Normal: intact and not indurated, no rash, no acanthosis nigricans  .		[] Abnormal:  Neurologic	Normal: grossly intact  .		[] Abnormal:    LABS                        11.9   5.41  )-----------( 410      ( 04 Oct 2023 07:11 )             37.1                               137    |  103    |  18                  Calcium: 9.3   / iCa: x      (10-04 @ 07:11)    ----------------------------<  198       Magnesium: x                                3.9     |  27     |  0.76             Phosphorous: x          CAPILLARY BLOOD GLUCOSE      POCT Blood Glucose.: 203 mg/dL (04 Oct 2023 08:27)  POCT Blood Glucose.: 137 mg/dL (03 Oct 2023 22:27)  POCT Blood Glucose.: 84 mg/dL (03 Oct 2023 16:06)  POCT Blood Glucose.: 236 mg/dL (03 Oct 2023 11:34)        Assesment/plan    · Assessment	  This is a 51 y/o  female with pmhx  of DM, HTN,  recent acute CVA on baby aspirin and Plavix presenting with hemoptysis since Monday.   Found to have uncont dm. Pt is on basal/bolus insulin as out pt.Admits to taking lantus 35 qhs and admelog 12-18 premeals with ? fsg low to mid 100ss. Denies hypoglycemia.         Problem/Recommendation - 1:  ·  Problem: DM (diabetes mellitus).   ·  Recommendation: uncont with hyperglycemia  check a1c level  agree with lantus 35 units  dec admelog to 8 ac tid- hold if npo  fsg ac and hs  nutrition eval  d/w prim team.     Problem/Recommendation - 2:  ·  Problem: Pneumonia.   ·  Recommendation: with hemoptysis  w/u per ID and pulm  isolated per ID.

## 2023-10-04 NOTE — PROGRESS NOTE ADULT - SUBJECTIVE AND OBJECTIVE BOX
NP Note discussed with  primary attending    Patient is a 52y old  Female who presents with a chief complaint of Hemoptysis (04 Oct 2023 10:11)      INTERVAL HPI/OVERNIGHT EVENTS: Pt denies coughing or spitting up blood.  No new complaints or concerns.      MEDICATIONS  (STANDING):  aspirin enteric coated 81 milliGRAM(s) Oral daily  atorvastatin 80 milliGRAM(s) Oral at bedtime  influenza   Vaccine 0.5 milliLiter(s) IntraMuscular once  insulin glargine Injectable (LANTUS) 35 Unit(s) SubCutaneous at bedtime  insulin lispro (ADMELOG) corrective regimen sliding scale   SubCutaneous three times a day before meals  insulin lispro Injectable (ADMELOG) 8 Unit(s) SubCutaneous three times a day before meals  losartan 50 milliGRAM(s) Oral daily  pantoprazole    Tablet 40 milliGRAM(s) Oral before breakfast  piperacillin/tazobactam IVPB.. 3.375 Gram(s) IV Intermittent every 8 hours    MEDICATIONS  (PRN):      __________________________________________________  REVIEW OF SYSTEMS:    CONSTITUTIONAL: No fever  EYES: No acute visual disturbances  NECK: No pain or stiffness  RESPIRATORY: No cough; No shortness of breath  CARDIOVASCULAR: No chest pain, no palpitations  GASTROINTESTINAL: No pain. No nausea or vomiting.  No diarrhea   NEUROLOGICAL: No headache or numbness, no tremors  MUSCULOSKELETAL: No joint pain, no muscle pain  GENITOURINARY: No dysuria, no frequency, no hesitancy  PSYCHIATRY: No depression , no anxiety  ALL OTHER  ROS negative        Vital Signs Last 24 Hrs  T(C): 36.7 (04 Oct 2023 05:07), Max: 37.1 (03 Oct 2023 16:08)  T(F): 98 (04 Oct 2023 05:07), Max: 98.8 (03 Oct 2023 16:08)  HR: 80 (04 Oct 2023 05:07) (80 - 91)  BP: 136/60 (04 Oct 2023 05:07) (99/88 - 136/60)  RR: 18 (04 Oct 2023 05:07) (18 - 18)  SpO2: 95% (04 Oct 2023 05:07) (95% - 96%)    Parameters below as of 04 Oct 2023 05:07  Patient On (Oxygen Delivery Method): room air        ________________________________________________  PHYSICAL EXAM:  GENERAL: NAD  HEENT: Normocephalic;  conjunctivae and sclerae clear; moist mucous membranes   NECK : Supple  CHEST/LUNG: Clear to auscultation bilaterally with good air entry   HEART: S1 S2  regular; no murmurs, gallops or rubs  ABDOMEN: Soft, Nontender, Nondistended; Bowel sounds present x 4 quad  EXTREMITIES: No cyanosis; no edema; no calf tenderness  SKIN: Warm and dry; no rash  NERVOUS SYSTEM:  Awake and alert; Oriented to place, person and time; no new deficits    _________________________________________________  LABS:                        11.9   5.41  )-----------( 410      ( 04 Oct 2023 07:11 )             37.1     10-04    137  |  103  |  18  ----------------------------<  198<H>  3.9   |  27  |  0.76    Ca    9.3      04 Oct 2023 07:11  Phos  3.5     10-03  Mg     1.6     10-03        Urinalysis Basic - ( 04 Oct 2023 07:11 )    Color: x / Appearance: x / SG: x / pH: x  Gluc: 198 mg/dL / Ketone: x  / Bili: x / Urobili: x   Blood: x / Protein: x / Nitrite: x   Leuk Esterase: x / RBC: x / WBC x   Sq Epi: x / Non Sq Epi: x / Bacteria: x      CAPILLARY BLOOD GLUCOSE      POCT Blood Glucose.: 203 mg/dL (04 Oct 2023 08:27)  POCT Blood Glucose.: 137 mg/dL (03 Oct 2023 22:27)  POCT Blood Glucose.: 84 mg/dL (03 Oct 2023 16:06)        RADIOLOGY & ADDITIONAL TESTS:    Imaging Personally Reviewed:  YES    Consultant(s) Notes Reviewed:   YES    Care Discussed with Consultants :     Plan of care was discussed with patient and /or primary care giver; all questions and concerns were addressed and care was aligned with patient's wishes.

## 2023-10-04 NOTE — PROGRESS NOTE ADULT - PROBLEM SELECTOR PLAN 2
- S/p CT chest showing LLL consolidation   - S/p Azithromycin & Rocephin.  - Currently on Zosyn  - Bld & Ucx's NGTD   - Mycoplasma p. pending-f/u results   - Sputum cx, MRSA, Legionella and Strep negative   - ID-Dr. Mojica consulted, appreciated - S/p CT chest showing LLL consolidation   - S/p Azithromycin & Rocephin.  - Currently on Zosyn.  Awaiting DC abx plan-f/u ID.    - Bld & Ucx's NGTD   - Mycoplasma p. pending-f/u results   - Sputum cx, MRSA, Legionella and Strep negative   - ID-Dr. Mojica consulted, appreciated

## 2023-10-04 NOTE — DISCHARGE NOTE PROVIDER - CARE PROVIDERS DIRECT ADDRESSES
[email or web address],DirectAddress_Unknown,DirectAddress_Unknown,DirectAddress_Unknown

## 2023-10-04 NOTE — PROGRESS NOTE ADULT - PROBLEM SELECTOR PLAN 8
- C/w SCD in setting of hemoptysis   - C/w Protonix for GI ppx

## 2023-10-04 NOTE — DISCHARGE NOTE PROVIDER - NSDCCPCAREPLAN_GEN_ALL_CORE_FT
PRINCIPAL DISCHARGE DIAGNOSIS  Diagnosis: Pneumonia  Assessment and Plan of Treatment: On hospital admission your CT chest showed LLL consolidation.  You were seen by an Infectious Disease doctor and treated with antibiotics.  You will continue.......on discharge.....        SECONDARY DISCHARGE DIAGNOSES  Diagnosis: Hemoptysis  Assessment and Plan of Treatment: You presented to the hospital with hemoptysis since september 26 x 6 episodes.  Your hemoglobin remained stable.  Your CTA was negative for a pulmonary embolism.  You were seen by an Infectious Disease doctor and Pulmonologist.  Tb was ruled out by testing your sputum.        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  You have been instructed to follow up with Vascular Neurologist-Dr. Pollack upon discharge from the hospital       Diagnosis: CVA (cerebrovascular accident)  Assessment and Plan of Treatment: You recently had a CVA and discharged from [facility] on September 26, 2023.  You were prescribed ASA, Plavix and Atorvastatin.  However your Plavix was stopped in setting of your hemoptysis.        Diagnosis: DM (diabetes mellitus)  Assessment and Plan of Treatment: You have a history of Diabetes for which you take Ozempic, Lantus and Admelog.   Your A1C=15.5 and is uncontrolled.  While in the hospital you were seen by an Endocrinologist with a recommendation to continue........on discharge.......      Diagnosis: HLD (hyperlipidemia)  Assessment and Plan of Treatment: You have a history of HLD for which you take Atorvastatin.  Please continue taking your medication as prescribed.    Diagnosis: HTN (hypertension)  Assessment and Plan of Treatment: You have a history of HTN for which you take Losartan.  Please continue taking your medication as prescribed.          PRINCIPAL DISCHARGE DIAGNOSIS  Diagnosis: Community acquired pneumonia  Assessment and Plan of Treatment: You came into the hospital with bloody sputum   you were tested for tuberculosis which was negative all 3 times.   you had a CT angiogram of your chest that showed left lower lung pneumonia and did not show any blood clots in your lungs  you are to continue taking augmentin 875 mg twice a day until 10/7/23  you will need a repeat CT scan of your chest that can be done outpatient in 6 weeks (Mid-November) to check for resolution of your pneumonia.   Please follow up with your primary care doctor Dr Dietrich to help you get a referral for a place to get a CT chest at outpatient radiology      SECONDARY DISCHARGE DIAGNOSES  Diagnosis: CVA (cerebrovascular accident)  Assessment and Plan of Treatment: You recently had a CVA and discharged from [facility] on September 26, 2023.  You were prescribed ASA, Plavix and Atorvastatin.  However your Plavix was stopped in setting of your recent bloody sputum.   continue taking a aspirin 81 mg daily only  continue taking your blood pressure pill and cholesterol medications to prevent future strokes  continue your outpatient physical therapy.    Diagnosis: DM (diabetes mellitus)  Assessment and Plan of Treatment: You have a history of Diabetes for which you take Ozempic, Lantus and Admelog.   Your A1C=15.5 and is uncontrolled.  While in the hospital you were seen by an Endocrinologist with a recommendation to continue 35 units of lantus at bedtime and admelog 6 units with meals three times a day.   please follow up with Diabetes Specialist Dr. Oscar in 1-2 weeks.       Diagnosis: HTN (hypertension)  Assessment and Plan of Treatment: your blood pressure ranged between:( 111/58 - 128/63)  your losartan is lowered to 50 mg daily  follow up with your primary care doctor or cardiologist.  try to take your blood pressure readings twice a day, morning and night time.   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  You have been instructed to follow up with Vascular Neurologist-Dr. Pollack, please make an appointment      Diagnosis: HLD (hyperlipidemia)  Assessment and Plan of Treatment: continue taking atorvastatin 80 mg daily     PRINCIPAL DISCHARGE DIAGNOSIS  Diagnosis: Community acquired pneumonia  Assessment and Plan of Treatment: You came into the hospital with bloody sputum   you were tested for tuberculosis which was negative all 3 times.   you had a CT angiogram of your chest that showed left lower lung pneumonia and did not show any blood clots in your lungs  you are to continue taking augmentin 875 mg twice a day until 10/7/23  you will need a repeat CT scan of your chest that can be done outpatient in 6 weeks (Mid-November) to check for resolution of your pneumonia.   Please follow up with your primary care doctor Dr Dietrich to help you get a referral for a place to get a CT chest at outpatient radiology      SECONDARY DISCHARGE DIAGNOSES  Diagnosis: CVA (cerebrovascular accident)  Assessment and Plan of Treatment: You recently had a CVA and discharged from [facility] on September 26, 2023.  You were prescribed ASA, Plavix and Atorvastatin.  However your Plavix was stopped in setting of your recent bloody sputum.   continue taking a aspirin 81 mg daily only  continue taking your blood pressure pill and cholesterol medications to prevent future strokes  continue your outpatient physical therapy.    Diagnosis: DM (diabetes mellitus)  Assessment and Plan of Treatment: You have a history of Diabetes for which you take Ozempic, Lantus and Admelog.   Your A1C=15.5 and is uncontrolled.  While in the hospital you were seen by an Endocrinologist with a recommendation to continue 35 units of lantus at bedtime and admelog 6 units with meals three times a day.   please follow up with Diabetes Specialist Dr. Oscar in 1-2 weeks.       Diagnosis: HTN (hypertension)  Assessment and Plan of Treatment: your blood pressure ranged between:( 111/58 - 128/63)  your losartan is lowered to 50 mg daily  follow up with your primary care doctor or cardiologist Dr. Sheikh in 1 week  try to take your blood pressure readings twice a day, morning and night time.   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  You have been instructed to follow up with Vascular Neurologist-Dr. Pollack, please make an appointment      Diagnosis: HLD (hyperlipidemia)  Assessment and Plan of Treatment: continue taking atorvastatin 80 mg daily

## 2023-10-04 NOTE — PROGRESS NOTE ADULT - ASSESSMENT
Patient is a 52y old  Female with pmhx  of DM, HTN,  recent acute CVA on baby aspirin and Plavix, now presents to the ER for evaluation of hemoptysis since Monday.  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  Reports having six episodes since being discharged home, mucus with streaks of blood. Endorses chest tightness and dizziness when standing.  Denies any shortness of breath, fevers, chills or epistaxis.  Denies any history of bronchiectasis, TB or embolism. On admission, she found to have no fever, no Leukocytosis and Persistent left lower lobe opacity suspicious for pneumonia on CXR. She has started on Zosyn and the ID consult requested to assist with further evaluation and antibiotic  management.     # Pneumonia - CXR from 9/30 shows : Persistent left lower lobe opacity suspicious for pneumonia.- MRSA PCR and Legionella urine AG are negative  # Hemoptysis- R/O TB - Three AFB smears are negative as of 10/2/23 and 10/3/23    would recommend:    1. PT/OOB to  chair   2. May change Zosyn to oral Augmentin 875mg q 12hours to continue until 10/7/23  3. Agree with holding AC  4. Discontinue Airborne Isolation with Conjunction of Infection Control Dept. in the setting of  Three negative AFB smears    d/w Covering Terence NAIR and patient    Attending Attestation:     Spent more than 35 minutes on total encounter, more than 50 % of the visit was spent counseling and/or coordinating care by the Attending physician.

## 2023-10-05 VITALS
HEART RATE: 82 BPM | TEMPERATURE: 99 F | DIASTOLIC BLOOD PRESSURE: 55 MMHG | RESPIRATION RATE: 18 BRPM | OXYGEN SATURATION: 97 % | SYSTOLIC BLOOD PRESSURE: 111 MMHG

## 2023-10-05 LAB
ANION GAP SERPL CALC-SCNC: 7 MMOL/L — SIGNIFICANT CHANGE UP (ref 5–17)
BASOPHILS # BLD AUTO: 0.05 K/UL — SIGNIFICANT CHANGE UP (ref 0–0.2)
BASOPHILS NFR BLD AUTO: 1 % — SIGNIFICANT CHANGE UP (ref 0–2)
BUN SERPL-MCNC: 19 MG/DL — HIGH (ref 7–18)
CALCIUM SERPL-MCNC: 9 MG/DL — SIGNIFICANT CHANGE UP (ref 8.4–10.5)
CHLORIDE SERPL-SCNC: 106 MMOL/L — SIGNIFICANT CHANGE UP (ref 96–108)
CO2 SERPL-SCNC: 27 MMOL/L — SIGNIFICANT CHANGE UP (ref 22–31)
CREAT SERPL-MCNC: 0.7 MG/DL — SIGNIFICANT CHANGE UP (ref 0.5–1.3)
CULTURE RESULTS: SIGNIFICANT CHANGE UP
EGFR: 104 ML/MIN/1.73M2 — SIGNIFICANT CHANGE UP
EOSINOPHIL # BLD AUTO: 0.14 K/UL — SIGNIFICANT CHANGE UP (ref 0–0.5)
EOSINOPHIL NFR BLD AUTO: 2.8 % — SIGNIFICANT CHANGE UP (ref 0–6)
GLUCOSE BLDC GLUCOMTR-MCNC: 104 MG/DL — HIGH (ref 70–99)
GLUCOSE BLDC GLUCOMTR-MCNC: 137 MG/DL — HIGH (ref 70–99)
GLUCOSE SERPL-MCNC: 151 MG/DL — HIGH (ref 70–99)
HCT VFR BLD CALC: 35.3 % — SIGNIFICANT CHANGE UP (ref 34.5–45)
HGB BLD-MCNC: 11.5 G/DL — SIGNIFICANT CHANGE UP (ref 11.5–15.5)
IMM GRANULOCYTES NFR BLD AUTO: 0.2 % — SIGNIFICANT CHANGE UP (ref 0–0.9)
LYMPHOCYTES # BLD AUTO: 2.62 K/UL — SIGNIFICANT CHANGE UP (ref 1–3.3)
LYMPHOCYTES # BLD AUTO: 52.8 % — HIGH (ref 13–44)
MCHC RBC-ENTMCNC: 28 PG — SIGNIFICANT CHANGE UP (ref 27–34)
MCHC RBC-ENTMCNC: 32.6 GM/DL — SIGNIFICANT CHANGE UP (ref 32–36)
MCV RBC AUTO: 86.1 FL — SIGNIFICANT CHANGE UP (ref 80–100)
MONOCYTES # BLD AUTO: 0.4 K/UL — SIGNIFICANT CHANGE UP (ref 0–0.9)
MONOCYTES NFR BLD AUTO: 8.1 % — SIGNIFICANT CHANGE UP (ref 2–14)
NEUTROPHILS # BLD AUTO: 1.74 K/UL — LOW (ref 1.8–7.4)
NEUTROPHILS NFR BLD AUTO: 35.1 % — LOW (ref 43–77)
NRBC # BLD: 0 /100 WBCS — SIGNIFICANT CHANGE UP (ref 0–0)
PLATELET # BLD AUTO: 366 K/UL — SIGNIFICANT CHANGE UP (ref 150–400)
POTASSIUM SERPL-MCNC: 4.1 MMOL/L — SIGNIFICANT CHANGE UP (ref 3.5–5.3)
POTASSIUM SERPL-SCNC: 4.1 MMOL/L — SIGNIFICANT CHANGE UP (ref 3.5–5.3)
RBC # BLD: 4.1 M/UL — SIGNIFICANT CHANGE UP (ref 3.8–5.2)
RBC # FLD: 12.5 % — SIGNIFICANT CHANGE UP (ref 10.3–14.5)
SODIUM SERPL-SCNC: 140 MMOL/L — SIGNIFICANT CHANGE UP (ref 135–145)
SPECIMEN SOURCE: SIGNIFICANT CHANGE UP
WBC # BLD: 4.96 K/UL — SIGNIFICANT CHANGE UP (ref 3.8–10.5)
WBC # FLD AUTO: 4.96 K/UL — SIGNIFICANT CHANGE UP (ref 3.8–10.5)

## 2023-10-05 PROCEDURE — 83605 ASSAY OF LACTIC ACID: CPT

## 2023-10-05 PROCEDURE — 87641 MR-STAPH DNA AMP PROBE: CPT

## 2023-10-05 PROCEDURE — 81001 URINALYSIS AUTO W/SCOPE: CPT

## 2023-10-05 PROCEDURE — 87449 NOS EACH ORGANISM AG IA: CPT

## 2023-10-05 PROCEDURE — 80053 COMPREHEN METABOLIC PANEL: CPT

## 2023-10-05 PROCEDURE — 85730 THROMBOPLASTIN TIME PARTIAL: CPT

## 2023-10-05 PROCEDURE — 96374 THER/PROPH/DIAG INJ IV PUSH: CPT

## 2023-10-05 PROCEDURE — 87015 SPECIMEN INFECT AGNT CONCNTJ: CPT

## 2023-10-05 PROCEDURE — 96375 TX/PRO/DX INJ NEW DRUG ADDON: CPT

## 2023-10-05 PROCEDURE — 87640 STAPH A DNA AMP PROBE: CPT

## 2023-10-05 PROCEDURE — 85025 COMPLETE CBC W/AUTO DIFF WBC: CPT

## 2023-10-05 PROCEDURE — 86738 MYCOPLASMA ANTIBODY: CPT

## 2023-10-05 PROCEDURE — 87086 URINE CULTURE/COLONY COUNT: CPT

## 2023-10-05 PROCEDURE — 82962 GLUCOSE BLOOD TEST: CPT

## 2023-10-05 PROCEDURE — 93005 ELECTROCARDIOGRAM TRACING: CPT

## 2023-10-05 PROCEDURE — 71045 X-RAY EXAM CHEST 1 VIEW: CPT

## 2023-10-05 PROCEDURE — 86901 BLOOD TYPING SEROLOGIC RH(D): CPT

## 2023-10-05 PROCEDURE — 85610 PROTHROMBIN TIME: CPT

## 2023-10-05 PROCEDURE — 87116 MYCOBACTERIA CULTURE: CPT

## 2023-10-05 PROCEDURE — 87070 CULTURE OTHR SPECIMN AEROBIC: CPT

## 2023-10-05 PROCEDURE — 86850 RBC ANTIBODY SCREEN: CPT

## 2023-10-05 PROCEDURE — 83735 ASSAY OF MAGNESIUM: CPT

## 2023-10-05 PROCEDURE — 87899 AGENT NOS ASSAY W/OPTIC: CPT

## 2023-10-05 PROCEDURE — 87040 BLOOD CULTURE FOR BACTERIA: CPT

## 2023-10-05 PROCEDURE — 36415 COLL VENOUS BLD VENIPUNCTURE: CPT

## 2023-10-05 PROCEDURE — 86900 BLOOD TYPING SEROLOGIC ABO: CPT

## 2023-10-05 PROCEDURE — 71275 CT ANGIOGRAPHY CHEST: CPT | Mod: MA

## 2023-10-05 PROCEDURE — 84145 PROCALCITONIN (PCT): CPT

## 2023-10-05 PROCEDURE — 87635 SARS-COV-2 COVID-19 AMP PRB: CPT

## 2023-10-05 PROCEDURE — 84702 CHORIONIC GONADOTROPIN TEST: CPT

## 2023-10-05 PROCEDURE — 80048 BASIC METABOLIC PNL TOTAL CA: CPT

## 2023-10-05 PROCEDURE — 99285 EMERGENCY DEPT VISIT HI MDM: CPT

## 2023-10-05 PROCEDURE — 84100 ASSAY OF PHOSPHORUS: CPT

## 2023-10-05 PROCEDURE — 87206 SMEAR FLUORESCENT/ACID STAI: CPT

## 2023-10-05 RX ORDER — INSULIN LISPRO 100/ML
6 VIAL (ML) SUBCUTANEOUS
Refills: 0 | Status: DISCONTINUED | OUTPATIENT
Start: 2023-10-05 | End: 2023-10-05

## 2023-10-05 RX ORDER — INSULIN LISPRO 100/ML
6 VIAL (ML) SUBCUTANEOUS
Qty: 1 | Refills: 0
Start: 2023-10-05 | End: 2023-11-03

## 2023-10-05 RX ORDER — LOSARTAN POTASSIUM 100 MG/1
1 TABLET, FILM COATED ORAL
Qty: 30 | Refills: 0
Start: 2023-10-05 | End: 2023-11-03

## 2023-10-05 RX ORDER — LOSARTAN POTASSIUM 100 MG/1
1 TABLET, FILM COATED ORAL
Qty: 0 | Refills: 0 | DISCHARGE
Start: 2023-10-05

## 2023-10-05 RX ADMIN — Medication 81 MILLIGRAM(S): at 12:20

## 2023-10-05 RX ADMIN — PANTOPRAZOLE SODIUM 40 MILLIGRAM(S): 20 TABLET, DELAYED RELEASE ORAL at 05:18

## 2023-10-05 RX ADMIN — Medication 8 UNIT(S): at 08:25

## 2023-10-05 RX ADMIN — PIPERACILLIN AND TAZOBACTAM 25 GRAM(S): 4; .5 INJECTION, POWDER, LYOPHILIZED, FOR SOLUTION INTRAVENOUS at 01:08

## 2023-10-05 RX ADMIN — PIPERACILLIN AND TAZOBACTAM 25 GRAM(S): 4; .5 INJECTION, POWDER, LYOPHILIZED, FOR SOLUTION INTRAVENOUS at 09:32

## 2023-10-05 RX ADMIN — Medication 6 UNIT(S): at 12:20

## 2023-10-05 RX ADMIN — LOSARTAN POTASSIUM 50 MILLIGRAM(S): 100 TABLET, FILM COATED ORAL at 05:19

## 2023-10-05 NOTE — PROGRESS NOTE ADULT - PROVIDER SPECIALTY LIST ADULT
Infectious Disease
Infectious Disease
Internal Medicine
Internal Medicine
Cardiology
Endocrinology
Infectious Disease
Infectious Disease
Internal Medicine
Cardiology
Endocrinology
Internal Medicine
Cardiology
Cardiology
Internal Medicine
Pulmonology
Internal Medicine
Pulmonology
Internal Medicine

## 2023-10-05 NOTE — PROGRESS NOTE ADULT - SUBJECTIVE AND OBJECTIVE BOX
Patient is a 52y old  Female who presents with a chief complaint of Hemoptysis (04 Oct 2023 13:27)    pt seen in icu [  ], reg med floor [  x ], bed [x  ], chair at bedside [   ], a+o x3 [x  ], lethargic [  ],    nad [ x ]        Allergies    NSAIDs (Short breath)  peppers (Rash)        Vitals    T(F): 97.7 (10-05-23 @ 05:05), Max: 98.1 (10-04-23 @ 21:30)  HR: 80 (10-05-23 @ 05:05) (80 - 87)  BP: 127/67 (10-05-23 @ 05:05) (111/58 - 128/63)  RR: 18 (10-05-23 @ 05:05) (18 - 18)  SpO2: 95% (10-05-23 @ 05:05) (95% - 97%)  Wt(kg): --  CAPILLARY BLOOD GLUCOSE      POCT Blood Glucose.: 118 mg/dL (04 Oct 2023 22:14)      Labs                          11.9   5.41  )-----------( 410      ( 04 Oct 2023 07:11 )             37.1       10-04    137  |  103  |  18  ----------------------------<  198<H>  3.9   |  27  |  0.76    Ca    9.3      04 Oct 2023 07:11  Phos  3.5     10-03  Mg     1.6     10-03              .Sputum Sputum  10-03 @ 15:50   Normal Respiratory Esha present  --    Rare polymorphonuclear leukocytes per low power field  Moderate Squamous epithelial cells per low power field  Moderate Gram Positive Cocci in Pairs and Chains per oil power field  Few Gram Negative Rods per oil power field      .Sputum Sputum  10-03 @ 14:50 --  --  --      .Sputum Sputum  10-02 @ 22:50   Culture is being performed.  --  --      .Sputum Sputum  10-02 @ 06:30   Culture is being performed.  --  --      Clean Catch Clean Catch (Midstream)  10-01 @ 03:05   <10,000 CFU/mL Normal Urogenital Esha  --  --      .Blood Blood-Peripheral  10-01 @ 00:40   No growth at 72 Hours  --  --      .Blood Blood-Peripheral  10-01 @ 00:30   No growth at 72 Hours  --  --          Radiology Results      Meds    MEDICATIONS  (STANDING):  aspirin enteric coated 81 milliGRAM(s) Oral daily  atorvastatin 80 milliGRAM(s) Oral at bedtime  influenza   Vaccine 0.5 milliLiter(s) IntraMuscular once  insulin glargine Injectable (LANTUS) 35 Unit(s) SubCutaneous at bedtime  insulin lispro (ADMELOG) corrective regimen sliding scale   SubCutaneous three times a day before meals  insulin lispro Injectable (ADMELOG) 8 Unit(s) SubCutaneous three times a day before meals  losartan 50 milliGRAM(s) Oral daily  pantoprazole    Tablet 40 milliGRAM(s) Oral before breakfast  piperacillin/tazobactam IVPB.. 3.375 Gram(s) IV Intermittent every 8 hours      MEDICATIONS  (PRN):      Physical Exam    Neuro :  no focal deficits  Respiratory: CTA B/L  CV: RRR, S1S2, no murmurs,   Abdominal: Soft, NT, ND +BS,  Extremities: No edema, + peripheral pulses      ASSESSMENT    pneumonia with hemoptysis,   pe r/o,    h/o DM,   HTN,    recent acute CVA on baby aspirin and Plavix  Hyperlipidemia      PLAN    hemoptysis resolved   Procalcitonin neg noted above  id f/u   blood cx neg noted above   MRSA PCR neg noted    Legionella urine AG neg noted   AFB sputum smear x 2 neg noted above   id f/u  Airborne Isolation   Continue Zosyn until work up is done  pulm f/u   incentive spirometer  atrov and prov nebs,   supplemental oxygen,   follow up CT chest in 6 weeks  hold plavix,    cardio f/u   cont asa, statin  lantus 35 qhs,   lispro 12 actid,   lispro ss,   cont current meds         Patient is a 52y old  Female who presents with a chief complaint of Hemoptysis (04 Oct 2023 13:27)    pt seen in icu [  ], reg med floor [  x ], bed [x  ], chair at bedside [   ], a+o x3 [x  ], lethargic [  ],    nad [ x ]        Allergies    NSAIDs (Short breath)  peppers (Rash)        Vitals    T(F): 97.7 (10-05-23 @ 05:05), Max: 98.1 (10-04-23 @ 21:30)  HR: 80 (10-05-23 @ 05:05) (80 - 87)  BP: 127/67 (10-05-23 @ 05:05) (111/58 - 128/63)  RR: 18 (10-05-23 @ 05:05) (18 - 18)  SpO2: 95% (10-05-23 @ 05:05) (95% - 97%)  Wt(kg): --  CAPILLARY BLOOD GLUCOSE      POCT Blood Glucose.: 118 mg/dL (04 Oct 2023 22:14)      Labs                          11.9   5.41  )-----------( 410      ( 04 Oct 2023 07:11 )             37.1       10-04    137  |  103  |  18  ----------------------------<  198<H>  3.9   |  27  |  0.76    Ca    9.3      04 Oct 2023 07:11  Phos  3.5     10-03  Mg     1.6     10-03        A1C with Estimated Average Glucose (09.20.23 @ 10:15)   A1C with Estimated Average Glucose Result: >15.5:       .Sputum Sputum  10-03 @ 15:50   Normal Respiratory Esha present  --    Rare polymorphonuclear leukocytes per low power field  Moderate Squamous epithelial cells per low power field  Moderate Gram Positive Cocci in Pairs and Chains per oil power field  Few Gram Negative Rods per oil power field      .Sputum Sputum  10-03 @ 14:50 --  --  --  Acid Fast Bacilli Smear:   No acid-fast bacilli seen by fluorochrome stain    .Sputum Sputum  10-02 @ 22:50   Culture is being performed.  --  --  Acid Fast Bacilli Smear:   No acid-fast bacilli seen by fluorochrome stain  .Sputum Sputum  10-02 @ 06:30   Culture is being performed.  --  --  Acid Fast Bacilli Smear:   No acid-fast bacilli seen by fluorochrome stain    Clean Catch Clean Catch (Midstream)  10-01 @ 03:05   <10,000 CFU/mL Normal Urogenital Esha  --  --      .Blood Blood-Peripheral  10-01 @ 00:40   No growth at 72 Hours  --  --      .Blood Blood-Peripheral  10-01 @ 00:30   No growth at 72 Hours  --  --          Radiology Results      Meds    MEDICATIONS  (STANDING):  aspirin enteric coated 81 milliGRAM(s) Oral daily  atorvastatin 80 milliGRAM(s) Oral at bedtime  influenza   Vaccine 0.5 milliLiter(s) IntraMuscular once  insulin glargine Injectable (LANTUS) 35 Unit(s) SubCutaneous at bedtime  insulin lispro (ADMELOG) corrective regimen sliding scale   SubCutaneous three times a day before meals  insulin lispro Injectable (ADMELOG) 8 Unit(s) SubCutaneous three times a day before meals  losartan 50 milliGRAM(s) Oral daily  pantoprazole    Tablet 40 milliGRAM(s) Oral before breakfast  piperacillin/tazobactam IVPB.. 3.375 Gram(s) IV Intermittent every 8 hours      MEDICATIONS  (PRN):      Physical Exam    Neuro :  no focal deficits  Respiratory: CTA B/L  CV: RRR, S1S2, no murmurs,   Abdominal: Soft, NT, ND +BS,  Extremities: No edema, + peripheral pulses      ASSESSMENT    pneumonia with hemoptysis,   pe r/o,    h/o DM,   HTN,    recent acute CVA on baby aspirin and Plavix  Hyperlipidemia      PLAN    hemoptysis resolved   Procalcitonin neg noted    blood cx neg noted above   MRSA PCR neg noted    Legionella urine AG neg noted   AFB sputum smear x 3 neg noted above   id f/u  Discontinue Airborne Isolation with Conjunction of Infection Control Dept. in the setting of  Three negative AFB smears  change Zosyn to oral Augmentin 875mg q 12hours to continue until 10/7/23  pulm f/u   incentive spirometer  atrov and prov nebs,   pt stable off O2  follow up CT chest in 6 weeks  hold plavix,    cardio f/u   cont asa, statin  hgba1c 14.5 (9/20/23) noted above   agree with lantus 35 units  dec admelog to 8 ac tid- hold if npo  fsg ac and hs  nutrition eval  cont current meds   pt stable for d/c

## 2023-10-05 NOTE — PROGRESS NOTE ADULT - SUBJECTIVE AND OBJECTIVE BOX
Interval Events:      Allergies    NSAIDs (Short breath)  peppers (Rash)    Intolerances      Endocrine/Metabolic Medications:  atorvastatin 80 milliGRAM(s) Oral at bedtime  insulin glargine Injectable (LANTUS) 35 Unit(s) SubCutaneous at bedtime  insulin lispro (ADMELOG) corrective regimen sliding scale   SubCutaneous three times a day before meals  insulin lispro Injectable (ADMELOG) 8 Unit(s) SubCutaneous three times a day before meals      Vital Signs Last 24 Hrs  T(C): 36.5 (05 Oct 2023 05:05), Max: 36.7 (04 Oct 2023 21:30)  T(F): 97.7 (05 Oct 2023 05:05), Max: 98.1 (04 Oct 2023 21:30)  HR: 80 (05 Oct 2023 05:05) (80 - 87)  BP: 127/67 (05 Oct 2023 05:05) (111/58 - 128/63)  BP(mean): 65 (04 Oct 2023 13:26) (65 - 65)  RR: 18 (05 Oct 2023 05:05) (18 - 18)  SpO2: 95% (05 Oct 2023 05:05) (95% - 97%)    Parameters below as of 05 Oct 2023 05:05  Patient On (Oxygen Delivery Method): room air          PHYSICAL EXAM  All physical exam findings normal, except those marked:  General:	Alert, active, cooperative, NAD, well hydrated  .		[] Abnormal:  Neck		Normal: supple, no cervical adenopathy, no palpable thyroid  .		[] Abnormal:  Cardiovascular	Normal: regular rate, normal S1, S2, no murmurs  .		[] Abnormal:  Respiratory	Normal: no chest wall deformity, normal respiratory pattern, CTA B/L  .		[] Abnormal:  Abdominal	Normal: soft, ND, NT, bowel sounds present, no masses, no organomegaly  .		[] Abnormal:  		Normal normal genitalia, testes descended, circumcised/uncircumcised  .		Garrett stage:			Breast garrett:  .		Menstrual history:  .		[] Abnormal:  Extremities	Normal: FROM x4  .		[] Abnormal:  Skin		Normal: intact and not indurated, no rash, no acanthosis nigricans  .		[] Abnormal:  Neurologic	Normal: grossly intact  .		[] Abnormal:    LABS                        11.5   4.96  )-----------( 366      ( 05 Oct 2023 07:22 )             35.3                               140    |  106    |  19                  Calcium: 9.0   / iCa: x      (10-05 @ 07:22)    ----------------------------<  151       Magnesium: x                                4.1     |  27     |  0.70             Phosphorous: x          CAPILLARY BLOOD GLUCOSE      POCT Blood Glucose.: 137 mg/dL (05 Oct 2023 07:50)  POCT Blood Glucose.: 118 mg/dL (04 Oct 2023 22:14)  POCT Blood Glucose.: 106 mg/dL (04 Oct 2023 17:01)  POCT Blood Glucose.: 137 mg/dL (04 Oct 2023 12:00)        Assesment/plan       Interval Events:  pt in nad    Allergies    NSAIDs (Short breath)  peppers (Rash)    Intolerances      Endocrine/Metabolic Medications:  atorvastatin 80 milliGRAM(s) Oral at bedtime  insulin glargine Injectable (LANTUS) 35 Unit(s) SubCutaneous at bedtime  insulin lispro (ADMELOG) corrective regimen sliding scale   SubCutaneous three times a day before meals  insulin lispro Injectable (ADMELOG) 8 Unit(s) SubCutaneous three times a day before meals      Vital Signs Last 24 Hrs  T(C): 36.5 (05 Oct 2023 05:05), Max: 36.7 (04 Oct 2023 21:30)  T(F): 97.7 (05 Oct 2023 05:05), Max: 98.1 (04 Oct 2023 21:30)  HR: 80 (05 Oct 2023 05:05) (80 - 87)  BP: 127/67 (05 Oct 2023 05:05) (111/58 - 128/63)  BP(mean): 65 (04 Oct 2023 13:26) (65 - 65)  RR: 18 (05 Oct 2023 05:05) (18 - 18)  SpO2: 95% (05 Oct 2023 05:05) (95% - 97%)    Parameters below as of 05 Oct 2023 05:05  Patient On (Oxygen Delivery Method): room air          PHYSICAL EXAM  All physical exam findings normal, except those marked:  General:	Alert, active, cooperative, NAD, well hydrated  .		[] Abnormal:  Neck		Normal: supple, no cervical adenopathy, no palpable thyroid  .		[] Abnormal:  Cardiovascular	Normal: regular rate, normal S1, S2, no murmurs  .		[] Abnormal:  Respiratory	Normal: no chest wall deformity, normal respiratory pattern, CTA B/L  .		[] Abnormal:  Abdominal	Normal: soft, ND, NT, bowel sounds present, no masses, no organomegaly  .		[] Abnormal:  		Normal normal genitalia, testes descended, circumcised/uncircumcised  .		Garrett stage:			Breast garrett:  .		Menstrual history:  .		[] Abnormal:  Extremities	Normal: FROM x4  .		[] Abnormal:  Skin		Normal: intact and not indurated, no rash, no acanthosis nigricans  .		[] Abnormal:  Neurologic	Normal: grossly intact  .		[] Abnormal:    LABS                        11.5   4.96  )-----------( 366      ( 05 Oct 2023 07:22 )             35.3                               140    |  106    |  19                  Calcium: 9.0   / iCa: x      (10-05 @ 07:22)    ----------------------------<  151       Magnesium: x                                4.1     |  27     |  0.70             Phosphorous: x          CAPILLARY BLOOD GLUCOSE      POCT Blood Glucose.: 137 mg/dL (05 Oct 2023 07:50)  POCT Blood Glucose.: 118 mg/dL (04 Oct 2023 22:14)  POCT Blood Glucose.: 106 mg/dL (04 Oct 2023 17:01)  POCT Blood Glucose.: 137 mg/dL (04 Oct 2023 12:00)        Assesment/plan  · Assessment	  This is a 51 y/o  female with pmhx  of DM, HTN,  recent acute CVA on baby aspirin and Plavix presenting with hemoptysis since Monday.   Found to have uncont dm. Pt is on basal/bolus insulin as out pt.Admits to taking lantus 35 qhs and admelog 12-18 premeals with ? fsg low to mid 100ss. Denies hypoglycemia.         Problem/Recommendation - 1:  ·  Problem: DM (diabetes mellitus).   ·  Recommendation: uncont with hyperglycemia  agree with lantus 35 units  dec admelog to 6 ac tid- hold if npo  fsg ac and hs  nutrition eval  d/w prim team.

## 2023-10-05 NOTE — PROGRESS NOTE ADULT - ASSESSMENT
51 y/o  female with pmhx  of DM, HTN,  recent acute CVA on baby aspirin and Plavix presenting with hemoptysis since Monday.  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  Reports having six episodes since being discharged home, mucus with streaks of blood,pneumonia.  1.Pneumonia-abx.  2.HTN-cozaar.  3.CVA-asa, statin, plavix on hold.  4.DM-Insulin.  5.Lipid d/o-statin.  6.Gi and DVT prophylaxis.

## 2023-10-05 NOTE — PROGRESS NOTE ADULT - SUBJECTIVE AND OBJECTIVE BOX
Date of Service 10-05-23 @ 10:16    CHIEF COMPLAINT:Patient is a 52y old  Female who presents with a chief complaint of Hemoptysis.Pt appears comfortable.    	  REVIEW OF SYSTEMS:  CONSTITUTIONAL: No fever, weight loss, or fatigue  EYES: No eye pain, visual disturbances, or discharge  ENT:  No difficulty hearing, tinnitus, vertigo; No sinus or throat pain  NECK: No pain or stiffness  RESPIRATORY: No cough, wheezing, chills or hemoptysis; No Shortness of Breath  CARDIOVASCULAR: No chest pain, palpitations, passing out, dizziness, or leg swelling  GASTROINTESTINAL: No abdominal or epigastric pain. No nausea, vomiting, or hematemesis; No diarrhea or constipation. No melena or hematochezia.  GENITOURINARY: No dysuria, frequency, hematuria, or incontinence  NEUROLOGICAL: No headaches, memory loss, loss of strength, numbness, or tremors  SKIN: No itching, burning, rashes, or lesions   LYMPH Nodes: No enlarged glands  ENDOCRINE: No heat or cold intolerance; No hair loss  MUSCULOSKELETAL: No joint pain or swelling; No muscle, back, or extremity pain  PSYCHIATRIC: No depression, anxiety, mood swings, or difficulty sleeping  HEME/LYMPH: No easy bruising, or bleeding gums  ALLERGY AND IMMUNOLOGIC: No hives or eczema	      PHYSICAL EXAM:  T(C): 36.5 (10-05-23 @ 05:05), Max: 36.7 (10-04-23 @ 21:30)  HR: 80 (10-05-23 @ 05:05) (80 - 87)  BP: 127/67 (10-05-23 @ 05:05) (111/58 - 128/63)  RR: 18 (10-05-23 @ 05:05) (18 - 18)  SpO2: 95% (10-05-23 @ 05:05) (95% - 97%)      Appearance: Normal	  HEENT:   Normal oral mucosa, PERRL, EOMI	  Lymphatic: No lymphadenopathy  Cardiovascular: Normal S1 S2, No JVD, No murmurs, No edema  Respiratory: Lungs clear to auscultation	  Psychiatry: A & O x 3, Mood & affect appropriate  Gastrointestinal:  Soft, Non-tender, + BS	  Skin: No rashes, No ecchymoses, No cyanosis	  Neurologic: Non-focal  Extremities: Normal range of motion, No clubbing, cyanosis or edema  Vascular: Peripheral pulses palpable 2+ bilaterally    MEDICATIONS  (STANDING):  aspirin enteric coated 81 milliGRAM(s) Oral daily  atorvastatin 80 milliGRAM(s) Oral at bedtime  influenza   Vaccine 0.5 milliLiter(s) IntraMuscular once  insulin glargine Injectable (LANTUS) 35 Unit(s) SubCutaneous at bedtime  insulin lispro (ADMELOG) corrective regimen sliding scale   SubCutaneous three times a day before meals  insulin lispro Injectable (ADMELOG) 6 Unit(s) SubCutaneous three times a day before meals  losartan 50 milliGRAM(s) Oral daily  pantoprazole    Tablet 40 milliGRAM(s) Oral before breakfast  piperacillin/tazobactam IVPB.. 3.375 Gram(s) IV Intermittent every 8 hours      	  	  LABS:	 	                        11.5   4.96  )-----------( 366      ( 05 Oct 2023 07:22 )             35.3     10-05    140  |  106  |  19<H>  ----------------------------<  151<H>  4.1   |  27  |  0.70    Ca    9.0      05 Oct 2023 07:22        Lipid Profile: Cholesterol 214  LDL --  HDL 36    Ldl calc 153  Ratio --    HgA1c:   TSH: Thyroid Stimulating Hormone, Serum: 0.59 uU/mL (09-21 @ 07:02)

## 2023-10-05 NOTE — PROGRESS NOTE ADULT - REASON FOR ADMISSION
Hemoptysis

## 2023-10-06 LAB
CULTURE RESULTS: SIGNIFICANT CHANGE UP
CULTURE RESULTS: SIGNIFICANT CHANGE UP
SPECIMEN SOURCE: SIGNIFICANT CHANGE UP
SPECIMEN SOURCE: SIGNIFICANT CHANGE UP

## 2023-11-18 LAB
CULTURE RESULTS: SIGNIFICANT CHANGE UP
SPECIMEN SOURCE: SIGNIFICANT CHANGE UP

## 2024-02-15 NOTE — ED ADULT NURSE NOTE - OBJECTIVE STATEMENT
no rashes , no jaundice present , good turgor , no masses , no tenderness on palpation
AOX4 +ambulatory patient reports hand injury x Thursday and was told there's no fx but hand is more swollen today. patient have an appt at [facility] on tuesday and denies any new trauma +pulses

## 2024-10-23 NOTE — PROGRESS NOTE ADULT - SUBJECTIVE AND OBJECTIVE BOX
Psychotherapy Group Note    PATIENT'S NAME: Marbella Hendrix  MRN:   [id number]  :   1966  ACCT. NUMBER: [id number]  DATE OF SERVICE: 10/23/24  START TIME:  2:00 PM  END TIME:  2:50 PM  FACILITATOR: Melvina Hebert LICSW  TOPIC: MH EBP Group: Relationship Skills  [facility]  TRACK: IOP 6     NUMBER OF PARTICIPANTS: 4    Summary of Group / Topics Discussed:  Relationship Skills: Dependencies: Patients were provided with a general overview of different types of dependencies and how they relate to symptoms and functioning. The purpose is to help patients identify the different types of dependencies, how they may be impacted by them, and to gain awareness and skills to work towards healthier relationships.    Patient Session Goals / Objectives:  Familiarized patients with the concept of interpersonal relationships and their characteristics.    Discussed and practiced strategies to promote healthier understanding of interpersonal relationships with a focus on dependencies  Identified types of dependencies in patient s lives and how they impact their symptoms and functioning      Patient Participation / Response:  Fully participated with the group by sharing personal reflections / insights and openly received / provided feedback with other participants.    Demonstrated understanding of topics discussed through group discussion and participation    Treatment Plan:  Patient has a current master individualized treatment plan.  See Epic treatment plan for more information.    ZUHAIR Sequeira    Date of Service 09-21-23 @ 09:16    CHIEF COMPLAINT:Patient is a 52y old  Female who presents with a chief complaint of CVA.Pt appears comfortable.    	  REVIEW OF SYSTEMS:  CONSTITUTIONAL: No fever, weight loss, or fatigue  EYES: No eye pain, visual disturbances, or discharge  ENT:  No difficulty hearing, tinnitus, vertigo; No sinus or throat pain  NECK: No pain or stiffness  RESPIRATORY: No cough, wheezing, chills or hemoptysis; No Shortness of Breath  CARDIOVASCULAR: No chest pain, palpitations, passing out, dizziness, or leg swelling  GASTROINTESTINAL: No abdominal or epigastric pain. No nausea, vomiting, or hematemesis; No diarrhea or constipation. No melena or hematochezia.  GENITOURINARY: No dysuria, frequency, hematuria, or incontinence  NEUROLOGICAL: No headaches, memory loss, loss of strength, numbness, or tremors  SKIN: No itching, burning, rashes, or lesions   LYMPH Nodes: No enlarged glands  ENDOCRINE: No heat or cold intolerance; No hair loss  MUSCULOSKELETAL: No joint pain or swelling; No muscle, back, or extremity pain  PSYCHIATRIC: No depression, anxiety, mood swings, or difficulty sleeping  HEME/LYMPH: No easy bruising, or bleeding gums  ALLERGY AND IMMUNOLOGIC: No hives or eczema	      PHYSICAL EXAM:  T(C): 36.8 (09-21-23 @ 08:10), Max: 37.7 (09-20-23 @ 16:05)  HR: 91 (09-21-23 @ 08:10) (88 - 101)  BP: 128/58 (09-21-23 @ 08:10) (109/59 - 150/67)  RR: 16 (09-21-23 @ 08:10) (16 - 20)  SpO2: 92% (09-21-23 @ 08:10) (92% - 96%)  Wt(kg): --  I&O's Summary      Appearance: Normal	  HEENT:   Normal oral mucosa, PERRL, EOMI	  Lymphatic: No lymphadenopathy  Cardiovascular: Normal S1 S2, No JVD, No murmurs, No edema  Respiratory: Lungs clear to auscultation	  Psychiatry: A & O x 3, Mood & affect appropriate  Gastrointestinal:  Soft, Non-tender, + BS	  Skin: No rashes, No ecchymoses, No cyanosis	  Neurologic: Non-focal  Extremities: Normal range of motion, No clubbing, cyanosis or edema  Vascular: Peripheral pulses palpable 2+ bilaterally    MEDICATIONS  (STANDING):  aspirin  chewable 81 milliGRAM(s) Oral daily  atorvastatin 80 milliGRAM(s) Oral at bedtime  enoxaparin Injectable 40 milliGRAM(s) SubCutaneous every 24 hours  hydrochlorothiazide 12.5 milliGRAM(s) Oral daily  insulin glargine Injectable (LANTUS) 15 Unit(s) SubCutaneous at bedtime  insulin lispro (ADMELOG) corrective regimen sliding scale   SubCutaneous Before meals and at bedtime  losartan 50 milliGRAM(s) Oral daily  pantoprazole    Tablet 40 milliGRAM(s) Oral before breakfast      TELEMETRY: nsr	      LABS:	 	      CARDIAC MARKERS ( 19 Sep 2023 19:40 )  x     / x     / 33 U/L / x     / x          Troponin I, High Sensitivity Result: 3.4 ng/L (09-19 @ 19:40)                            11.9   7.09  )-----------( 396      ( 20 Sep 2023 10:15 )             36.7     09-20    137  |  97  |  14  ----------------------------<  213<H>  3.9   |  31  |  0.65    Ca    9.5      20 Sep 2023 10:15  Phos  2.7     09-20  Mg     1.7     09-20    TPro  7.7  /  Alb  2.7<L>  /  TBili  0.2  /  DBili  x   /  AST  21  /  ALT  35  /  AlkPhos  106  09-19    proBNP:   Lipid Profile: Cholesterol 214  LDL --  HDL 36    Ldl calc 153  Ratio --    HgA1c:   TSH: Thyroid Stimulating Hormone, Serum: 0.59 uU/mL (09-21 @ 07:02)

## 2024-10-24 ENCOUNTER — EMERGENCY (EMERGENCY)
Facility: HOSPITAL | Age: 54
LOS: 1 days | Discharge: HOME | End: 2024-10-24
Attending: STUDENT IN AN ORGANIZED HEALTH CARE EDUCATION/TRAINING PROGRAM
Payer: COMMERCIAL

## 2024-10-24 VITALS
RESPIRATION RATE: 90 BRPM | SYSTOLIC BLOOD PRESSURE: 150 MMHG | TEMPERATURE: 98 F | WEIGHT: 183.2 LBS | HEART RATE: 86 BPM | OXYGEN SATURATION: 98 % | DIASTOLIC BLOOD PRESSURE: 73 MMHG

## 2024-10-24 DIAGNOSIS — K35.80 UNSPECIFIED ACUTE APPENDICITIS: Chronic | ICD-10-CM

## 2024-10-24 LAB
ALBUMIN SERPL ELPH-MCNC: 3.7 G/DL — SIGNIFICANT CHANGE UP (ref 3.5–5)
ALP SERPL-CCNC: 88 U/L — SIGNIFICANT CHANGE UP (ref 40–120)
ALT FLD-CCNC: 71 U/L DA — HIGH (ref 10–60)
ANION GAP SERPL CALC-SCNC: 5 MMOL/L — SIGNIFICANT CHANGE UP (ref 5–17)
ANISOCYTOSIS BLD QL: SLIGHT — SIGNIFICANT CHANGE UP
APTT BLD: 35 SEC — SIGNIFICANT CHANGE UP (ref 24.5–35.6)
AST SERPL-CCNC: 37 U/L — SIGNIFICANT CHANGE UP (ref 10–40)
BASOPHILS # BLD AUTO: 0.05 K/UL — SIGNIFICANT CHANGE UP (ref 0–0.2)
BASOPHILS NFR BLD AUTO: 0.8 % — SIGNIFICANT CHANGE UP (ref 0–2)
BILIRUB SERPL-MCNC: 0.5 MG/DL — SIGNIFICANT CHANGE UP (ref 0.2–1.2)
BUN SERPL-MCNC: 30 MG/DL — HIGH (ref 7–18)
CALCIUM SERPL-MCNC: 9.3 MG/DL — SIGNIFICANT CHANGE UP (ref 8.4–10.5)
CHLORIDE SERPL-SCNC: 102 MMOL/L — SIGNIFICANT CHANGE UP (ref 96–108)
CO2 SERPL-SCNC: 31 MMOL/L — SIGNIFICANT CHANGE UP (ref 22–31)
CREAT SERPL-MCNC: 0.72 MG/DL — SIGNIFICANT CHANGE UP (ref 0.5–1.3)
EGFR: 99 ML/MIN/1.73M2 — SIGNIFICANT CHANGE UP
EOSINOPHIL # BLD AUTO: 0.18 K/UL — SIGNIFICANT CHANGE UP (ref 0–0.5)
EOSINOPHIL NFR BLD AUTO: 2.8 % — SIGNIFICANT CHANGE UP (ref 0–6)
GLUCOSE SERPL-MCNC: 73 MG/DL — SIGNIFICANT CHANGE UP (ref 70–99)
HCT VFR BLD CALC: 39.5 % — SIGNIFICANT CHANGE UP (ref 34.5–45)
HGB BLD-MCNC: 13.1 G/DL — SIGNIFICANT CHANGE UP (ref 11.5–15.5)
IMM GRANULOCYTES NFR BLD AUTO: 0.2 % — SIGNIFICANT CHANGE UP (ref 0–0.9)
INR BLD: 0.9 RATIO — SIGNIFICANT CHANGE UP (ref 0.85–1.16)
LYMPHOCYTES # BLD AUTO: 3.11 K/UL — SIGNIFICANT CHANGE UP (ref 1–3.3)
LYMPHOCYTES # BLD AUTO: 47.6 % — HIGH (ref 13–44)
MANUAL SMEAR VERIFICATION: SIGNIFICANT CHANGE UP
MCHC RBC-ENTMCNC: 28.1 PG — SIGNIFICANT CHANGE UP (ref 27–34)
MCHC RBC-ENTMCNC: 33.2 GM/DL — SIGNIFICANT CHANGE UP (ref 32–36)
MCV RBC AUTO: 84.8 FL — SIGNIFICANT CHANGE UP (ref 80–100)
MICROCYTES BLD QL: SLIGHT — SIGNIFICANT CHANGE UP
MONOCYTES # BLD AUTO: 0.46 K/UL — SIGNIFICANT CHANGE UP (ref 0–0.9)
MONOCYTES NFR BLD AUTO: 7 % — SIGNIFICANT CHANGE UP (ref 2–14)
NEUTROPHILS # BLD AUTO: 2.72 K/UL — SIGNIFICANT CHANGE UP (ref 1.8–7.4)
NEUTROPHILS NFR BLD AUTO: 41.6 % — LOW (ref 43–77)
NRBC # BLD: 0 /100 WBCS — SIGNIFICANT CHANGE UP (ref 0–0)
PLAT MORPH BLD: NORMAL — SIGNIFICANT CHANGE UP
PLATELET # BLD AUTO: 234 K/UL — SIGNIFICANT CHANGE UP (ref 150–400)
POIKILOCYTOSIS BLD QL AUTO: SLIGHT — SIGNIFICANT CHANGE UP
POTASSIUM SERPL-MCNC: 3.8 MMOL/L — SIGNIFICANT CHANGE UP (ref 3.5–5.3)
POTASSIUM SERPL-SCNC: 3.8 MMOL/L — SIGNIFICANT CHANGE UP (ref 3.5–5.3)
PROT SERPL-MCNC: 7.4 G/DL — SIGNIFICANT CHANGE UP (ref 6–8.3)
PROTHROM AB SERPL-ACNC: 10.5 SEC — SIGNIFICANT CHANGE UP (ref 9.9–13.4)
RBC # BLD: 4.66 M/UL — SIGNIFICANT CHANGE UP (ref 3.8–5.2)
RBC # FLD: 12.7 % — SIGNIFICANT CHANGE UP (ref 10.3–14.5)
RBC BLD AUTO: ABNORMAL
SODIUM SERPL-SCNC: 138 MMOL/L — SIGNIFICANT CHANGE UP (ref 135–145)
WBC # BLD: 6.53 K/UL — SIGNIFICANT CHANGE UP (ref 3.8–10.5)
WBC # FLD AUTO: 6.53 K/UL — SIGNIFICANT CHANGE UP (ref 3.8–10.5)

## 2024-10-24 PROCEDURE — 70496 CT ANGIOGRAPHY HEAD: CPT | Mod: MC

## 2024-10-24 PROCEDURE — 96374 THER/PROPH/DIAG INJ IV PUSH: CPT | Mod: XU

## 2024-10-24 PROCEDURE — 80053 COMPREHEN METABOLIC PANEL: CPT

## 2024-10-24 PROCEDURE — 99285 EMERGENCY DEPT VISIT HI MDM: CPT

## 2024-10-24 PROCEDURE — 85730 THROMBOPLASTIN TIME PARTIAL: CPT

## 2024-10-24 PROCEDURE — 36415 COLL VENOUS BLD VENIPUNCTURE: CPT

## 2024-10-24 PROCEDURE — 99284 EMERGENCY DEPT VISIT MOD MDM: CPT | Mod: 25

## 2024-10-24 PROCEDURE — 70496 CT ANGIOGRAPHY HEAD: CPT | Mod: 26,MC

## 2024-10-24 PROCEDURE — 70498 CT ANGIOGRAPHY NECK: CPT | Mod: MC

## 2024-10-24 PROCEDURE — 85610 PROTHROMBIN TIME: CPT

## 2024-10-24 PROCEDURE — 85025 COMPLETE CBC W/AUTO DIFF WBC: CPT

## 2024-10-24 PROCEDURE — 70498 CT ANGIOGRAPHY NECK: CPT | Mod: 26,MC

## 2024-10-24 RX ORDER — ACETAMINOPHEN 325 MG
975 TABLET ORAL ONCE
Refills: 0 | Status: COMPLETED | OUTPATIENT
Start: 2024-10-24 | End: 2024-10-24

## 2024-10-24 RX ORDER — METOCLOPRAMIDE HCL 5 MG
10 TABLET ORAL ONCE
Refills: 0 | Status: COMPLETED | OUTPATIENT
Start: 2024-10-24 | End: 2024-10-24

## 2024-10-24 RX ADMIN — Medication 975 MILLIGRAM(S): at 20:40

## 2024-10-24 RX ADMIN — Medication 975 MILLIGRAM(S): at 20:08

## 2024-10-24 RX ADMIN — Medication 10 MILLIGRAM(S): at 20:08

## 2024-10-24 NOTE — ED ADULT NURSE NOTE - NSFALLUNIVINTERV_ED_ALL_ED
Bed/Stretcher in lowest position, wheels locked, appropriate side rails in place/Call bell, personal items and telephone in reach/Instruct patient to call for assistance before getting out of bed/chair/stretcher/Non-slip footwear applied when patient is off stretcher/[place] to call system/Physically safe environment - no spills, clutter or unnecessary equipment/Purposeful proactive rounding/Room/bathroom lighting operational, light cord in reach

## 2024-10-24 NOTE — ED PROVIDER NOTE - PROGRESS NOTE DETAILS
Erica (Silverio Cleary, DO pt feeling improved. aware of aneurysms on CTA. providing neurosurg follow up. trapezius hypertonic bilaterally L>R and pt with stress at home. no SI/HI. will RX muscle relaxants and dc with pcp follow up. all questions answered.

## 2024-10-24 NOTE — ED PROVIDER NOTE - PHYSICAL EXAMINATION
General: non-toxic, NAD  HEENT: NCAT, PERRL, no conjunctival injection or proptosis uncorrected visual acuity OD 20/40 OS 20/50   Cardiac: RRR, no murmurs, 2+ peripheral pulses  Resp: CTAB  Abdomen: soft, non-distended, bowel sounds present, no ttp, no rebound or guarding. no organomegaly  Extremities: no peripheral edema, calf tenderness, or leg size discrepancies  Skin: no rashes  Neuro: AAOx4, 5+motor, sensation grossly intact CN 2-12 intact, no nuchal rigidity   Psych: mood and affect appropriate

## 2024-10-24 NOTE — ED PROVIDER NOTE - CLINICAL SUMMARY MEDICAL DECISION MAKING FREE TEXT BOX
54-year-old female history of insulin-dependent diabetes, hypertension, hyperlipidemia, CVA with residual left-sided weakness presents with left-sided headache for 1 week with associated worsening vision in the right eye.  No floaters or flashers or curtain type vision change.  No fever chills travel.  No chest pain no new numbness tingling or weakness.  No imbalance no trauma no falls.  No bruits on auscultation of the neck vessels carotid and vertebral arteries. clear cardiopulmonary exam, benign abdomen, neuroexam at her baseline. no nuchal rigidity or temporal ttp. Uncorrected visual acuity 20/40 on the right 20/50 on the left. no stigmata of glaucoma or iritis. no infectious symptoms. eval vascular dissection in the neck, treat for migraine, likely dc.

## 2024-10-24 NOTE — ED PROVIDER NOTE - PATIENT PORTAL LINK FT
You can access the FollowMyHealth Patient Portal offered by [facility] by registering at the following website: [email or web address]. By joining [employer]’s FollowMyHealth portal, you will also be able to view your health information using other applications (apps) compatible with our system.

## 2024-10-24 NOTE — ED PROVIDER NOTE - NS ED ROS FT
Constitutional: no fevers, chills  HEENT: +HA, +vision changes, no rhinorrhea, sore throat  Cardiac: no chest pain, palpitations  Respiratory: no SOB, cough or hemoptysis  GI: no n/v/d/c, abd pain, bloody or dark stools  : no dysuria, frequency, or hematuria  MSK: no joint pain, +neck pain no back pain  Skin: no rashes, jaundice, pruritis  Neuro: no numbness/tingling, weakness, unsteady gait  ROS otherwise neg except per MDM

## 2024-10-24 NOTE — ED PROVIDER NOTE - NSFOLLOWUPINSTRUCTIONS_ED_ALL_ED_FT
You were seen in the Emergency Department for headache.     1) Advance activity as tolerated.   2) Continue all previously prescribed medications as directed.    3) Follow up with your primary care physician in 24-48 hours - take copies of your results.    4) Return to the Emergency Department for worsening or persistent symptoms, and/or ANY NEW OR CONCERNING SYMPTOMS.    you can take tylenol/acetaminophen 975mg every 6 hours for pain (do not exceed 1000mg per dose or 4000mg per day). be sure that any other medications you are taking do not contain tylenol/acetaminophen. if other medications do include tylenol/acetaminophen, be sure to include this in your calculations of one time dose and daily dose.     you can also take motrin/ibuprofen 600mg for pain.     acetaminophen/tylenol can be taken at the same time as motrin/ibuprofen for maximum pain relief or 3 hours apart for continuous pain relief.    you can use lidocaine patches over the counter. these can be worn for 12 hours a day, once a day. do not place over infected or open skin.    A muscle relaxer was sent to your pharmacy. Do not take this medication with any opioid medications or other muscle relaxers, including alcohol. Do not drive until you know how this medication affects you.

## 2024-10-24 NOTE — ED ADULT NURSE REASSESSMENT NOTE - NS ED NURSE REASSESS COMMENT FT1
Hand off :  received in stable condition NAD. Patient is A&OX4 speaking in full comprehensible sentences unlabored breathing. Pt denies any discomfort at this moment. Patent 20G IV rt forearm access present, dressing is clean and dry. Continuum of care on going

## 2024-10-24 NOTE — ED PROVIDER NOTE - NSFOLLOWUPCLINICS_GEN_ALL_ED_FT
[facility]  Neurosurgery  Referral Assistance Program  [place]   Phone:   Fax:

## 2024-10-25 VITALS
OXYGEN SATURATION: 96 % | DIASTOLIC BLOOD PRESSURE: 79 MMHG | SYSTOLIC BLOOD PRESSURE: 148 MMHG | HEART RATE: 83 BPM | TEMPERATURE: 98 F | RESPIRATION RATE: 19 BRPM

## 2024-11-01 ENCOUNTER — APPOINTMENT (OUTPATIENT)
Dept: NEUROSURGERY | Facility: CLINIC | Age: 54
End: 2024-11-01
Payer: COMMERCIAL

## 2024-11-01 ENCOUNTER — APPOINTMENT (OUTPATIENT)
Age: 54
End: 2024-11-01

## 2024-11-01 VITALS
WEIGHT: 179 LBS | SYSTOLIC BLOOD PRESSURE: 120 MMHG | OXYGEN SATURATION: 93 % | RESPIRATION RATE: 18 BRPM | HEART RATE: 89 BPM | DIASTOLIC BLOOD PRESSURE: 62 MMHG | HEIGHT: 68 IN | BODY MASS INDEX: 27.13 KG/M2

## 2024-11-01 DIAGNOSIS — E11.9 TYPE 2 DIABETES MELLITUS W/OUT COMPLICATIONS: ICD-10-CM

## 2024-11-01 DIAGNOSIS — E78.5 HYPERLIPIDEMIA, UNSPECIFIED: ICD-10-CM

## 2024-11-01 DIAGNOSIS — I67.1 CEREBRAL ANEURYSM, NONRUPTURED: ICD-10-CM

## 2024-11-01 DIAGNOSIS — I10 ESSENTIAL (PRIMARY) HYPERTENSION: ICD-10-CM

## 2024-11-01 DIAGNOSIS — G47.30 SLEEP APNEA, UNSPECIFIED: ICD-10-CM

## 2024-11-01 DIAGNOSIS — Z01.818 ENCOUNTER FOR OTHER PREPROCEDURAL EXAMINATION: ICD-10-CM

## 2024-11-01 DIAGNOSIS — M19.90 UNSPECIFIED OSTEOARTHRITIS, UNSPECIFIED SITE: ICD-10-CM

## 2024-11-01 DIAGNOSIS — F32.A DEPRESSION, UNSPECIFIED: ICD-10-CM

## 2024-11-01 PROCEDURE — 99205 OFFICE O/P NEW HI 60 MIN: CPT

## 2024-11-01 PROCEDURE — 99203 OFFICE O/P NEW LOW 30 MIN: CPT

## 2024-11-01 RX ORDER — INSULIN GLARGINE 100 [IU]/ML
INJECTION, SOLUTION SUBCUTANEOUS
Refills: 0 | Status: ACTIVE | COMMUNITY

## 2024-11-01 RX ORDER — INSULIN LISPRO 100 U/ML
INJECTION, SOLUTION INTRAVENOUS; SUBCUTANEOUS
Refills: 0 | Status: ACTIVE | COMMUNITY

## 2024-11-01 RX ORDER — EZETIMIBE 10 MG/1
10 TABLET ORAL
Refills: 0 | Status: ACTIVE | COMMUNITY

## 2024-11-01 RX ORDER — CETIRIZINE HYDROCHLORIDE 10 MG/1
10 TABLET, COATED ORAL
Refills: 0 | Status: ACTIVE | COMMUNITY

## 2024-11-01 RX ORDER — METFORMIN HYDROCHLORIDE 500 MG/1
240 TABLET, EXTENDED RELEASE ORAL
Refills: 0 | Status: ACTIVE | COMMUNITY

## 2024-11-01 RX ORDER — MONTELUKAST 10 MG/1
10 TABLET, FILM COATED ORAL
Refills: 0 | Status: ACTIVE | COMMUNITY

## 2024-11-01 RX ORDER — OMEPRAZOLE 40 MG/1
40 CAPSULE, DELAYED RELEASE ORAL
Refills: 0 | Status: ACTIVE | COMMUNITY

## 2024-11-01 RX ORDER — ATORVASTATIN CALCIUM 80 MG/1
80 TABLET, FILM COATED ORAL
Refills: 0 | Status: ACTIVE | COMMUNITY

## 2024-11-01 RX ORDER — SERTRALINE HYDROCHLORIDE 150 MG/1
CAPSULE ORAL
Refills: 0 | Status: ACTIVE | COMMUNITY

## 2024-11-01 RX ORDER — LOSARTAN POTASSIUM AND HYDROCHLOROTHIAZIDE 12.5; 5 MG/1; MG/1
50-12.5 TABLET ORAL
Refills: 0 | Status: ACTIVE | COMMUNITY

## 2024-11-01 RX ORDER — ASPIRIN 81 MG/1
81 TABLET ORAL
Refills: 0 | Status: ACTIVE | COMMUNITY

## 2024-11-01 RX ORDER — EMPAGLIFLOZIN 10 MG/1
10 TABLET, FILM COATED ORAL
Refills: 0 | Status: ACTIVE | COMMUNITY

## 2024-11-05 PROBLEM — Z01.818 PREOPERATIVE CLEARANCE: Status: ACTIVE | Noted: 2024-11-05

## 2025-01-17 NOTE — PATIENT PROFILE ADULT - NSTRANSFEREYEGLASSESPAIRS_GEN_A_NUR
Returned patient's call who reports feeling increasingly fatigued for that past week. Instructed patient to make an appointment with me for next week as it would inappropriate to treat over a phone call.    Lorene Beauchamp DO  Internal Medicine PGY-2  [facility]    1 pair

## 2025-01-31 NOTE — DISCHARGE NOTE NURSING/CASE MANAGEMENT/SOCIAL WORK - PATIENT PORTAL LINK FT
You can access the FollowMyHealth Patient Portal offered by [facility] by registering at the following website: [email or web address]. By joining [employer]’s FollowMyHealth portal, you will also be able to view your health information using other applications (apps) compatible with our system.
None known

## 2025-05-27 NOTE — ED ADULT NURSE NOTE - IS THE PATIENT ABLE TO BE SCREENED?
Specialty Medication Service    Date: 5/27/2025  Patient's Name: Leonor BOWMAN YOB: 1977            _____________________________________________________________________________________________    Left 3 messages, Sent Mychart message, and Sent text message to schedule PharmD initial appointment for Specialty Medication Services. Please call: [phone] option 4. Will continue to outreach as appropriate. 3/9 attempts made, moving to next month     Ramandeep Raya CPhT  Pharmacy   Specialty Medication Services   Phone: [phone] option 4      For Pharmacy Admin Tracking Only    Program: SMS  CPA in place:  No  Recommendation Provided To: Patient/Caregiver: 1 via Telephone and [employer] Message  Intervention Detail: Scheduled Appointment  Intervention Accepted By: Patient/Caregiver: 0  Gap Closed?:    Time Spent (min): 15    
Yes